# Patient Record
Sex: FEMALE | Race: OTHER | HISPANIC OR LATINO | URBAN - METROPOLITAN AREA
[De-identification: names, ages, dates, MRNs, and addresses within clinical notes are randomized per-mention and may not be internally consistent; named-entity substitution may affect disease eponyms.]

---

## 2017-06-12 ENCOUNTER — OUTPATIENT (OUTPATIENT)
Dept: OUTPATIENT SERVICES | Facility: HOSPITAL | Age: 52
LOS: 1 days | Discharge: HOME | End: 2017-06-12

## 2017-06-12 DIAGNOSIS — F19.20 OTHER PSYCHOACTIVE SUBSTANCE DEPENDENCE, UNCOMPLICATED: ICD-10-CM

## 2017-06-12 DIAGNOSIS — F33.2 MAJOR DEPRESSIVE DISORDER, RECURRENT SEVERE WITHOUT PSYCHOTIC FEATURES: ICD-10-CM

## 2017-06-28 DIAGNOSIS — F11.20 OPIOID DEPENDENCE, UNCOMPLICATED: ICD-10-CM

## 2017-07-12 ENCOUNTER — EMERGENCY (EMERGENCY)
Facility: HOSPITAL | Age: 52
LOS: 0 days | Discharge: HOME | End: 2017-07-12

## 2017-07-12 DIAGNOSIS — F32.9 MAJOR DEPRESSIVE DISORDER, SINGLE EPISODE, UNSPECIFIED: ICD-10-CM

## 2017-07-12 DIAGNOSIS — S83.91XA SPRAIN OF UNSPECIFIED SITE OF RIGHT KNEE, INITIAL ENCOUNTER: ICD-10-CM
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
## 2017-07-12 DIAGNOSIS — Z79.899 OTHER LONG TERM (CURRENT) DRUG THERAPY: ICD-10-CM

## 2017-07-12 DIAGNOSIS — S89.91XA UNSPECIFIED INJURY OF RIGHT LOWER LEG, INITIAL ENCOUNTER: ICD-10-CM

## 2017-07-12 DIAGNOSIS — Z87.891 PERSONAL HISTORY OF NICOTINE DEPENDENCE: ICD-10-CM

## 2017-07-12 DIAGNOSIS — I10 ESSENTIAL (PRIMARY) HYPERTENSION: ICD-10-CM

## 2017-07-12 DIAGNOSIS — F19.20 OTHER PSYCHOACTIVE SUBSTANCE DEPENDENCE, UNCOMPLICATED: ICD-10-CM

## 2017-07-12 DIAGNOSIS — F33.2 MAJOR DEPRESSIVE DISORDER, RECURRENT SEVERE WITHOUT PSYCHOTIC FEATURES: ICD-10-CM

## 2017-11-06 ENCOUNTER — EMERGENCY (EMERGENCY)
Facility: HOSPITAL | Age: 52
LOS: 0 days | Discharge: HOME | End: 2017-11-06

## 2017-11-06 DIAGNOSIS — Z79.899 OTHER LONG TERM (CURRENT) DRUG THERAPY: ICD-10-CM

## 2017-11-06 DIAGNOSIS — I10 ESSENTIAL (PRIMARY) HYPERTENSION: ICD-10-CM

## 2017-11-06 DIAGNOSIS — F19.90 OTHER PSYCHOACTIVE SUBSTANCE USE, UNSPECIFIED, UNCOMPLICATED: ICD-10-CM

## 2017-11-06 DIAGNOSIS — F19.20 OTHER PSYCHOACTIVE SUBSTANCE DEPENDENCE, UNCOMPLICATED: ICD-10-CM

## 2017-11-06 DIAGNOSIS — F33.2 MAJOR DEPRESSIVE DISORDER, RECURRENT SEVERE WITHOUT PSYCHOTIC FEATURES: ICD-10-CM

## 2017-11-06 DIAGNOSIS — F41.9 ANXIETY DISORDER, UNSPECIFIED: ICD-10-CM

## 2017-11-06 DIAGNOSIS — F41.0 PANIC DISORDER [EPISODIC PAROXYSMAL ANXIETY]: ICD-10-CM

## 2017-11-25 ENCOUNTER — INPATIENT (INPATIENT)
Facility: HOSPITAL | Age: 52
LOS: 10 days | Discharge: HOME | End: 2017-12-06
Attending: PSYCHIATRY & NEUROLOGY

## 2017-11-25 DIAGNOSIS — F19.20 OTHER PSYCHOACTIVE SUBSTANCE DEPENDENCE, UNCOMPLICATED: ICD-10-CM

## 2017-11-25 DIAGNOSIS — F33.2 MAJOR DEPRESSIVE DISORDER, RECURRENT SEVERE WITHOUT PSYCHOTIC FEATURES: ICD-10-CM

## 2017-12-11 DIAGNOSIS — Z59.0 HOMELESSNESS: ICD-10-CM

## 2017-12-11 DIAGNOSIS — R45.851 SUICIDAL IDEATIONS: ICD-10-CM

## 2017-12-11 DIAGNOSIS — F33.1 MAJOR DEPRESSIVE DISORDER, RECURRENT, MODERATE: ICD-10-CM

## 2017-12-11 DIAGNOSIS — F11.20 OPIOID DEPENDENCE, UNCOMPLICATED: ICD-10-CM

## 2017-12-11 DIAGNOSIS — I10 ESSENTIAL (PRIMARY) HYPERTENSION: ICD-10-CM

## 2017-12-11 DIAGNOSIS — F41.9 ANXIETY DISORDER, UNSPECIFIED: ICD-10-CM

## 2017-12-11 DIAGNOSIS — F14.10 COCAINE ABUSE, UNCOMPLICATED: ICD-10-CM

## 2017-12-11 SDOH — ECONOMIC STABILITY - HOUSING INSECURITY: HOMELESSNESS: Z59.0

## 2018-01-24 ENCOUNTER — EMERGENCY (EMERGENCY)
Facility: HOSPITAL | Age: 53
LOS: 0 days | Discharge: HOME | End: 2018-01-24

## 2018-01-24 DIAGNOSIS — F33.2 MAJOR DEPRESSIVE DISORDER, RECURRENT SEVERE WITHOUT PSYCHOTIC FEATURES: ICD-10-CM

## 2018-01-24 DIAGNOSIS — I10 ESSENTIAL (PRIMARY) HYPERTENSION: ICD-10-CM

## 2018-01-24 DIAGNOSIS — F19.20 OTHER PSYCHOACTIVE SUBSTANCE DEPENDENCE, UNCOMPLICATED: ICD-10-CM

## 2018-01-24 DIAGNOSIS — Z76.0 ENCOUNTER FOR ISSUE OF REPEAT PRESCRIPTION: ICD-10-CM

## 2018-01-24 DIAGNOSIS — Z79.891 LONG TERM (CURRENT) USE OF OPIATE ANALGESIC: ICD-10-CM

## 2018-01-24 DIAGNOSIS — Z79.899 OTHER LONG TERM (CURRENT) DRUG THERAPY: ICD-10-CM

## 2018-02-01 PROBLEM — Z00.00 ENCOUNTER FOR PREVENTIVE HEALTH EXAMINATION: Status: ACTIVE | Noted: 2018-02-01

## 2018-03-05 ENCOUNTER — APPOINTMENT (OUTPATIENT)
Dept: INTERNAL MEDICINE | Facility: CLINIC | Age: 53
End: 2018-03-05

## 2018-04-04 ENCOUNTER — OUTPATIENT (OUTPATIENT)
Dept: OUTPATIENT SERVICES | Facility: HOSPITAL | Age: 53
LOS: 1 days | Discharge: HOME | End: 2018-04-04

## 2018-04-05 DIAGNOSIS — F11.20 OPIOID DEPENDENCE, UNCOMPLICATED: ICD-10-CM

## 2018-07-23 ENCOUNTER — OUTPATIENT (OUTPATIENT)
Dept: OUTPATIENT SERVICES | Facility: HOSPITAL | Age: 53
LOS: 1 days | Discharge: HOME | End: 2018-07-23

## 2018-07-23 DIAGNOSIS — F11.20 OPIOID DEPENDENCE, UNCOMPLICATED: ICD-10-CM

## 2018-08-20 ENCOUNTER — OUTPATIENT (OUTPATIENT)
Dept: OUTPATIENT SERVICES | Facility: HOSPITAL | Age: 53
LOS: 1 days | Discharge: HOME | End: 2018-08-20

## 2018-08-21 DIAGNOSIS — F11.20 OPIOID DEPENDENCE, UNCOMPLICATED: ICD-10-CM

## 2018-08-29 ENCOUNTER — OUTPATIENT (OUTPATIENT)
Dept: OUTPATIENT SERVICES | Facility: HOSPITAL | Age: 53
LOS: 1 days | Discharge: HOME | End: 2018-08-29

## 2018-08-29 DIAGNOSIS — F11.20 OPIOID DEPENDENCE, UNCOMPLICATED: ICD-10-CM

## 2018-09-26 ENCOUNTER — OUTPATIENT (OUTPATIENT)
Dept: OUTPATIENT SERVICES | Facility: HOSPITAL | Age: 53
LOS: 1 days | Discharge: HOME | End: 2018-09-26

## 2018-09-26 DIAGNOSIS — F11.20 OPIOID DEPENDENCE, UNCOMPLICATED: ICD-10-CM

## 2018-10-19 ENCOUNTER — EMERGENCY (EMERGENCY)
Facility: HOSPITAL | Age: 53
LOS: 0 days | Discharge: HOME | End: 2018-10-20
Attending: EMERGENCY MEDICINE

## 2018-10-19 VITALS
RESPIRATION RATE: 18 BRPM | HEART RATE: 94 BPM | DIASTOLIC BLOOD PRESSURE: 90 MMHG | WEIGHT: 190.04 LBS | OXYGEN SATURATION: 96 % | TEMPERATURE: 98 F | SYSTOLIC BLOOD PRESSURE: 184 MMHG

## 2018-10-19 DIAGNOSIS — R07.9 CHEST PAIN, UNSPECIFIED: ICD-10-CM

## 2018-10-19 DIAGNOSIS — Z90.49 ACQUIRED ABSENCE OF OTHER SPECIFIED PARTS OF DIGESTIVE TRACT: Chronic | ICD-10-CM

## 2018-10-19 DIAGNOSIS — Z98.891 HISTORY OF UTERINE SCAR FROM PREVIOUS SURGERY: Chronic | ICD-10-CM

## 2018-10-19 DIAGNOSIS — I10 ESSENTIAL (PRIMARY) HYPERTENSION: ICD-10-CM

## 2018-10-19 DIAGNOSIS — F17.210 NICOTINE DEPENDENCE, CIGARETTES, UNCOMPLICATED: ICD-10-CM

## 2018-10-19 LAB
ALBUMIN SERPL ELPH-MCNC: 4.4 G/DL — SIGNIFICANT CHANGE UP (ref 3.5–5.2)
ALP SERPL-CCNC: 87 U/L — SIGNIFICANT CHANGE UP (ref 30–115)
ALT FLD-CCNC: 338 U/L — HIGH (ref 0–41)
ANION GAP SERPL CALC-SCNC: 15 MMOL/L — HIGH (ref 7–14)
APTT BLD: 37.3 SEC — SIGNIFICANT CHANGE UP (ref 27–39.2)
AST SERPL-CCNC: 229 U/L — HIGH (ref 0–41)
BASE EXCESS BLDV CALC-SCNC: 6.2 MMOL/L — HIGH (ref -2–2)
BILIRUB SERPL-MCNC: 0.3 MG/DL — SIGNIFICANT CHANGE UP (ref 0.2–1.2)
BUN SERPL-MCNC: 17 MG/DL — SIGNIFICANT CHANGE UP (ref 10–20)
CA-I SERPL-SCNC: 1.22 MMOL/L — SIGNIFICANT CHANGE UP (ref 1.12–1.3)
CALCIUM SERPL-MCNC: 9.4 MG/DL — SIGNIFICANT CHANGE UP (ref 8.5–10.1)
CHLORIDE SERPL-SCNC: 97 MMOL/L — LOW (ref 98–110)
CO2 SERPL-SCNC: 25 MMOL/L — SIGNIFICANT CHANGE UP (ref 17–32)
CREAT SERPL-MCNC: 0.6 MG/DL — LOW (ref 0.7–1.5)
GAS PNL BLDV: 144 MMOL/L — SIGNIFICANT CHANGE UP (ref 136–145)
GAS PNL BLDV: SIGNIFICANT CHANGE UP
GAS PNL BLDV: SIGNIFICANT CHANGE UP
GLUCOSE SERPL-MCNC: 109 MG/DL — HIGH (ref 70–99)
HCO3 BLDV-SCNC: 34 MMOL/L — HIGH (ref 22–29)
HCT VFR BLD CALC: 45 % — SIGNIFICANT CHANGE UP (ref 37–47)
HCT VFR BLDA CALC: 48.7 % — HIGH (ref 34–44)
HGB BLD CALC-MCNC: 15.9 G/DL — SIGNIFICANT CHANGE UP (ref 14–18)
HGB BLD-MCNC: 15 G/DL — SIGNIFICANT CHANGE UP (ref 12–16)
INR BLD: 1.25 RATIO — SIGNIFICANT CHANGE UP (ref 0.65–1.3)
LACTATE BLDV-MCNC: 0.7 MMOL/L — SIGNIFICANT CHANGE UP (ref 0.5–1.6)
LIDOCAIN IGE QN: 38 U/L — SIGNIFICANT CHANGE UP (ref 7–60)
MAGNESIUM SERPL-MCNC: 1.8 MG/DL — SIGNIFICANT CHANGE UP (ref 1.8–2.4)
MCHC RBC-ENTMCNC: 30.5 PG — SIGNIFICANT CHANGE UP (ref 27–31)
MCHC RBC-ENTMCNC: 33.3 G/DL — SIGNIFICANT CHANGE UP (ref 32–37)
MCV RBC AUTO: 91.6 FL — SIGNIFICANT CHANGE UP (ref 81–99)
NRBC # BLD: 0 /100 WBCS — SIGNIFICANT CHANGE UP (ref 0–0)
NT-PROBNP SERPL-SCNC: 73 PG/ML — SIGNIFICANT CHANGE UP (ref 0–300)
PCO2 BLDV: 63 MMHG — HIGH (ref 41–51)
PH BLDV: 7.34 — SIGNIFICANT CHANGE UP (ref 7.26–7.43)
PLATELET # BLD AUTO: 135 K/UL — SIGNIFICANT CHANGE UP (ref 130–400)
PO2 BLDV: 26 MMHG — SIGNIFICANT CHANGE UP (ref 20–40)
POTASSIUM BLDV-SCNC: 4 MMOL/L — SIGNIFICANT CHANGE UP (ref 3.3–5.6)
POTASSIUM SERPL-MCNC: 7 MMOL/L — CRITICAL HIGH (ref 3.5–5)
POTASSIUM SERPL-SCNC: 7 MMOL/L — CRITICAL HIGH (ref 3.5–5)
PROT SERPL-MCNC: 9.4 G/DL — HIGH (ref 6–8)
PROTHROM AB SERPL-ACNC: 14.4 SEC — HIGH (ref 9.95–12.87)
RBC # BLD: 4.91 M/UL — SIGNIFICANT CHANGE UP (ref 4.2–5.4)
RBC # FLD: 14.3 % — SIGNIFICANT CHANGE UP (ref 11.5–14.5)
SAO2 % BLDV: 35 % — SIGNIFICANT CHANGE UP
SODIUM SERPL-SCNC: 137 MMOL/L — SIGNIFICANT CHANGE UP (ref 135–146)
TROPONIN T SERPL-MCNC: <0.01 NG/ML — SIGNIFICANT CHANGE UP
TROPONIN T SERPL-MCNC: <0.01 NG/ML — SIGNIFICANT CHANGE UP
WBC # BLD: 7.46 K/UL — SIGNIFICANT CHANGE UP (ref 4.8–10.8)
WBC # FLD AUTO: 7.46 K/UL — SIGNIFICANT CHANGE UP (ref 4.8–10.8)

## 2018-10-19 RX ORDER — METOCLOPRAMIDE HCL 10 MG
10 TABLET ORAL ONCE
Qty: 0 | Refills: 0 | Status: COMPLETED | OUTPATIENT
Start: 2018-10-19 | End: 2018-10-19

## 2018-10-19 RX ORDER — ACETAMINOPHEN 500 MG
975 TABLET ORAL ONCE
Qty: 0 | Refills: 0 | Status: COMPLETED | OUTPATIENT
Start: 2018-10-19 | End: 2018-10-19

## 2018-10-19 RX ORDER — HYDROCHLOROTHIAZIDE 25 MG
12.5 TABLET ORAL ONCE
Qty: 0 | Refills: 0 | Status: COMPLETED | OUTPATIENT
Start: 2018-10-19 | End: 2018-10-19

## 2018-10-19 RX ORDER — AMLODIPINE BESYLATE 2.5 MG/1
2.5 TABLET ORAL ONCE
Qty: 0 | Refills: 0 | Status: COMPLETED | OUTPATIENT
Start: 2018-10-19 | End: 2018-10-19

## 2018-10-19 RX ORDER — METOPROLOL TARTRATE 50 MG
100 TABLET ORAL ONCE
Qty: 0 | Refills: 0 | Status: COMPLETED | OUTPATIENT
Start: 2018-10-19 | End: 2018-10-19

## 2018-10-19 RX ADMIN — AMLODIPINE BESYLATE 2.5 MILLIGRAM(S): 2.5 TABLET ORAL at 17:22

## 2018-10-19 RX ADMIN — Medication 10 MILLIGRAM(S): at 17:17

## 2018-10-19 RX ADMIN — Medication 100 MILLIGRAM(S): at 17:22

## 2018-10-19 RX ADMIN — Medication 12.5 MILLIGRAM(S): at 17:22

## 2018-10-19 NOTE — ED CDU PROVIDER INITIAL DAY NOTE - NS ED ROS FT
Constitutional: No fever, chills.  Eyes: No visual changes.  ENT: No hearing changes.  Neck: No neck pain or stiffness.  Cardiovascular: see hpi  Pulmonary: No SOB, cough. No hemoptysis.  Abdominal:  No nausea, vomiting, diarrhea.  : No dysuria, frequency.  Neuro: No headache, syncope, dizziness.  MS: No back pain. No calf pain/swelling.  Psych: No suicidal ideations.

## 2018-10-19 NOTE — ED PROVIDER NOTE - PROGRESS NOTE DETAILS
pt feels improved with tylenol and reglan.  no longer has headache.  no CP at this time Discussed results with cecy Ramirez with observation Discussed case with ABUNDIO Mtz,  aware of observation

## 2018-10-19 NOTE — ED ADULT NURSE NOTE - NSIMPLEMENTINTERV_GEN_ALL_ED
Implemented All Universal Safety Interventions:  [place] to call system. Call bell, personal items and telephone within reach. Instruct patient to call for assistance. Room bathroom lighting operational. Non-slip footwear when patient is off stretcher. Physically safe environment: no spills, clutter or unnecessary equipment. Stretcher in lowest position, wheels locked, appropriate side rails in place.

## 2018-10-19 NOTE — ED PROVIDER NOTE - ATTENDING CONTRIBUTION TO CARE
53 y F PMH depression, anxiety, HTN on 3 medications, noncompliant due to running out and DrSachin is on vacation unable to give refills x 1.5 wks, pw headache x 1.5 weeks. Today occurred again, gradual onset, no vision changes, numbness, tingling, weakness. Chest pressure and diaphoresis occurring with walking. NO radiation of pain elsewhere. Patient has never had any cardiac workup in the past. Was seen at [facility] yesterday with negative CT head, blood tests showing some changes to the ?liver tests, but she is unsure.  Exam: NAD, NCAT, HEENT: mmm, EOMI, PERRLA, Neck: supple, nontender, nl ROM, Heart: RRR, no murmur, Lungs: BCTA, no signs of increased WOB, Abd: NTND, no guarding or rebound, no hernia palpated, no CVAT. MSK: chest, back, and ext nontender, nl rom, no deformity. Neuro: A&Ox3, CN II-XII intact, normal strength 5/5 all 4 ext, nl sensation throughout, normal speech and coordination.  A/P: Eval ACS, HTN emergency, intracranial bleeding, or other complications of elevated BP. most likely tension headache and medication noncompliance as cause for elevated BP, however chest pressure and diaphoresis is concerning.

## 2018-10-19 NOTE — ED CDU PROVIDER INITIAL DAY NOTE - ATTENDING CONTRIBUTION TO CARE
Pt presents with chest pain that woke her up yesterday. Pain radiated to the left chest. Hx of tob and HTN. Pain lasted for 3 hours. On exam S1S2 rrr, lungs clear, abdomen is soft non tenderness, ext mild edema bilaterally. EKG non ischemic. Chest x-ray normal. Hx of elevated lfts. Pt is scheduled for a CCTA. IV 18 HR is 90 Will give lopressor.

## 2018-10-19 NOTE — ED CDU PROVIDER INITIAL DAY NOTE - OBJECTIVE STATEMENT
53 yold female to Ed Pmhx Bipolar d/o, depression, anxiety, Htn present to Ed with multiple complaints; pt c/o headache, photophobia and elevated bp; pt has not taken bp meds in a few weeks; pt also c/o cp described as tightness; seen at [facility] overnight; pt denies stress test;

## 2018-10-19 NOTE — ED CDU PROVIDER INITIAL DAY NOTE - PHYSICAL EXAMINATION
Constitutional: Well developed, well nourished. NAD  Head: Normocephalic, atraumatic.  Eyes: PERRL, EOMI.  ENT: No nasal discharge. Mucous membranes dry.  Neck: Supple. Painless ROM.  Cardiovascular:  Regular rate and rhythm.   Pulmonary:  Lungs clear to auscultation bilaterally.   Abdominal: Soft. Nondistended. No rebound, guarding, rigidity.  Extremities. Pelvis stable. No lower extremity edema, symmetric calves.  Skin: No rashes, cyanosis.  Neuro: AAOx3. No focal neurological deficits.  Psych: Normal mood. Normal affect.

## 2018-10-19 NOTE — ED PROVIDER NOTE - PHYSICAL EXAMINATION
CONST: Well appearing in NAD  EYES: PERRL, EOMI, Sclera and conjunctiva clear.  ENT: No nasal discharge. TM's clear B/L without drainage. Oropharynx normal appearing, no erythema or exudates. Uvula midline.  NECK: Non-tender, no meningeal signs, supple  CARD: Normal S1 S2; Normal rate and rhythm  RESP: Equal BS B/L, No wheezes, rhonchi or rales. No distress  GI: Soft, non-tender, non-distended.  MS: Normal ROM in all extremities. no edema of lower extremities, no calf pain, No midline spinal tenderness. pedal pulses 2+ bilaterally  SKIN: Warm, dry, no acute rashes. Good turgor  NEURO: A&Ox3, CN II-XII intact, no focal deficits, no facial asymmetry, no pronator drift, normal finger to nose, no nystagmus, gross sensation intact, UE/LE strength 5/5, stable gait

## 2018-10-19 NOTE — ED ADULT NURSE NOTE - OBJECTIVE STATEMENT
pt presents with midsternal chest pressure with hypertension and headache that started 1 week ago and worsened today.

## 2018-10-19 NOTE — ED CDU PROVIDER INITIAL DAY NOTE - PROGRESS NOTE DETAILS
received signout from Isaías Triplett - pt sent to Obs for cardiac eval; head ct negative; 1st ce/ekg wnl; will continue to follow;

## 2018-10-19 NOTE — ED PROVIDER NOTE - OBJECTIVE STATEMENT
53 y.o female with hx of anxiety, dipolar, depression HTN presents to the ED for evaluation of chest pain and HA x 1 day.  Pt states that she woke up this morning and developed gradual onset of headache with associated photophobia.  Headache was not maximal in onset.  HA persisted throughout day which is similar to headaches shes has had in the past.  Around 1000 hrs developed chest tightness which prompted her to go to PMD.  Had BP in 200s systolic and was told to come to the ED.  Pt relates she has HTN and is supposed to be taking HCTZ, amlodipine, metoprolol but has not taken them for "some time" secondary to insurance issues. States that she went to [facility] with similar complaints yesterday, had negative workup, was observed overnight and discharged. Adds no other complaints at this time and denies head injury, nausea, vomiting, dyspnea, edema of lower extremities, calf pain.  No fhx of cardiac dz.  had negative stress test 2 years ago.

## 2018-10-19 NOTE — ED PROVIDER NOTE - MEDICAL DECISION MAKING DETAILS
Patient to be placed into observation status. There is a lack of diagnostic certainty, where a more precise diagnosis could decide inpatient admission or discharge to home, and/or need for therapeutic intensity, where extensive therapy has a reasonable possibility of abating the patient's presenting condition, and thereby prevents inpatient admission.

## 2018-10-19 NOTE — ED PROVIDER NOTE - NS ED ROS FT
Constitutional: See HPI.  Eyes: No visual changes, eye pain or discharge. No Photophobia  ENMT: No hearing changes, pain, discharge or infections. No neck pain or stiffness. No limited ROM  Cardiac:  + chest pain. No SOB or edema.   Respiratory: No cough or respiratory distress. No hemoptysis. No history of asthma or RAD.  GI: No nausea, vomiting, diarrhea or abdominal pain.  : No dysuria, frequency or burning. No Discharge  MS: No myalgia, muscle weakness, joint pain or back pain.  Neuro: + headache. No  weakness. No LOC.  Skin: No skin rash.  Except as documented in the HPI, all other systems are negative.

## 2018-10-20 RX ORDER — GABAPENTIN 400 MG/1
100 CAPSULE ORAL ONCE
Qty: 0 | Refills: 0 | Status: COMPLETED | OUTPATIENT
Start: 2018-10-20 | End: 2018-10-20

## 2018-10-20 RX ORDER — CITALOPRAM 10 MG/1
10 TABLET, FILM COATED ORAL DAILY
Qty: 0 | Refills: 0 | Status: DISCONTINUED | OUTPATIENT
Start: 2018-10-20 | End: 2018-10-20

## 2018-10-20 RX ORDER — ALPRAZOLAM 0.25 MG
0.5 TABLET ORAL ONCE
Qty: 0 | Refills: 0 | Status: DISCONTINUED | OUTPATIENT
Start: 2018-10-20 | End: 2018-10-20

## 2018-10-20 RX ORDER — AMLODIPINE BESYLATE 2.5 MG/1
5 TABLET ORAL ONCE
Qty: 0 | Refills: 0 | Status: COMPLETED | OUTPATIENT
Start: 2018-10-20 | End: 2018-10-20

## 2018-10-20 RX ORDER — METOPROLOL TARTRATE 50 MG
150 TABLET ORAL ONCE
Qty: 0 | Refills: 0 | Status: COMPLETED | OUTPATIENT
Start: 2018-10-20 | End: 2018-10-20

## 2018-10-20 RX ORDER — METOPROLOL TARTRATE 50 MG
50 TABLET ORAL ONCE
Qty: 0 | Refills: 0 | Status: COMPLETED | OUTPATIENT
Start: 2018-10-20 | End: 2018-10-20

## 2018-10-20 RX ORDER — PANTOPRAZOLE SODIUM 20 MG/1
40 TABLET, DELAYED RELEASE ORAL
Qty: 0 | Refills: 0 | Status: DISCONTINUED | OUTPATIENT
Start: 2018-10-20 | End: 2018-10-20

## 2018-10-20 RX ORDER — ADENOSINE 3 MG/ML
60 INJECTION INTRAVENOUS ONCE
Qty: 0 | Refills: 0 | Status: DISCONTINUED | OUTPATIENT
Start: 2018-10-20 | End: 2018-10-20

## 2018-10-20 RX ORDER — HYDROCHLOROTHIAZIDE 25 MG
12.5 TABLET ORAL ONCE
Qty: 0 | Refills: 0 | Status: COMPLETED | OUTPATIENT
Start: 2018-10-20 | End: 2018-10-20

## 2018-10-20 RX ADMIN — PANTOPRAZOLE SODIUM 40 MILLIGRAM(S): 20 TABLET, DELAYED RELEASE ORAL at 14:50

## 2018-10-20 RX ADMIN — Medication 12.5 MILLIGRAM(S): at 14:50

## 2018-10-20 RX ADMIN — Medication 150 MILLIGRAM(S): at 08:23

## 2018-10-20 RX ADMIN — CITALOPRAM 10 MILLIGRAM(S): 10 TABLET, FILM COATED ORAL at 14:50

## 2018-10-20 RX ADMIN — Medication 50 MILLIGRAM(S): at 11:44

## 2018-10-20 RX ADMIN — Medication 50 MILLIGRAM(S): at 09:39

## 2018-10-20 RX ADMIN — Medication 0.5 MILLIGRAM(S): at 12:23

## 2018-10-20 RX ADMIN — GABAPENTIN 100 MILLIGRAM(S): 400 CAPSULE ORAL at 14:51

## 2018-10-20 RX ADMIN — AMLODIPINE BESYLATE 5 MILLIGRAM(S): 2.5 TABLET ORAL at 14:50

## 2018-10-20 NOTE — ED CDU PROVIDER SUBSEQUENT DAY NOTE - PROGRESS NOTE DETAILS
Patient comfortable, awaiting CCTA Patients HR still above 70 after Lopressor. CCTA cancel and nuclear stress test ordered Ultrasound guided IV placed for Nuclear medicine. Nuclear medicine(alyssa) advised that patient is ready for stress testing. Pt ate lunch and Stress test advised she will not be able to do stress testing today. Will do in the am. Pt advised to stay NPO. received sign out from Isaías Reyes/Dr. Dacosta; pt with ce x 2 / ekg x 2 unchanged; Nuclear stress test to be in am; pt resting in obs without complaints; will have stress test this am; npo now; patient signed out from lian aguilar, no complaint sleeping

## 2018-10-21 VITALS
HEART RATE: 77 BPM | TEMPERATURE: 98 F | SYSTOLIC BLOOD PRESSURE: 155 MMHG | RESPIRATION RATE: 18 BRPM | DIASTOLIC BLOOD PRESSURE: 86 MMHG | OXYGEN SATURATION: 98 %

## 2018-10-21 RX ORDER — AMLODIPINE BESYLATE 2.5 MG/1
5 TABLET ORAL ONCE
Qty: 0 | Refills: 0 | Status: COMPLETED | OUTPATIENT
Start: 2018-10-21 | End: 2018-10-21

## 2018-10-21 RX ORDER — CITALOPRAM 10 MG/1
10 TABLET, FILM COATED ORAL DAILY
Qty: 0 | Refills: 0 | Status: DISCONTINUED | OUTPATIENT
Start: 2018-10-21 | End: 2018-10-20

## 2018-10-21 RX ORDER — METOPROLOL TARTRATE 50 MG
100 TABLET ORAL ONCE
Qty: 0 | Refills: 0 | Status: COMPLETED | OUTPATIENT
Start: 2018-10-21 | End: 2018-10-21

## 2018-10-21 RX ORDER — HYDROCHLOROTHIAZIDE 25 MG
12.5 TABLET ORAL ONCE
Qty: 0 | Refills: 0 | Status: COMPLETED | OUTPATIENT
Start: 2018-10-21 | End: 2018-10-21

## 2018-10-21 RX ORDER — GABAPENTIN 400 MG/1
100 CAPSULE ORAL ONCE
Qty: 0 | Refills: 0 | Status: COMPLETED | OUTPATIENT
Start: 2018-10-21 | End: 2018-10-21

## 2018-10-21 RX ADMIN — PANTOPRAZOLE SODIUM 40 MILLIGRAM(S): 20 TABLET, DELAYED RELEASE ORAL at 11:03

## 2018-10-21 RX ADMIN — GABAPENTIN 100 MILLIGRAM(S): 400 CAPSULE ORAL at 11:03

## 2018-10-21 RX ADMIN — Medication 100 MILLIGRAM(S): at 11:03

## 2018-10-21 RX ADMIN — CITALOPRAM 10 MILLIGRAM(S): 10 TABLET, FILM COATED ORAL at 11:03

## 2018-10-21 RX ADMIN — Medication 12.5 MILLIGRAM(S): at 11:03

## 2018-10-21 RX ADMIN — AMLODIPINE BESYLATE 5 MILLIGRAM(S): 2.5 TABLET ORAL at 11:03

## 2018-10-21 NOTE — ED CDU PROVIDER SUBSEQUENT DAY NOTE - MEDICAL DECISION MAKING DETAILS
chest pain>>CE neg>>Stress test. Pt is npo
Unable to Do CCTA due to elevated heart rate. Stress test ordered.

## 2018-10-21 NOTE — ED CDU PROVIDER DISPOSITION NOTE - CLINICAL COURSE
Pt placed into observation for chest pain. While in observation pt was on continuous cardiac monitoring. Serial cardiac markers neg. Stress test neg. All reports given to pt. Hx of hepatitis. Will refer to GI and cardiology. All reports given to pt.

## 2018-10-21 NOTE — ED ADULT NURSE REASSESSMENT NOTE - NS ED NURSE REASSESS COMMENT FT1
Patient admitted to ED3 from main ER. Patient A&Ox4, no respiratory distress, on cardiac monitor for dx CP, no GI distress, voids without difficulty. No skin breakdown. Will continue to monitor for comfort and safety.
Pt assessed A.O times 4 Vs stable on cardiac monitor  denies no chest pain no SOB no N/V no doziness ambulate steady ,pt is seen evaluate by ED attending  with order for CTA Metoprolol 50 mg po order is given ,NPO at this time on going nursing observation .
Pt assessed. AAO x 4. Pt resting comfortably in stretcher. No distress noted at this time. Vitals stable. Comfort measures in place. Safety measures maintained. Pt on continuous cardiac and pulse ox monitoring.
Pt reassessed A.O times 4 Vs stable denies no chest pain ,no SOB ,no N.V ambulate steady dinner tray provide did eat 100% ,pt all medication is given as per order ED attending aware NAD noted on going nursing  observation .
Patient A&Ox3  Patient remains on cardiac monitor and in stable condition and nad. Will continue to monitor.
patient A&Ox3  Patient sleeping in bed with no difficulty breathing noted.  Patient remains on cardiac monitor. Patient in stable condition and nad.  Will continue to monitor.
patient VS taken and reported high BP to provider.
patient sleeping in bed with no difficulty breathing noted. Patient continues to be monitored on cardiac monitor.  Patient in stable condition and nad.  Will continue to monitor.

## 2018-10-21 NOTE — ED CDU PROVIDER SUBSEQUENT DAY NOTE - ATTENDING CONTRIBUTION TO CARE
Pt is in observation for chest pain. She will be completing her stress test this am. Pt has no complaints.
Pt is in observation for chest pain.

## 2018-10-31 ENCOUNTER — OUTPATIENT (OUTPATIENT)
Dept: OUTPATIENT SERVICES | Facility: HOSPITAL | Age: 53
LOS: 1 days | Discharge: HOME | End: 2018-10-31

## 2018-10-31 DIAGNOSIS — Z98.891 HISTORY OF UTERINE SCAR FROM PREVIOUS SURGERY: Chronic | ICD-10-CM

## 2018-10-31 DIAGNOSIS — Z90.49 ACQUIRED ABSENCE OF OTHER SPECIFIED PARTS OF DIGESTIVE TRACT: Chronic | ICD-10-CM

## 2018-11-01 DIAGNOSIS — F11.20 OPIOID DEPENDENCE, UNCOMPLICATED: ICD-10-CM

## 2018-11-01 PROBLEM — I10 ESSENTIAL (PRIMARY) HYPERTENSION: Chronic | Status: ACTIVE | Noted: 2018-10-19

## 2018-11-01 PROBLEM — F32.9 MAJOR DEPRESSIVE DISORDER, SINGLE EPISODE, UNSPECIFIED: Chronic | Status: ACTIVE | Noted: 2018-10-19

## 2018-11-01 PROBLEM — F41.0 PANIC DISORDER [EPISODIC PAROXYSMAL ANXIETY]: Chronic | Status: ACTIVE | Noted: 2018-10-19

## 2018-11-01 PROBLEM — F41.9 ANXIETY DISORDER, UNSPECIFIED: Chronic | Status: ACTIVE | Noted: 2018-10-19

## 2018-12-07 ENCOUNTER — INPATIENT (INPATIENT)
Facility: HOSPITAL | Age: 53
LOS: 4 days | Discharge: HOME | End: 2018-12-12
Attending: PSYCHIATRY & NEUROLOGY | Admitting: PSYCHIATRY & NEUROLOGY

## 2018-12-07 VITALS
OXYGEN SATURATION: 95 % | TEMPERATURE: 98 F | HEART RATE: 86 BPM | SYSTOLIC BLOOD PRESSURE: 168 MMHG | DIASTOLIC BLOOD PRESSURE: 77 MMHG | RESPIRATION RATE: 18 BRPM

## 2018-12-07 DIAGNOSIS — F33.9 MAJOR DEPRESSIVE DISORDER, RECURRENT, UNSPECIFIED: ICD-10-CM

## 2018-12-07 DIAGNOSIS — R69 ILLNESS, UNSPECIFIED: ICD-10-CM

## 2018-12-07 DIAGNOSIS — Z98.891 HISTORY OF UTERINE SCAR FROM PREVIOUS SURGERY: Chronic | ICD-10-CM

## 2018-12-07 DIAGNOSIS — Z90.49 ACQUIRED ABSENCE OF OTHER SPECIFIED PARTS OF DIGESTIVE TRACT: Chronic | ICD-10-CM

## 2018-12-07 LAB
ALBUMIN SERPL ELPH-MCNC: 4.1 G/DL — SIGNIFICANT CHANGE UP (ref 3.5–5.2)
ALP SERPL-CCNC: 109 U/L — SIGNIFICANT CHANGE UP (ref 30–115)
ALT FLD-CCNC: 257 U/L — HIGH (ref 0–41)
ANION GAP SERPL CALC-SCNC: 12 MMOL/L — SIGNIFICANT CHANGE UP (ref 7–14)
APPEARANCE UR: CLEAR — SIGNIFICANT CHANGE UP
AST SERPL-CCNC: 168 U/L — HIGH (ref 0–41)
BASOPHILS # BLD AUTO: 0.08 K/UL — SIGNIFICANT CHANGE UP (ref 0–0.2)
BASOPHILS NFR BLD AUTO: 0.9 % — SIGNIFICANT CHANGE UP (ref 0–1)
BILIRUB DIRECT SERPL-MCNC: <0.2 MG/DL — SIGNIFICANT CHANGE UP (ref 0–0.2)
BILIRUB INDIRECT FLD-MCNC: >0.2 MG/DL — SIGNIFICANT CHANGE UP (ref 0.2–1.2)
BILIRUB SERPL-MCNC: 0.4 MG/DL — SIGNIFICANT CHANGE UP (ref 0.2–1.2)
BILIRUB UR-MCNC: NEGATIVE — SIGNIFICANT CHANGE UP
BUN SERPL-MCNC: 17 MG/DL — SIGNIFICANT CHANGE UP (ref 10–20)
CALCIUM SERPL-MCNC: 9.2 MG/DL — SIGNIFICANT CHANGE UP (ref 8.5–10.1)
CHLORIDE SERPL-SCNC: 95 MMOL/L — LOW (ref 98–110)
CO2 SERPL-SCNC: 29 MMOL/L — SIGNIFICANT CHANGE UP (ref 17–32)
COLOR SPEC: YELLOW — SIGNIFICANT CHANGE UP
CREAT SERPL-MCNC: 0.5 MG/DL — LOW (ref 0.7–1.5)
DIFF PNL FLD: NEGATIVE — SIGNIFICANT CHANGE UP
EOSINOPHIL # BLD AUTO: 0.13 K/UL — SIGNIFICANT CHANGE UP (ref 0–0.7)
EOSINOPHIL NFR BLD AUTO: 1.4 % — SIGNIFICANT CHANGE UP (ref 0–8)
EPI CELLS # UR: ABNORMAL /HPF
GLUCOSE SERPL-MCNC: 126 MG/DL — HIGH (ref 70–99)
GLUCOSE UR QL: NEGATIVE MG/DL — SIGNIFICANT CHANGE UP
HCG SERPL QL: NEGATIVE — SIGNIFICANT CHANGE UP
HCT VFR BLD CALC: 39.9 % — SIGNIFICANT CHANGE UP (ref 37–47)
HGB BLD-MCNC: 13.8 G/DL — SIGNIFICANT CHANGE UP (ref 12–16)
IMM GRANULOCYTES NFR BLD AUTO: 0.2 % — SIGNIFICANT CHANGE UP (ref 0.1–0.3)
KETONES UR-MCNC: NEGATIVE — SIGNIFICANT CHANGE UP
LEUKOCYTE ESTERASE UR-ACNC: ABNORMAL
LYMPHOCYTES # BLD AUTO: 5.6 K/UL — HIGH (ref 1.2–3.4)
LYMPHOCYTES # BLD AUTO: 60.5 % — HIGH (ref 20.5–51.1)
MCHC RBC-ENTMCNC: 30.9 PG — SIGNIFICANT CHANGE UP (ref 27–31)
MCHC RBC-ENTMCNC: 34.6 G/DL — SIGNIFICANT CHANGE UP (ref 32–37)
MCV RBC AUTO: 89.5 FL — SIGNIFICANT CHANGE UP (ref 81–99)
MONOCYTES # BLD AUTO: 0.73 K/UL — HIGH (ref 0.1–0.6)
MONOCYTES NFR BLD AUTO: 7.9 % — SIGNIFICANT CHANGE UP (ref 1.7–9.3)
NEUTROPHILS # BLD AUTO: 2.69 K/UL — SIGNIFICANT CHANGE UP (ref 1.4–6.5)
NEUTROPHILS NFR BLD AUTO: 29.1 % — LOW (ref 42.2–75.2)
NITRITE UR-MCNC: NEGATIVE — SIGNIFICANT CHANGE UP
NRBC # BLD: 0 /100 WBCS — SIGNIFICANT CHANGE UP (ref 0–0)
PH UR: 6 — SIGNIFICANT CHANGE UP (ref 5–8)
PLATELET # BLD AUTO: 129 K/UL — LOW (ref 130–400)
POTASSIUM SERPL-MCNC: 5.2 MMOL/L — HIGH (ref 3.5–5)
POTASSIUM SERPL-SCNC: 5.2 MMOL/L — HIGH (ref 3.5–5)
PROT SERPL-MCNC: 9.1 G/DL — HIGH (ref 6–8)
PROT UR-MCNC: ABNORMAL MG/DL
RBC # BLD: 4.46 M/UL — SIGNIFICANT CHANGE UP (ref 4.2–5.4)
RBC # FLD: 13.6 % — SIGNIFICANT CHANGE UP (ref 11.5–14.5)
SODIUM SERPL-SCNC: 136 MMOL/L — SIGNIFICANT CHANGE UP (ref 135–146)
SP GR SPEC: 1.02 — SIGNIFICANT CHANGE UP (ref 1.01–1.03)
UROBILINOGEN FLD QL: 1 MG/DL (ref 0.2–0.2)
WBC # BLD: 9.25 K/UL — SIGNIFICANT CHANGE UP (ref 4.8–10.8)
WBC # FLD AUTO: 9.25 K/UL — SIGNIFICANT CHANGE UP (ref 4.8–10.8)
WBC UR QL: ABNORMAL /HPF

## 2018-12-07 RX ORDER — PANTOPRAZOLE SODIUM 20 MG/1
40 TABLET, DELAYED RELEASE ORAL
Qty: 0 | Refills: 0 | Status: DISCONTINUED | OUTPATIENT
Start: 2018-12-07 | End: 2018-12-12

## 2018-12-07 RX ORDER — ESCITALOPRAM OXALATE 10 MG/1
10 TABLET, FILM COATED ORAL DAILY
Qty: 0 | Refills: 0 | Status: DISCONTINUED | OUTPATIENT
Start: 2018-12-07 | End: 2018-12-12

## 2018-12-07 RX ORDER — HYDROCHLOROTHIAZIDE 25 MG
12.5 TABLET ORAL
Qty: 0 | Refills: 0 | Status: DISCONTINUED | OUTPATIENT
Start: 2018-12-07 | End: 2018-12-10

## 2018-12-07 RX ORDER — QUETIAPINE FUMARATE 200 MG/1
100 TABLET, FILM COATED ORAL AT BEDTIME
Qty: 0 | Refills: 0 | Status: DISCONTINUED | OUTPATIENT
Start: 2018-12-07 | End: 2018-12-10

## 2018-12-07 RX ORDER — CEFUROXIME AXETIL 250 MG
500 TABLET ORAL ONCE
Qty: 0 | Refills: 0 | Status: COMPLETED | OUTPATIENT
Start: 2018-12-07 | End: 2018-12-07

## 2018-12-07 RX ORDER — AMLODIPINE BESYLATE 2.5 MG/1
10 TABLET ORAL DAILY
Qty: 0 | Refills: 0 | Status: DISCONTINUED | OUTPATIENT
Start: 2018-12-07 | End: 2018-12-10

## 2018-12-07 RX ORDER — METOPROLOL TARTRATE 50 MG
100 TABLET ORAL DAILY
Qty: 0 | Refills: 0 | Status: DISCONTINUED | OUTPATIENT
Start: 2018-12-07 | End: 2018-12-10

## 2018-12-07 RX ORDER — GABAPENTIN 400 MG/1
100 CAPSULE ORAL THREE TIMES A DAY
Qty: 0 | Refills: 0 | Status: DISCONTINUED | OUTPATIENT
Start: 2018-12-07 | End: 2018-12-12

## 2018-12-07 RX ADMIN — Medication 500 MILLIGRAM(S): at 23:20

## 2018-12-07 RX ADMIN — AMLODIPINE BESYLATE 10 MILLIGRAM(S): 2.5 TABLET ORAL at 22:30

## 2018-12-07 RX ADMIN — GABAPENTIN 100 MILLIGRAM(S): 400 CAPSULE ORAL at 23:20

## 2018-12-07 RX ADMIN — ESCITALOPRAM OXALATE 10 MILLIGRAM(S): 10 TABLET, FILM COATED ORAL at 22:30

## 2018-12-07 NOTE — ED PROVIDER NOTE - OBJECTIVE STATEMENT
patient is c/o feeling depressed, is suicidal, denies any plans. Denies any OD on medications, denies any suicidal attempts, denies any trauma, denies any other symptoms.

## 2018-12-07 NOTE — ED PROVIDER NOTE - MEDICAL DECISION MAKING DETAILS
patient remained stable in ED, labs noted, seen by psychiatrist, as recommended, is admitted to psychiatry for further care.

## 2018-12-07 NOTE — ED ADULT NURSE NOTE - OBJECTIVE STATEMENT
patient c/o of depression, has suicdial thoughts, patient states she feels this way during holiday seasons. patient denies a plan to hurt herself. denies hurting others.

## 2018-12-07 NOTE — ED ADULT NURSE NOTE - NSIMPLEMENTINTERV_GEN_ALL_ED
Implemented All Universal Safety Interventions:  [place] to call system. Call bell, personal items and telephone within reach. Instruct patient to call for assistance. Room bathroom lighting operational. Non-slip footwear when patient is off stretcher. Physically safe environment: no spills, clutter or unnecessary equipment. Stretcher in lowest position, wheels locked, appropriate side rails in place.

## 2018-12-07 NOTE — ED BEHAVIORAL HEALTH ASSESSMENT NOTE - RISK ASSESSMENT
Pt has increased risk for self-harm and impaired functioning, needs stablization and medication titration.
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
## 2018-12-07 NOTE — ED BEHAVIORAL HEALTH ASSESSMENT NOTE - PRIMARY DX
Major depressive disorder, recurrent episode with melancholic features Deferred condition on axis II
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
## 2018-12-07 NOTE — ED BEHAVIORAL HEALTH ASSESSMENT NOTE - SUMMARY
52 yo  single female living on social security. She has significant psychiatric hx of past trauma, substance abuse/abstinence over 18 years, depression with SAs and IPPs, who complains of worsening depression and active suicidal ideation and intent.

## 2018-12-07 NOTE — ED BEHAVIORAL HEALTH ASSESSMENT NOTE - SUICIDE RISK FACTORS
Hopelessness/Agitation/severe anxiety/History of abuse/trauma/Anhedonia/Unable to engage in safety planning/Global insomnia/Access to means (pills, firearms, etc.)/Mood episode/Chronic pain or acute medical issue

## 2018-12-07 NOTE — ED ADULT TRIAGE NOTE - CHIEF COMPLAINT QUOTE
suicidal thoughts, alert and in no distress. suicidal thoughts, alert and in no distress. 1:1 sit initiated in triage.

## 2018-12-08 DIAGNOSIS — I10 ESSENTIAL (PRIMARY) HYPERTENSION: ICD-10-CM

## 2018-12-08 LAB
CULTURE RESULTS: SIGNIFICANT CHANGE UP
SPECIMEN SOURCE: SIGNIFICANT CHANGE UP

## 2018-12-08 RX ORDER — NICOTINE POLACRILEX 2 MG
4 GUM BUCCAL
Qty: 0 | Refills: 0 | Status: DISCONTINUED | OUTPATIENT
Start: 2018-12-08 | End: 2018-12-08

## 2018-12-08 RX ORDER — BUPRENORPHINE AND NALOXONE 2; .5 MG/1; MG/1
2 TABLET SUBLINGUAL DAILY
Qty: 0 | Refills: 0 | Status: DISCONTINUED | OUTPATIENT
Start: 2018-12-08 | End: 2018-12-09

## 2018-12-08 RX ORDER — NICOTINE POLACRILEX 2 MG
2 GUM BUCCAL
Qty: 0 | Refills: 0 | Status: DISCONTINUED | OUTPATIENT
Start: 2018-12-08 | End: 2018-12-12

## 2018-12-08 RX ADMIN — GABAPENTIN 100 MILLIGRAM(S): 400 CAPSULE ORAL at 13:26

## 2018-12-08 RX ADMIN — ESCITALOPRAM OXALATE 10 MILLIGRAM(S): 10 TABLET, FILM COATED ORAL at 11:24

## 2018-12-08 RX ADMIN — Medication 12.5 MILLIGRAM(S): at 08:04

## 2018-12-08 RX ADMIN — PANTOPRAZOLE SODIUM 40 MILLIGRAM(S): 20 TABLET, DELAYED RELEASE ORAL at 08:03

## 2018-12-08 RX ADMIN — Medication 12.5 MILLIGRAM(S): at 17:19

## 2018-12-08 RX ADMIN — GABAPENTIN 100 MILLIGRAM(S): 400 CAPSULE ORAL at 20:43

## 2018-12-08 RX ADMIN — BUPRENORPHINE AND NALOXONE 2 TABLET(S): 2; .5 TABLET SUBLINGUAL at 17:44

## 2018-12-08 RX ADMIN — Medication 100 MILLIGRAM(S): at 08:04

## 2018-12-08 RX ADMIN — AMLODIPINE BESYLATE 10 MILLIGRAM(S): 2.5 TABLET ORAL at 08:04

## 2018-12-08 RX ADMIN — GABAPENTIN 100 MILLIGRAM(S): 400 CAPSULE ORAL at 08:03

## 2018-12-08 RX ADMIN — QUETIAPINE FUMARATE 100 MILLIGRAM(S): 200 TABLET, FILM COATED ORAL at 20:43

## 2018-12-08 NOTE — CONSULT NOTE ADULT - SUBJECTIVE AND OBJECTIVE BOX
ANA FERRELL  53y  Female      Patient is a 53y old  Female who presents with a chief complaint of Pt presented to the ED for an increase in depression x 4 weeks and suicidal ideations to harm self by over dose (08 Dec 2018 00:43)    HPI:    INTERVAL HPI/OVERNIGHT EVENTS:  HEALTH ISSUES - PROBLEM Dx:  Deferred condition on axis II  Major depressive disorder, recurrent episode with melancholic features  stabel in bed  feels like has a cold      PAST MEDICAL & SURGICAL HISTORY:  Hypertension  Panic attacks  Depression  Anxiety  H/O:   History of cholecystectomy    FAMILY HISTORY:  No pertinent family history in first degree relatives    amLODIPine   Tablet 10 milliGRAM(s) Oral daily  escitalopram 10 milliGRAM(s) Oral daily  gabapentin 100 milliGRAM(s) Oral three times a day  hydrochlorothiazide 12.5 milliGRAM(s) Oral two times a day  metoprolol succinate  milliGRAM(s) Oral daily  nicotine  Polacrilex Gum 2 milliGRAM(s) Oral every 3 hours PRN  pantoprazole    Tablet 40 milliGRAM(s) Oral before breakfast  QUEtiapine 100 milliGRAM(s) Oral at bedtime      REVIEW OF SYSTEMS:  CONSTITUTIONAL: No fever, weight loss, or fatigue  EYES: No eye pain, visual disturbances, or discharge  ENMT:  No difficulty hearing, tinnitus, vertigo; No sinus or throat pain  NECK: No pain or stiffness  BREASTS: No pain, masses, or nipple discharge  RESPIRATORY: No cough, wheezing, chills or hemoptysis; No shortness of breath  CARDIOVASCULAR: No chest pain, palpitations, dizziness, or leg swelling  GASTROINTESTINAL: No abdominal or epigastric pain. No nausea, vomiting, or hematemesis; No diarrhea or constipation. No melena or hematochezia.  GENITOURINARY: No dysuria, frequency, hematuria, or incontinence  NEUROLOGICAL: No headaches, memory loss, loss of strength, numbness, or tremors  SKIN: No itching, burning, rashes, or lesions   LYMPH NODES: No enlarged glands  ENDOCRINE: No heat or cold intolerance; No hair loss  MUSCULOSKELETAL: No joint pain or swelling; No muscle, back, or extremity pain  PSYCHIATRIC: as per hpi and previous psych history  HEME/LYMPH: No easy bruising, or bleeding gums  ALLERY AND IMMUNOLOGIC: No hives or eczema    T(C): 36.2 (18 @ 00:40), Max: 36.4 (18 @ 18:16)  HR: 60 (18 @ 00:40) (60 - 86)  BP: 148/77 (18 @ 00:40) (148/77 - 168/77)  RR: 20 (18 @ 00:40) (18 - 20)  SpO2: 95% (18 @ 18:16) (95% - 95%)  Wt(kg): --Vital Signs Last 24 Hrs  T(C): 36.2 (08 Dec 2018 00:40), Max: 36.4 (07 Dec 2018 18:16)  T(F): 97.1 (08 Dec 2018 00:40), Max: 97.6 (07 Dec 2018 18:16)  HR: 60 (08 Dec 2018 00:40) (60 - 86)  BP: 148/77 (08 Dec 2018 00:40) (148/77 - 168/77)  BP(mean): --  RR: 20 (08 Dec 2018 00:40) (18 - 20)  SpO2: 95% (07 Dec 2018 18:16) (95% - 95%)    PHYSICAL EXAM:  GENERAL: NAD,well-developed  HEAD:  Atraumatic, Normocephalic  EYES: EOMI, PERRLA, conjunctiva and sclera clear  ENMT: No tonsillar erythema, exudates, or enlargement; Moist mucous membranes, Good dentition, No lesions  NECK: Supple, No JVD, Normal thyroid  NERVOUS SYSTEM:  Alert & Oriented X3, Good concentration; Motor Strength 5/5 B/L upper and lower extremities; DTRs 2+ intact and symmetric  CHEST/LUNG: Clear bs bilaterally; No rales, rhonchi, wheezing  HEART: Regular rate and rhythm; No murmurs, rubs, or gallops  ABDOMEN: Soft, Nontender, Nondistended; Bowel sounds present  EXTREMITIES:  2+ Peripheral Pulses, No clubbing, cyanosis, or edema  LYMPH: No lymphadenopathy noted  SKIN: No rashes or lesions  Neuro: alert  no focal deficits    Consultant(s) Notes Reviewed:  [x ] YES  [ ] NO  Care Discussed with Consultants/Other Providers [ x] YES  [ ] NO    LABS:                        13.8   9.25  )-----------( 129      ( 07 Dec 2018 22:00 )             39.9         136  |  95<L>  |  17  ----------------------------<  126<H>  5.2<H>   |  29  |  0.5<L>    Ca    9.2      07 Dec 2018 20:30    TPro  9.1<H>  /  Alb  4.1  /  TBili  0.4  /  DBili  <0.2  /  AST  168<H>  /  ALT  257<H>  /  AlkPhos  109  12-07      Urinalysis Basic - ( 07 Dec 2018 20:30 )    Color: Yellow / Appearance: Clear / S.020 / pH: x  Gluc: x / Ketone: Negative  / Bili: Negative / Urobili: 1.0 mg/dL   Blood: x / Protein: Trace mg/dL / Nitrite: Negative   Leuk Esterase: Moderate / RBC: x / WBC 6-10 /HPF   Sq Epi: x / Non Sq Epi: Occasional /HPF / Bacteria: x      CAPILLARY BLOOD GLUCOSE            Urinalysis Basic - ( 07 Dec 2018 20:30 )    Color: Yellow / Appearance: Clear / S.020 / pH: x  Gluc: x / Ketone: Negative  / Bili: Negative / Urobili: 1.0 mg/dL   Blood: x / Protein: Trace mg/dL / Nitrite: Negative   Leuk Esterase: Moderate / RBC: x / WBC 6-10 /HPF   Sq Epi: x / Non Sq Epi: Occasional /HPF / Bacteria: x        RADIOLOGY & ADDITIONAL TESTS:    Imaging Personally Reviewed:  [ ] YES  [ ] NO

## 2018-12-08 NOTE — PATIENT PROFILE BEHAVIORAL HEALTH - REASON FOR ADMISSION
Pt presented to the ED for an increase in depression x 4 weeks and suicidal ideations to harm self by over dose

## 2018-12-08 NOTE — CONSULT NOTE ADULT - ASSESSMENT
medically stable with no acute issues   see hpi   pa note reveiwd   will monitor if worsenign cold sxs

## 2018-12-09 LAB — TSH SERPL-MCNC: 2.01 UIU/ML — SIGNIFICANT CHANGE UP (ref 0.27–4.2)

## 2018-12-09 RX ORDER — BUPRENORPHINE AND NALOXONE 2; .5 MG/1; MG/1
1 TABLET SUBLINGUAL EVERY 24 HOURS
Qty: 0 | Refills: 0 | Status: DISCONTINUED | OUTPATIENT
Start: 2018-12-10 | End: 2018-12-12

## 2018-12-09 RX ORDER — BUPRENORPHINE AND NALOXONE 2; .5 MG/1; MG/1
1 TABLET SUBLINGUAL EVERY 24 HOURS
Qty: 0 | Refills: 0 | Status: DISCONTINUED | OUTPATIENT
Start: 2018-12-09 | End: 2018-12-12

## 2018-12-09 RX ORDER — BUPRENORPHINE AND NALOXONE 2; .5 MG/1; MG/1
1 TABLET SUBLINGUAL ONCE
Qty: 0 | Refills: 0 | Status: DISCONTINUED | OUTPATIENT
Start: 2018-12-09 | End: 2018-12-09

## 2018-12-09 RX ADMIN — GABAPENTIN 100 MILLIGRAM(S): 400 CAPSULE ORAL at 20:24

## 2018-12-09 RX ADMIN — Medication 12.5 MILLIGRAM(S): at 17:36

## 2018-12-09 RX ADMIN — PANTOPRAZOLE SODIUM 40 MILLIGRAM(S): 20 TABLET, DELAYED RELEASE ORAL at 10:12

## 2018-12-09 RX ADMIN — GABAPENTIN 100 MILLIGRAM(S): 400 CAPSULE ORAL at 13:00

## 2018-12-09 RX ADMIN — GABAPENTIN 100 MILLIGRAM(S): 400 CAPSULE ORAL at 09:08

## 2018-12-09 RX ADMIN — QUETIAPINE FUMARATE 100 MILLIGRAM(S): 200 TABLET, FILM COATED ORAL at 20:24

## 2018-12-09 RX ADMIN — Medication 100 MILLIGRAM(S): at 09:07

## 2018-12-09 RX ADMIN — ESCITALOPRAM OXALATE 10 MILLIGRAM(S): 10 TABLET, FILM COATED ORAL at 12:19

## 2018-12-09 RX ADMIN — BUPRENORPHINE AND NALOXONE 1 TABLET(S): 2; .5 TABLET SUBLINGUAL at 10:13

## 2018-12-09 RX ADMIN — BUPRENORPHINE AND NALOXONE 1 TABLET(S): 2; .5 TABLET SUBLINGUAL at 10:53

## 2018-12-09 RX ADMIN — BUPRENORPHINE AND NALOXONE 2 TABLET(S): 2; .5 TABLET SUBLINGUAL at 17:35

## 2018-12-09 RX ADMIN — BUPRENORPHINE AND NALOXONE 1 TABLET(S): 2; .5 TABLET SUBLINGUAL at 20:25

## 2018-12-09 RX ADMIN — Medication 12.5 MILLIGRAM(S): at 09:04

## 2018-12-09 RX ADMIN — AMLODIPINE BESYLATE 10 MILLIGRAM(S): 2.5 TABLET ORAL at 09:05

## 2018-12-09 NOTE — BEHAVIORAL HEALTH ASSESSMENT NOTE - SUMMARY
3 yr old female  with h/o depression and poly substance abuse  presented to ED with worsening of depression , contemplating with suicidal ideations and  going through multiple psychosocial stresses.   during evaluation , pt is feeling  relatively better , but still depress. denies suicidal ideations intent or plan .   No overt psychosis or kedar. slept last well and complaint with medications. denies any withdrawals symptoms

## 2018-12-09 NOTE — BEHAVIORAL HEALTH ASSESSMENT NOTE - NSBHCHARTREVIEWVS_PSY_A_CORE FT
Vital Signs Last 24 Hrs  T(C): 36.4 (09 Dec 2018 19:26), Max: 36.6 (09 Dec 2018 05:57)  T(F): 97.6 (09 Dec 2018 19:26), Max: 97.9 (09 Dec 2018 05:57)  HR: 68 (09 Dec 2018 19:26) (67 - 69)  BP: 128/79 (09 Dec 2018 19:26) (109/57 - 128/79)  BP(mean): --  RR: 18 (09 Dec 2018 19:26) (16 - 18)  SpO2: --

## 2018-12-09 NOTE — BEHAVIORAL HEALTH ASSESSMENT NOTE - HPI (INCLUDE ILLNESS QUALITY, SEVERITY, DURATION, TIMING, CONTEXT, MODIFYING FACTORS, ASSOCIATED SIGNS AND SYMPTOMS)
53 yr old female  with h/o depression and poly substance abuse  presented to ED with worsening of depression , contemplating with suicidal ideations and  going through multiple psychosocial stresses.   during evaluation , pt is feeling  relatively better , but still depress. denies suicidal ideations intent or plan .   No overt psychosis or kedar. slept last well and complaint with medications. denies any withdrawals symptoms

## 2018-12-10 RX ORDER — METOPROLOL TARTRATE 50 MG
100 TABLET ORAL
Qty: 0 | Refills: 0 | Status: DISCONTINUED | OUTPATIENT
Start: 2018-12-10 | End: 2018-12-12

## 2018-12-10 RX ORDER — AMLODIPINE BESYLATE 2.5 MG/1
5 TABLET ORAL ONCE
Qty: 0 | Refills: 0 | Status: DISCONTINUED | OUTPATIENT
Start: 2018-12-11 | End: 2018-12-12

## 2018-12-10 RX ORDER — HYDROCHLOROTHIAZIDE 25 MG
12.5 TABLET ORAL DAILY
Qty: 0 | Refills: 0 | Status: DISCONTINUED | OUTPATIENT
Start: 2018-12-11 | End: 2018-12-12

## 2018-12-10 RX ORDER — METOPROLOL TARTRATE 50 MG
100 TABLET ORAL
Qty: 0 | Refills: 0 | Status: DISCONTINUED | OUTPATIENT
Start: 2018-12-10 | End: 2018-12-10

## 2018-12-10 RX ADMIN — Medication 100 MILLIGRAM(S): at 20:15

## 2018-12-10 RX ADMIN — GABAPENTIN 100 MILLIGRAM(S): 400 CAPSULE ORAL at 06:36

## 2018-12-10 RX ADMIN — GABAPENTIN 100 MILLIGRAM(S): 400 CAPSULE ORAL at 21:38

## 2018-12-10 RX ADMIN — PANTOPRAZOLE SODIUM 40 MILLIGRAM(S): 20 TABLET, DELAYED RELEASE ORAL at 06:36

## 2018-12-10 RX ADMIN — GABAPENTIN 100 MILLIGRAM(S): 400 CAPSULE ORAL at 16:54

## 2018-12-10 RX ADMIN — BUPRENORPHINE AND NALOXONE 1 TABLET(S): 2; .5 TABLET SUBLINGUAL at 20:14

## 2018-12-10 RX ADMIN — BUPRENORPHINE AND NALOXONE 1 TABLET(S): 2; .5 TABLET SUBLINGUAL at 08:41

## 2018-12-10 RX ADMIN — BUPRENORPHINE AND NALOXONE 1 TABLET(S): 2; .5 TABLET SUBLINGUAL at 20:31

## 2018-12-10 RX ADMIN — Medication 12.5 MILLIGRAM(S): at 06:36

## 2018-12-10 RX ADMIN — ESCITALOPRAM OXALATE 10 MILLIGRAM(S): 10 TABLET, FILM COATED ORAL at 11:08

## 2018-12-10 RX ADMIN — AMLODIPINE BESYLATE 10 MILLIGRAM(S): 2.5 TABLET ORAL at 06:36
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
## 2018-12-10 NOTE — PROGRESS NOTE ADULT - SUBJECTIVE AND OBJECTIVE BOX
pt stable alert in NAD  no new complaints    SUICIDAL THOUGHTS; UTI  ^PSYCH EVAL  No pertinent family history in first degree relatives  Handoff  MEWS Score  Hypertension  Panic attacks  Depression  Anxiety  Suicidal thoughts  Essential hypertension  Deferred condition on axis II  Major depressive disorder, recurrent episode with melancholic features  H/O:   History of cholecystectomy  PSYCH EVAL  49  UTI (urinary tract infection)    HEALTH ISSUES - PROBLEM Dx:  Essential hypertension: Essential hypertension  Deferred condition on axis II  Major depressive disorder, recurrent episode with melancholic features        PAST MEDICAL & SURGICAL HISTORY:  Hypertension  Panic attacks  Depression  Anxiety  H/O:   History of cholecystectomy    Benadryl (Unknown)      FAMILY HISTORY:  No pertinent family history in first degree relatives      amLODIPine   Tablet 10 milliGRAM(s) Oral daily  buprenorphine 8 mG/naloxone 2 mG SL  Tablet 1 Tablet(s) SubLingual every 24 hours  buprenorphine 8 mG/naloxone 2 mG SL  Tablet 1 Tablet(s) SubLingual every 24 hours  escitalopram 10 milliGRAM(s) Oral daily  gabapentin 100 milliGRAM(s) Oral three times a day  hydrochlorothiazide 12.5 milliGRAM(s) Oral two times a day  metoprolol succinate  milliGRAM(s) Oral daily  nicotine  Polacrilex Gum 2 milliGRAM(s) Oral every 3 hours PRN  pantoprazole    Tablet 40 milliGRAM(s) Oral before breakfast  QUEtiapine 100 milliGRAM(s) Oral at bedtime      T(C): 36.6 (12-10-18 @ 06:47), Max: 36.6 (18 @ 09:49)  HR: 55 (12-10-18 @ 06:47) (55 - 69)  BP: 109/59 (12-10-18 @ 06:47) (109/59 - 128/79)  RR: 18 (12-10-18 @ 06:47) (16 - 18)  SpO2: --    PE;  general:  nop acute changes in nad    Lungs:    Heart:    EXT:    Neuro:  alert no defificts      13.8  39.9  9.25  --  --  --  --  --  --  --  17  0.5  5.2  126                      CAPILLARY BLOOD GLUCOSE

## 2018-12-10 NOTE — PROGRESS NOTE BEHAVIORAL HEALTH - NSBHADDHXPSYCHFT_PSY_A_CORE
As per I stop-  10/31/2018	10/31/2018	buprenorphine-naloxone 8-2 mg sl tablet	56	28	Kush Rowe MD  09/26/2018	09/27/2018	buprenorphine-naloxone 8-2 mg sl tablet	56	28	Kush Rowe MD  08/29/2018	08/29/2018	buprenorphine-naloxone 8-2 mg sl tablet	56	28	Stephany Armstrong A  08/20/2018	08/20/2018	buprenorphine-naloxone 8-2 mg sl tablet	16	8	Stephany Armstrong A  07/23/2018	07/23/2018	buprenorphine-naloxone 8-2 mg sl tablet	56	28	Stephany Armstrong A  06/20/2018	06/20/2018	buprenorphine-naloxone 8-2 mg sl tablet	56	28	Kush Rowe MD  12/13/2017	12/13/2017	buprenorphine-naloxone 8-2 mg sl tablet	56	28	Kush Rowe MD

## 2018-12-10 NOTE — PROGRESS NOTE BEHAVIORAL HEALTH - SUMMARY
52 yo female, privately domiciled, employed, living with family, hx of multiple IPP admissions, 1 prior SA in remote past, disconnected from OP treatment, reports compliant with meds, hx of opiate use d/o on Suboxone, presenting to IPP with MDD. In ED, pt presented with suicidal ideation and depressed mood and hopelessness. Today, pt presents with anxiety about psychosocial stressors, without suicidal ideation, intent, or plan, with future orientation and hopefulness, requesting engagement in PHP. Home meds confirmed with collateral. Pt requires continued IPP admission for med management and safety.    # bipolar d/o vs MDD  - c/w Lexapro 10mg PO  -c/w Gabapentin 100mg PO q8h  - Seroquel d/c'ed    #opiate use d/o  - c/w Suboxone 1 tab q12h  - I stop checked  - pt has OP apt this Wed with Dr. Cisse    #chronic med issues  - home meds continued 52 yo female, privately domiciled, employed, living with family, hx of multiple IPP admissions, 1 prior SA in remote past, disconnected from OP treatment, reports compliant with meds, hx of opiate use d/o on Suboxone, presenting to IPP with MDD. In ED, pt presented with suicidal ideation and depressed mood and hopelessness. Today, pt presents with anxiety about psychosocial stressors, without suicidal ideation, intent, or plan, with future orientation and hopefulness, requesting engagement in PHP. Home meds confirmed with collateral. Pt requires continued IPP admission for med management and safety.    # MDD  - c/w Lexapro 10mg PO  -c/w Gabapentin 100mg PO q8h  - Seroquel d/c'ed    #opiate use d/o  - c/w Suboxone 1 tab q12h  - I stop checked  - pt has OP apt this Wed with Dr. Cisse    #chronic med issues  - home meds continued

## 2018-12-10 NOTE — PROGRESS NOTE BEHAVIORAL HEALTH - NSBHFUPSTRENGTHS_PSY_A_CORE
Attempting to realize his/her potential/Steady employment/Has access to housing/residential stability/Awareness of substance use issues
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
## 2018-12-11 RX ORDER — CITALOPRAM 10 MG/1
1 TABLET, FILM COATED ORAL
Qty: 0 | Refills: 0 | COMMUNITY

## 2018-12-11 RX ORDER — ESCITALOPRAM OXALATE 10 MG/1
1 TABLET, FILM COATED ORAL
Qty: 0 | Refills: 0 | COMMUNITY
Start: 2018-12-11

## 2018-12-11 RX ORDER — GABAPENTIN 400 MG/1
1 CAPSULE ORAL
Qty: 0 | Refills: 0 | DISCHARGE
Start: 2018-12-11

## 2018-12-11 RX ORDER — IBUPROFEN 200 MG
400 TABLET ORAL EVERY 8 HOURS
Qty: 0 | Refills: 0 | Status: DISCONTINUED | OUTPATIENT
Start: 2018-12-11 | End: 2018-12-12

## 2018-12-11 RX ORDER — QUETIAPINE FUMARATE 200 MG/1
0 TABLET, FILM COATED ORAL
Qty: 0 | Refills: 0 | COMMUNITY

## 2018-12-11 RX ORDER — GABAPENTIN 400 MG/1
0 CAPSULE ORAL
Qty: 0 | Refills: 0 | COMMUNITY

## 2018-12-11 RX ORDER — ESCITALOPRAM OXALATE 10 MG/1
1 TABLET, FILM COATED ORAL
Qty: 14 | Refills: 0
Start: 2018-12-11 | End: 2018-12-24

## 2018-12-11 RX ORDER — BUPRENORPHINE AND NALOXONE 2; .5 MG/1; MG/1
0 TABLET SUBLINGUAL
Qty: 0 | Refills: 0 | DISCHARGE
Start: 2018-12-11

## 2018-12-11 RX ADMIN — Medication 400 MILLIGRAM(S): at 14:30

## 2018-12-11 RX ADMIN — Medication 100 MILLIGRAM(S): at 20:23

## 2018-12-11 RX ADMIN — GABAPENTIN 100 MILLIGRAM(S): 400 CAPSULE ORAL at 06:05

## 2018-12-11 RX ADMIN — BUPRENORPHINE AND NALOXONE 1 TABLET(S): 2; .5 TABLET SUBLINGUAL at 08:24

## 2018-12-11 RX ADMIN — Medication 100 MILLIGRAM(S): at 08:23

## 2018-12-11 RX ADMIN — BUPRENORPHINE AND NALOXONE 1 TABLET(S): 2; .5 TABLET SUBLINGUAL at 20:23

## 2018-12-11 RX ADMIN — Medication 12.5 MILLIGRAM(S): at 08:23

## 2018-12-11 RX ADMIN — Medication 400 MILLIGRAM(S): at 13:48

## 2018-12-11 RX ADMIN — ESCITALOPRAM OXALATE 10 MILLIGRAM(S): 10 TABLET, FILM COATED ORAL at 13:46

## 2018-12-11 RX ADMIN — GABAPENTIN 100 MILLIGRAM(S): 400 CAPSULE ORAL at 13:45

## 2018-12-11 RX ADMIN — PANTOPRAZOLE SODIUM 40 MILLIGRAM(S): 20 TABLET, DELAYED RELEASE ORAL at 06:06

## 2018-12-11 RX ADMIN — BUPRENORPHINE AND NALOXONE 1 TABLET(S): 2; .5 TABLET SUBLINGUAL at 20:54

## 2018-12-11 RX ADMIN — GABAPENTIN 100 MILLIGRAM(S): 400 CAPSULE ORAL at 21:38

## 2018-12-11 RX ADMIN — BUPRENORPHINE AND NALOXONE 1 TABLET(S): 2; .5 TABLET SUBLINGUAL at 09:28

## 2018-12-11 NOTE — DISCHARGE NOTE BEHAVIORAL HEALTH - NSBHDCSUICFCTRMIT_PSY_A_CORE
f/u at [facility], pt reports she is going to address issue of children living at her house, stable on suboxone tx, currently euthymic, insight into illness and triggers

## 2018-12-11 NOTE — DISCHARGE NOTE BEHAVIORAL HEALTH - MEDICATION SUMMARY - MEDICATIONS TO TAKE
I will START or STAY ON the medications listed below when I get home from the hospital:    buprenorphine-naloxone 8 mg-2 mg sublingual tablet  -- 1 tab under tongue 2 times a day  -- Indication: For opiate use d/o    gabapentin 100 mg oral capsule  -- 1 cap(s) by mouth 3 times a day  -- Indication: For Major depressive disorder, recurrent episode with melancholic features    escitalopram 10 mg oral tablet  -- 1 tab(s) by mouth once a day  -- Indication: For Major depressive disorder, recurrent episode with melancholic features    Metoprolol Tartrate 100 mg oral tablet  -- 1 tab(s) by mouth 2 times a day  -- Indication: For htn    amLODIPine 5 mg oral tablet  -- 1 tab(s) by mouth once a day  -- Indication: For htn    hydroCHLOROthiazide 12.5 mg oral capsule  -- 1 cap(s) by mouth once a day  -- Indication: For htn    omeprazole 20 mg oral delayed release capsule  -- 1 cap(s) by mouth once a day  -- Indication: For gerd I will START or STAY ON the medications listed below when I get home from the hospital:    buprenorphine-naloxone 8 mg-2 mg sublingual tablet  -- 1 tab under tongue 2 times a day  -- Indication: For opiate use d/o    gabapentin 100 mg oral capsule  -- 1 cap(s) by mouth 3 times a day  -- Indication: For Major depressive disorder, recurrent episode with melancholic features    escitalopram 10 mg oral tablet  -- 1 tab(s) by mouth once a day x 14 days MDD:for depression  -- Check with your doctor before becoming pregnant.  It is very important that you take or use this exactly as directed.  Do not skip doses or discontinue unless directed by your doctor.  May cause drowsiness.  Alcohol may intensify this effect.  Use care when operating dangerous machinery.  Obtain medical advice before taking any non-prescription drugs as some may affect the action of this medication.  This drug may impair the ability to drive or operate machinery.  Use care until you become familiar with its effects.    -- Indication: For Major depressive disorder, recurrent episode with melancholic features    Metoprolol Tartrate 100 mg oral tablet  -- 1 tab(s) by mouth 2 times a day  -- Indication: For htn    amLODIPine 5 mg oral tablet  -- 1 tab(s) by mouth once a day  -- Indication: For htn    hydroCHLOROthiazide 12.5 mg oral capsule  -- 1 cap(s) by mouth once a day  -- Indication: For htn    omeprazole 20 mg oral delayed release capsule  -- 1 cap(s) by mouth once a day  -- Indication: For gerd

## 2018-12-11 NOTE — DISCHARGE NOTE BEHAVIORAL HEALTH - NSBHDCRESPONSEFT_PSY_A_CORE
Pt has made significant progress over the course of hospitalization. With continuous psychotherapy from the treatment team and the medications, patient reports feeling better, sleeping and eating well. Thought process and insight improved. Pt was calm and cooperative and was in good behavioral control. Patient denied any suicidal or homicidal ideations. Patient denied any auditory or visual hallucinations. Pt became more visible on the unit, was attending and participating in groups. Pt was evaluated by treatment team, pt is stable for discharge and patient shows no imminent danger to self, others or property at this time.

## 2018-12-11 NOTE — DISCHARGE NOTE BEHAVIORAL HEALTH - NSBHDCALCOHOLREFERFT_PSY_A_CORE
Dr. Cisse is the patient's doctor, who monitors Suboxone administration. Patient has follow up appointment today, Wednesday December 12.

## 2018-12-11 NOTE — PROGRESS NOTE BEHAVIORAL HEALTH - SUMMARY
52 yo female, privately domiciled, employed, living with family, hx of multiple IPP admissions, 1 prior SA in remote past, disconnected from OP treatment, reports compliant with meds, hx of opiate use d/o on Suboxone, presenting to IPP with MDD. In ED, pt presented with suicidal ideation and depressed mood and hopelessness. Today, pt presents with euthymic mood, without suicidal ideation, intent, or plan, with future orientation and hopefulness. D/c planning for tomorrow. Pt requires continued IPP admission for safe d/c planning.    # MDD  - c/w Lexapro 10mg PO  -c/w Gabapentin 100mg PO q8h  - Seroquel d/c'ed    #opiate use d/o  - c/w Suboxone 1 tab q12h  - I stop checked  - pt has OP apt this Wed with Dr. Cisse

## 2018-12-11 NOTE — PROGRESS NOTE BEHAVIORAL HEALTH - NSBHCHARTREVIEWVS_PSY_A_CORE FT
Vital Signs Last 24 Hrs  T(C): 36.1 (11 Dec 2018 10:49), Max: 36.3 (10 Dec 2018 11:38)  T(F): 97 (11 Dec 2018 10:49), Max: 97.3 (10 Dec 2018 11:38)  HR: 76 (11 Dec 2018 10:49) (58 - 76)  BP: 140/87 (11 Dec 2018 10:49) (105/52 - 140/87)  BP(mean): --  RR: 20 (11 Dec 2018 06:09) (17 - 20)  SpO2: --
ICU Vital Signs Last 24 Hrs  T(C): 36.3 (10 Dec 2018 11:38), Max: 36.6 (10 Dec 2018 06:47)  T(F): 97.3 (10 Dec 2018 11:38), Max: 97.8 (10 Dec 2018 06:47)  HR: 74 (10 Dec 2018 11:38) (55 - 74)  BP: 114/60 (10 Dec 2018 11:38) (109/59 - 128/79)  BP(mean): --  ABP: --  ABP(mean): --  RR: 17 (10 Dec 2018 11:38) (17 - 18)  SpO2: --

## 2018-12-11 NOTE — PROGRESS NOTE BEHAVIORAL HEALTH - PRIMARY DX
Major depressive disorder, recurrent episode with melancholic features
Major depressive disorder, recurrent episode with melancholic features

## 2018-12-11 NOTE — DISCHARGE NOTE BEHAVIORAL HEALTH - PROVIDER TOKENS
FREE:[LAST:[[facility]],PHONE:[(   )    -],FAX:[(   )    -],ADDRESS:[[facility] [facility]]]

## 2018-12-11 NOTE — DISCHARGE NOTE BEHAVIORAL HEALTH - MEDICATION SUMMARY - MEDICATIONS TO STOP TAKING
I will STOP taking the medications listed below when I get home from the hospital:  None I will STOP taking the medications listed below when I get home from the hospital:    citalopram 10 mg oral tablet  -- 1 tab(s) by mouth once a day

## 2018-12-11 NOTE — DISCHARGE NOTE BEHAVIORAL HEALTH - NSBHDCMEDSFT_PSY_A_CORE
Patient was restarted on her outpatient medication which include Suboxone 8mg qd, Lexapro 10 mg qd, Gabapentin 100 mg TID. Pt was also initially started on Seroquel 100 mg qhs for insomnia however reported that it was making her feel tired in the morning. Seroquel was discontinued and pt reported feeling better in the morning. Patient was compliant with the medications and denied any side-effects of the medications.

## 2018-12-11 NOTE — PROGRESS NOTE BEHAVIORAL HEALTH - NSBHFUPINTERVALHXFT_PSY_A_CORE
No acute events over night. Pt reports she feels better, reporting eating and sleeping well. She reports euthymic mood, denies suicidal/ homicidal ideation, intent, or plan. Pt says this hospitalization has given her the clarity and strength to put her and younger son's needs before her older son's. She reports she plans to f/u at [address], and has 6-7 pills of her medication left.

## 2018-12-11 NOTE — CHART NOTE - NSCHARTNOTEFT_GEN_A_CORE
contacted [facility] ([phone]) to inquire about follow up care for patient as part of discharge plan.      Patient's clinical documentation has been faxed ([phone]) and reviewed by staff, who will be taking her case. Patient's outpatient appointment is scheduled for Tuesday December 18 at 1:45 pm at [facility].     confirmed that patient will be discharged tomorrow, Wednesday 12 December, to attend appointment with Dr. Cisse for suboxone treatment.

## 2018-12-11 NOTE — DISCHARGE NOTE BEHAVIORAL HEALTH - NSBHDCSWCOMMENTSFT_PSY_A_CORE
Discharge summary faxed to [facility] Evaluation and Referral Department at [phone] on December 12, 2018

## 2018-12-11 NOTE — DISCHARGE NOTE BEHAVIORAL HEALTH - MEDICATION SUMMARY - MEDICATIONS TO CHANGE
I will SWITCH the dose or number of times a day I take the medications listed below when I get home from the hospital:    gabapentin 100 mg oral capsule  -- BID    QUEtiapine 100 mg oral tablet  -- daily

## 2018-12-11 NOTE — PROGRESS NOTE BEHAVIORAL HEALTH - CASE SUMMARY
Pt was seen and discussed with the resident. Chart reviewed. Agree with assessment and plan above. On evaluation, pt reports that she is doing well. States that she is ready to go home tomorrow, and has an appointment with her Suboxone doctor tomorrow. Pt has been compliant with medication, denied side effects. Is future-oriented, states that she knows she has to be strong for her youngest son. States that she is ready to put her foot down with her oldest son and knows she needs to put her own needs above his. Denied suicidal/homicidal ideation, intent or plan. Continue with medications as above.
Pt was seen and discussed with the resident. Chart reviewed. Agree with assessment and plan above. On evaluation, pt reports feeling better. Denied any further suicidal/homicidal thoughts. Continue with medications as above.

## 2018-12-11 NOTE — PROGRESS NOTE BEHAVIORAL HEALTH - RISK ASSESSMENT
pt's psychosocial stressors and hx of suicidality are risk factors that can be mitigated by lack of current suicidality, willingness to come to ED, help seeking behaviour, supportive network, med compliance, and future orientation.
pt's psychosocial stressors and hx of suicidality are risk factors that can be mitigated by lack of current suicidality, willingness to come to ED, help seeking behaviour, supportive network, med compliance, and future orientation.
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
## 2018-12-12 VITALS
SYSTOLIC BLOOD PRESSURE: 118 MMHG | DIASTOLIC BLOOD PRESSURE: 61 MMHG | TEMPERATURE: 98 F | HEART RATE: 68 BPM | RESPIRATION RATE: 18 BRPM

## 2018-12-12 RX ADMIN — BUPRENORPHINE AND NALOXONE 1 TABLET(S): 2; .5 TABLET SUBLINGUAL at 08:34

## 2018-12-12 RX ADMIN — Medication 12.5 MILLIGRAM(S): at 08:34

## 2018-12-12 RX ADMIN — Medication 100 MILLIGRAM(S): at 08:34

## 2018-12-12 RX ADMIN — PANTOPRAZOLE SODIUM 40 MILLIGRAM(S): 20 TABLET, DELAYED RELEASE ORAL at 06:08

## 2018-12-12 RX ADMIN — GABAPENTIN 100 MILLIGRAM(S): 400 CAPSULE ORAL at 06:08

## 2018-12-12 RX ADMIN — Medication 400 MILLIGRAM(S): at 05:53

## 2018-12-12 NOTE — CHART NOTE - NSCHARTNOTEFT_GEN_A_CORE
Social Work Discharge Note:    Patient is cleared for discharge today by Attending.  Patient is alert and oriented x3.  Patient presents with cooperative behavior, reports feeling better, has clearer thought process and good insight/judgment, and is future-oriented.  Patient denies any SI, HI, or AV hallucinations, and reports no acute symptoms.  Patient has been compliant with treatment interventions and psychiatric medication, and will continue to follow up with outpatient behavioral health services.      Patient was provided with psychoeducation about diagnosis, current medications, course of treatment, and follow up appointments.  Clinical team discussed risks, benefits, alternatives discussed, all questions and concerns addressed and answered.  Clinical team reviewed crisis intervention, and patient verbalized understanding.  Patient will be returning to her residence in [place], where she lives with her 18 year old son, her adult son, his wife, and two grandchildren.     Patient is aware of discharge plan with continued treatment at [facility], with intake appointment scheduled for Tuesday December 18 at 1:45 pm, and agreeable to same.  She is also to attend her appointment with Dr. Cisse today at 10:00 am.     Patient is aware and agreeable to discharge today.

## 2018-12-14 DIAGNOSIS — F33.9 MAJOR DEPRESSIVE DISORDER, RECURRENT, UNSPECIFIED: ICD-10-CM

## 2018-12-14 DIAGNOSIS — R45.851 SUICIDAL IDEATIONS: ICD-10-CM

## 2018-12-14 DIAGNOSIS — Z90.49 ACQUIRED ABSENCE OF OTHER SPECIFIED PARTS OF DIGESTIVE TRACT: ICD-10-CM

## 2018-12-14 DIAGNOSIS — I10 ESSENTIAL (PRIMARY) HYPERTENSION: ICD-10-CM

## 2018-12-14 DIAGNOSIS — N39.0 URINARY TRACT INFECTION, SITE NOT SPECIFIED: ICD-10-CM

## 2018-12-14 DIAGNOSIS — F11.99 OPIOID USE, UNSPECIFIED WITH UNSPECIFIED OPIOID-INDUCED DISORDER: ICD-10-CM

## 2018-12-14 DIAGNOSIS — Z91.5 PERSONAL HISTORY OF SELF-HARM: ICD-10-CM

## 2018-12-14 DIAGNOSIS — F17.210 NICOTINE DEPENDENCE, CIGARETTES, UNCOMPLICATED: ICD-10-CM

## 2019-01-02 ENCOUNTER — OUTPATIENT (OUTPATIENT)
Dept: OUTPATIENT SERVICES | Facility: HOSPITAL | Age: 54
LOS: 1 days | Discharge: HOME | End: 2019-01-02

## 2019-01-02 DIAGNOSIS — Z98.891 HISTORY OF UTERINE SCAR FROM PREVIOUS SURGERY: Chronic | ICD-10-CM

## 2019-01-02 DIAGNOSIS — M06.9 RHEUMATOID ARTHRITIS, UNSPECIFIED: ICD-10-CM

## 2019-01-02 DIAGNOSIS — Z90.49 ACQUIRED ABSENCE OF OTHER SPECIFIED PARTS OF DIGESTIVE TRACT: Chronic | ICD-10-CM

## 2019-01-09 ENCOUNTER — OUTPATIENT (OUTPATIENT)
Dept: OUTPATIENT SERVICES | Facility: HOSPITAL | Age: 54
LOS: 1 days | Discharge: HOME | End: 2019-01-09

## 2019-01-09 DIAGNOSIS — Z98.891 HISTORY OF UTERINE SCAR FROM PREVIOUS SURGERY: Chronic | ICD-10-CM

## 2019-01-09 DIAGNOSIS — F11.20 OPIOID DEPENDENCE, UNCOMPLICATED: ICD-10-CM

## 2019-01-09 DIAGNOSIS — Z90.49 ACQUIRED ABSENCE OF OTHER SPECIFIED PARTS OF DIGESTIVE TRACT: Chronic | ICD-10-CM

## 2019-02-01 ENCOUNTER — OUTPATIENT (OUTPATIENT)
Dept: OUTPATIENT SERVICES | Facility: HOSPITAL | Age: 54
LOS: 1 days | End: 2019-02-01
Payer: MEDICAID

## 2019-02-01 DIAGNOSIS — Z90.49 ACQUIRED ABSENCE OF OTHER SPECIFIED PARTS OF DIGESTIVE TRACT: Chronic | ICD-10-CM

## 2019-02-01 DIAGNOSIS — Z98.891 HISTORY OF UTERINE SCAR FROM PREVIOUS SURGERY: Chronic | ICD-10-CM

## 2019-02-01 PROCEDURE — G9001: CPT

## 2019-02-20 ENCOUNTER — OUTPATIENT (OUTPATIENT)
Dept: OUTPATIENT SERVICES | Facility: HOSPITAL | Age: 54
LOS: 1 days | Discharge: HOME | End: 2019-02-20

## 2019-02-20 DIAGNOSIS — Z98.891 HISTORY OF UTERINE SCAR FROM PREVIOUS SURGERY: Chronic | ICD-10-CM

## 2019-02-20 DIAGNOSIS — Z90.49 ACQUIRED ABSENCE OF OTHER SPECIFIED PARTS OF DIGESTIVE TRACT: Chronic | ICD-10-CM

## 2019-02-20 DIAGNOSIS — F11.20 OPIOID DEPENDENCE, UNCOMPLICATED: ICD-10-CM

## 2019-02-26 DIAGNOSIS — Z71.89 OTHER SPECIFIED COUNSELING: ICD-10-CM

## 2019-03-27 ENCOUNTER — OUTPATIENT (OUTPATIENT)
Dept: OUTPATIENT SERVICES | Facility: HOSPITAL | Age: 54
LOS: 1 days | Discharge: HOME | End: 2019-03-27

## 2019-03-27 DIAGNOSIS — Z98.891 HISTORY OF UTERINE SCAR FROM PREVIOUS SURGERY: Chronic | ICD-10-CM

## 2019-03-27 DIAGNOSIS — F11.20 OPIOID DEPENDENCE, UNCOMPLICATED: ICD-10-CM

## 2019-03-27 DIAGNOSIS — Z90.49 ACQUIRED ABSENCE OF OTHER SPECIFIED PARTS OF DIGESTIVE TRACT: Chronic | ICD-10-CM

## 2019-05-01 ENCOUNTER — OUTPATIENT (OUTPATIENT)
Dept: OUTPATIENT SERVICES | Facility: HOSPITAL | Age: 54
LOS: 1 days | Discharge: HOME | End: 2019-05-01

## 2019-05-01 DIAGNOSIS — Z90.49 ACQUIRED ABSENCE OF OTHER SPECIFIED PARTS OF DIGESTIVE TRACT: Chronic | ICD-10-CM

## 2019-05-01 DIAGNOSIS — Z98.891 HISTORY OF UTERINE SCAR FROM PREVIOUS SURGERY: Chronic | ICD-10-CM

## 2019-05-02 DIAGNOSIS — F11.20 OPIOID DEPENDENCE, UNCOMPLICATED: ICD-10-CM

## 2019-06-05 ENCOUNTER — OUTPATIENT (OUTPATIENT)
Dept: OUTPATIENT SERVICES | Facility: HOSPITAL | Age: 54
LOS: 1 days | Discharge: HOME | End: 2019-06-05

## 2019-06-05 DIAGNOSIS — Z98.891 HISTORY OF UTERINE SCAR FROM PREVIOUS SURGERY: Chronic | ICD-10-CM

## 2019-06-05 DIAGNOSIS — F11.20 OPIOID DEPENDENCE, UNCOMPLICATED: ICD-10-CM

## 2019-06-05 DIAGNOSIS — Z90.49 ACQUIRED ABSENCE OF OTHER SPECIFIED PARTS OF DIGESTIVE TRACT: Chronic | ICD-10-CM

## 2019-07-25 ENCOUNTER — OUTPATIENT (OUTPATIENT)
Dept: OUTPATIENT SERVICES | Facility: HOSPITAL | Age: 54
LOS: 1 days | Discharge: HOME | End: 2019-07-25

## 2019-07-25 DIAGNOSIS — Z90.49 ACQUIRED ABSENCE OF OTHER SPECIFIED PARTS OF DIGESTIVE TRACT: Chronic | ICD-10-CM

## 2019-07-25 DIAGNOSIS — Z98.891 HISTORY OF UTERINE SCAR FROM PREVIOUS SURGERY: Chronic | ICD-10-CM

## 2019-08-07 ENCOUNTER — OUTPATIENT (OUTPATIENT)
Dept: OUTPATIENT SERVICES | Facility: HOSPITAL | Age: 54
LOS: 1 days | Discharge: HOME | End: 2019-08-07

## 2019-08-07 DIAGNOSIS — Z98.891 HISTORY OF UTERINE SCAR FROM PREVIOUS SURGERY: Chronic | ICD-10-CM

## 2019-08-07 DIAGNOSIS — Z90.49 ACQUIRED ABSENCE OF OTHER SPECIFIED PARTS OF DIGESTIVE TRACT: Chronic | ICD-10-CM

## 2019-08-08 DIAGNOSIS — F11.20 OPIOID DEPENDENCE, UNCOMPLICATED: ICD-10-CM

## 2019-09-04 ENCOUNTER — OUTPATIENT (OUTPATIENT)
Dept: OUTPATIENT SERVICES | Facility: HOSPITAL | Age: 54
LOS: 1 days | Discharge: HOME | End: 2019-09-04

## 2019-09-04 DIAGNOSIS — Z98.891 HISTORY OF UTERINE SCAR FROM PREVIOUS SURGERY: Chronic | ICD-10-CM

## 2019-09-04 DIAGNOSIS — Z90.49 ACQUIRED ABSENCE OF OTHER SPECIFIED PARTS OF DIGESTIVE TRACT: Chronic | ICD-10-CM

## 2019-09-05 DIAGNOSIS — F11.20 OPIOID DEPENDENCE, UNCOMPLICATED: ICD-10-CM

## 2019-10-09 ENCOUNTER — OUTPATIENT (OUTPATIENT)
Dept: OUTPATIENT SERVICES | Facility: HOSPITAL | Age: 54
LOS: 1 days | Discharge: HOME | End: 2019-10-09

## 2019-10-09 DIAGNOSIS — F11.20 OPIOID DEPENDENCE, UNCOMPLICATED: ICD-10-CM

## 2019-10-09 DIAGNOSIS — Z98.891 HISTORY OF UTERINE SCAR FROM PREVIOUS SURGERY: Chronic | ICD-10-CM

## 2019-10-09 DIAGNOSIS — Z90.49 ACQUIRED ABSENCE OF OTHER SPECIFIED PARTS OF DIGESTIVE TRACT: Chronic | ICD-10-CM

## 2019-11-06 ENCOUNTER — OUTPATIENT (OUTPATIENT)
Dept: OUTPATIENT SERVICES | Facility: HOSPITAL | Age: 54
LOS: 1 days | Discharge: HOME | End: 2019-11-06

## 2019-11-06 DIAGNOSIS — Z90.49 ACQUIRED ABSENCE OF OTHER SPECIFIED PARTS OF DIGESTIVE TRACT: Chronic | ICD-10-CM

## 2019-11-06 DIAGNOSIS — Z98.891 HISTORY OF UTERINE SCAR FROM PREVIOUS SURGERY: Chronic | ICD-10-CM

## 2019-11-07 DIAGNOSIS — F11.20 OPIOID DEPENDENCE, UNCOMPLICATED: ICD-10-CM

## 2019-12-05 ENCOUNTER — OUTPATIENT (OUTPATIENT)
Dept: OUTPATIENT SERVICES | Facility: HOSPITAL | Age: 54
LOS: 1 days | Discharge: HOME | End: 2019-12-05

## 2019-12-05 DIAGNOSIS — Z98.891 HISTORY OF UTERINE SCAR FROM PREVIOUS SURGERY: Chronic | ICD-10-CM

## 2019-12-05 DIAGNOSIS — Z90.49 ACQUIRED ABSENCE OF OTHER SPECIFIED PARTS OF DIGESTIVE TRACT: Chronic | ICD-10-CM

## 2019-12-06 DIAGNOSIS — F11.20 OPIOID DEPENDENCE, UNCOMPLICATED: ICD-10-CM

## 2019-12-18 ENCOUNTER — OUTPATIENT (OUTPATIENT)
Dept: OUTPATIENT SERVICES | Facility: HOSPITAL | Age: 54
LOS: 1 days | Discharge: HOME | End: 2019-12-18

## 2019-12-18 DIAGNOSIS — Z98.891 HISTORY OF UTERINE SCAR FROM PREVIOUS SURGERY: Chronic | ICD-10-CM

## 2019-12-18 DIAGNOSIS — Z90.49 ACQUIRED ABSENCE OF OTHER SPECIFIED PARTS OF DIGESTIVE TRACT: Chronic | ICD-10-CM

## 2019-12-19 DIAGNOSIS — F11.20 OPIOID DEPENDENCE, UNCOMPLICATED: ICD-10-CM

## 2020-08-22 ENCOUNTER — INPATIENT (INPATIENT)
Facility: HOSPITAL | Age: 55
LOS: 4 days | Discharge: HOME | End: 2020-08-27
Attending: INTERNAL MEDICINE | Admitting: INTERNAL MEDICINE
Payer: MEDICAID

## 2020-08-22 VITALS
SYSTOLIC BLOOD PRESSURE: 134 MMHG | OXYGEN SATURATION: 98 % | DIASTOLIC BLOOD PRESSURE: 82 MMHG | TEMPERATURE: 99 F | HEART RATE: 83 BPM | RESPIRATION RATE: 18 BRPM

## 2020-08-22 DIAGNOSIS — Z98.891 HISTORY OF UTERINE SCAR FROM PREVIOUS SURGERY: Chronic | ICD-10-CM

## 2020-08-22 DIAGNOSIS — Z88.8 ALLERGY STATUS TO OTHER DRUGS, MEDICAMENTS AND BIOLOGICAL SUBSTANCES: ICD-10-CM

## 2020-08-22 DIAGNOSIS — Z90.49 ACQUIRED ABSENCE OF OTHER SPECIFIED PARTS OF DIGESTIVE TRACT: Chronic | ICD-10-CM

## 2020-08-22 LAB
ALBUMIN SERPL ELPH-MCNC: 3.5 G/DL — SIGNIFICANT CHANGE UP (ref 3.5–5.2)
ALBUMIN SERPL ELPH-MCNC: 3.9 G/DL — SIGNIFICANT CHANGE UP (ref 3.5–5.2)
ALP SERPL-CCNC: 70 U/L — SIGNIFICANT CHANGE UP (ref 30–115)
ALP SERPL-CCNC: 71 U/L — SIGNIFICANT CHANGE UP (ref 30–115)
ALT FLD-CCNC: 81 U/L — HIGH (ref 0–41)
ALT FLD-CCNC: 88 U/L — HIGH (ref 0–41)
ANION GAP SERPL CALC-SCNC: 11 MMOL/L — SIGNIFICANT CHANGE UP (ref 7–14)
ANION GAP SERPL CALC-SCNC: 9 MMOL/L — SIGNIFICANT CHANGE UP (ref 7–14)
APPEARANCE UR: ABNORMAL
AST SERPL-CCNC: 102 U/L — HIGH (ref 0–41)
AST SERPL-CCNC: 88 U/L — HIGH (ref 0–41)
BACTERIA # UR AUTO: ABNORMAL
BASOPHILS # BLD AUTO: 0.04 K/UL — SIGNIFICANT CHANGE UP (ref 0–0.2)
BASOPHILS # BLD AUTO: 0.04 K/UL — SIGNIFICANT CHANGE UP (ref 0–0.2)
BASOPHILS NFR BLD AUTO: 0.6 % — SIGNIFICANT CHANGE UP (ref 0–1)
BASOPHILS NFR BLD AUTO: 0.6 % — SIGNIFICANT CHANGE UP (ref 0–1)
BILIRUB DIRECT SERPL-MCNC: <0.2 MG/DL — SIGNIFICANT CHANGE UP (ref 0–0.2)
BILIRUB INDIRECT FLD-MCNC: >0.3 MG/DL — SIGNIFICANT CHANGE UP (ref 0.2–1.2)
BILIRUB SERPL-MCNC: 0.5 MG/DL — SIGNIFICANT CHANGE UP (ref 0.2–1.2)
BILIRUB SERPL-MCNC: 0.5 MG/DL — SIGNIFICANT CHANGE UP (ref 0.2–1.2)
BILIRUB UR-MCNC: NEGATIVE — SIGNIFICANT CHANGE UP
BUN SERPL-MCNC: 13 MG/DL — SIGNIFICANT CHANGE UP (ref 10–20)
BUN SERPL-MCNC: 13 MG/DL — SIGNIFICANT CHANGE UP (ref 10–20)
CALCIUM SERPL-MCNC: 8.9 MG/DL — SIGNIFICANT CHANGE UP (ref 8.5–10.1)
CALCIUM SERPL-MCNC: 9.1 MG/DL — SIGNIFICANT CHANGE UP (ref 8.5–10.1)
CHLORIDE SERPL-SCNC: 100 MMOL/L — SIGNIFICANT CHANGE UP (ref 98–110)
CHLORIDE SERPL-SCNC: 99 MMOL/L — SIGNIFICANT CHANGE UP (ref 98–110)
CO2 SERPL-SCNC: 28 MMOL/L — SIGNIFICANT CHANGE UP (ref 17–32)
CO2 SERPL-SCNC: 28 MMOL/L — SIGNIFICANT CHANGE UP (ref 17–32)
COLOR SPEC: YELLOW — SIGNIFICANT CHANGE UP
CREAT SERPL-MCNC: 0.8 MG/DL — SIGNIFICANT CHANGE UP (ref 0.7–1.5)
CREAT SERPL-MCNC: 0.8 MG/DL — SIGNIFICANT CHANGE UP (ref 0.7–1.5)
DIFF PNL FLD: NEGATIVE — SIGNIFICANT CHANGE UP
EOSINOPHIL # BLD AUTO: 0.08 K/UL — SIGNIFICANT CHANGE UP (ref 0–0.7)
EOSINOPHIL # BLD AUTO: 0.1 K/UL — SIGNIFICANT CHANGE UP (ref 0–0.7)
EOSINOPHIL NFR BLD AUTO: 1.2 % — SIGNIFICANT CHANGE UP (ref 0–8)
EOSINOPHIL NFR BLD AUTO: 1.4 % — SIGNIFICANT CHANGE UP (ref 0–8)
EPI CELLS # UR: 1 /HPF — SIGNIFICANT CHANGE UP (ref 0–5)
GLUCOSE SERPL-MCNC: 110 MG/DL — HIGH (ref 70–99)
GLUCOSE SERPL-MCNC: 117 MG/DL — HIGH (ref 70–99)
GLUCOSE UR QL: NEGATIVE — SIGNIFICANT CHANGE UP
HCG SERPL QL: NEGATIVE — SIGNIFICANT CHANGE UP
HCT VFR BLD CALC: 37.1 % — SIGNIFICANT CHANGE UP (ref 37–47)
HCT VFR BLD CALC: 38.5 % — SIGNIFICANT CHANGE UP (ref 37–47)
HGB BLD-MCNC: 12.1 G/DL — SIGNIFICANT CHANGE UP (ref 12–16)
HGB BLD-MCNC: 12.8 G/DL — SIGNIFICANT CHANGE UP (ref 12–16)
HYALINE CASTS # UR AUTO: 5 /LPF — SIGNIFICANT CHANGE UP (ref 0–7)
IMM GRANULOCYTES NFR BLD AUTO: 0.1 % — SIGNIFICANT CHANGE UP (ref 0.1–0.3)
IMM GRANULOCYTES NFR BLD AUTO: 0.2 % — SIGNIFICANT CHANGE UP (ref 0.1–0.3)
KETONES UR-MCNC: NEGATIVE — SIGNIFICANT CHANGE UP
LACTATE SERPL-SCNC: 1.5 MMOL/L — SIGNIFICANT CHANGE UP (ref 0.7–2)
LEUKOCYTE ESTERASE UR-ACNC: ABNORMAL
LIDOCAIN IGE QN: 56 U/L — SIGNIFICANT CHANGE UP (ref 7–60)
LIDOCAIN IGE QN: 61 U/L — HIGH (ref 7–60)
LYMPHOCYTES # BLD AUTO: 4.03 K/UL — HIGH (ref 1.2–3.4)
LYMPHOCYTES # BLD AUTO: 4.32 K/UL — HIGH (ref 1.2–3.4)
LYMPHOCYTES # BLD AUTO: 59.9 % — HIGH (ref 20.5–51.1)
LYMPHOCYTES # BLD AUTO: 62.1 % — HIGH (ref 20.5–51.1)
MAGNESIUM SERPL-MCNC: 2 MG/DL — SIGNIFICANT CHANGE UP (ref 1.8–2.4)
MCHC RBC-ENTMCNC: 31.3 PG — HIGH (ref 27–31)
MCHC RBC-ENTMCNC: 31.4 PG — HIGH (ref 27–31)
MCHC RBC-ENTMCNC: 32.6 G/DL — SIGNIFICANT CHANGE UP (ref 32–37)
MCHC RBC-ENTMCNC: 33.2 G/DL — SIGNIFICANT CHANGE UP (ref 32–37)
MCV RBC AUTO: 94.1 FL — SIGNIFICANT CHANGE UP (ref 81–99)
MCV RBC AUTO: 96.4 FL — SIGNIFICANT CHANGE UP (ref 81–99)
MONOCYTES # BLD AUTO: 0.42 K/UL — SIGNIFICANT CHANGE UP (ref 0.1–0.6)
MONOCYTES # BLD AUTO: 0.43 K/UL — SIGNIFICANT CHANGE UP (ref 0.1–0.6)
MONOCYTES NFR BLD AUTO: 6 % — SIGNIFICANT CHANGE UP (ref 1.7–9.3)
MONOCYTES NFR BLD AUTO: 6.5 % — SIGNIFICANT CHANGE UP (ref 1.7–9.3)
NEUTROPHILS # BLD AUTO: 1.91 K/UL — SIGNIFICANT CHANGE UP (ref 1.4–6.5)
NEUTROPHILS # BLD AUTO: 2.31 K/UL — SIGNIFICANT CHANGE UP (ref 1.4–6.5)
NEUTROPHILS NFR BLD AUTO: 29.4 % — LOW (ref 42.2–75.2)
NEUTROPHILS NFR BLD AUTO: 32 % — LOW (ref 42.2–75.2)
NITRITE UR-MCNC: NEGATIVE — SIGNIFICANT CHANGE UP
NRBC # BLD: 0 /100 WBCS — SIGNIFICANT CHANGE UP (ref 0–0)
NRBC # BLD: 0 /100 WBCS — SIGNIFICANT CHANGE UP (ref 0–0)
PH UR: 7 — SIGNIFICANT CHANGE UP (ref 5–8)
PLATELET # BLD AUTO: 87 K/UL — LOW (ref 130–400)
PLATELET # BLD AUTO: 94 K/UL — LOW (ref 130–400)
POTASSIUM SERPL-MCNC: 3.9 MMOL/L — SIGNIFICANT CHANGE UP (ref 3.5–5)
POTASSIUM SERPL-MCNC: 5.2 MMOL/L — HIGH (ref 3.5–5)
POTASSIUM SERPL-SCNC: 3.9 MMOL/L — SIGNIFICANT CHANGE UP (ref 3.5–5)
POTASSIUM SERPL-SCNC: 5.2 MMOL/L — HIGH (ref 3.5–5)
PROT SERPL-MCNC: 8.2 G/DL — HIGH (ref 6–8)
PROT SERPL-MCNC: 9 G/DL — HIGH (ref 6–8)
PROT UR-MCNC: ABNORMAL
RBC # BLD: 3.85 M/UL — LOW (ref 4.2–5.4)
RBC # BLD: 4.09 M/UL — LOW (ref 4.2–5.4)
RBC # FLD: 14.2 % — SIGNIFICANT CHANGE UP (ref 11.5–14.5)
RBC # FLD: 14.3 % — SIGNIFICANT CHANGE UP (ref 11.5–14.5)
RBC CASTS # UR COMP ASSIST: 3 /HPF — SIGNIFICANT CHANGE UP (ref 0–4)
SODIUM SERPL-SCNC: 137 MMOL/L — SIGNIFICANT CHANGE UP (ref 135–146)
SODIUM SERPL-SCNC: 138 MMOL/L — SIGNIFICANT CHANGE UP (ref 135–146)
SP GR SPEC: 1.02 — SIGNIFICANT CHANGE UP (ref 1.01–1.02)
TROPONIN T SERPL-MCNC: <0.01 NG/ML — SIGNIFICANT CHANGE UP
UROBILINOGEN FLD QL: ABNORMAL
WBC # BLD: 6.49 K/UL — SIGNIFICANT CHANGE UP (ref 4.8–10.8)
WBC # BLD: 7.21 K/UL — SIGNIFICANT CHANGE UP (ref 4.8–10.8)
WBC # FLD AUTO: 6.49 K/UL — SIGNIFICANT CHANGE UP (ref 4.8–10.8)
WBC # FLD AUTO: 7.21 K/UL — SIGNIFICANT CHANGE UP (ref 4.8–10.8)
WBC UR QL: 175 /HPF — HIGH (ref 0–5)

## 2020-08-22 PROCEDURE — 93010 ELECTROCARDIOGRAM REPORT: CPT

## 2020-08-22 PROCEDURE — 74177 CT ABD & PELVIS W/CONTRAST: CPT | Mod: 26

## 2020-08-22 PROCEDURE — 70450 CT HEAD/BRAIN W/O DYE: CPT | Mod: 26

## 2020-08-22 PROCEDURE — 71045 X-RAY EXAM CHEST 1 VIEW: CPT | Mod: 26

## 2020-08-22 PROCEDURE — 99285 EMERGENCY DEPT VISIT HI MDM: CPT

## 2020-08-22 PROCEDURE — 76830 TRANSVAGINAL US NON-OB: CPT | Mod: 26

## 2020-08-22 RX ORDER — ACETAMINOPHEN 500 MG
650 TABLET ORAL ONCE
Refills: 0 | Status: COMPLETED | OUTPATIENT
Start: 2020-08-22 | End: 2020-08-22

## 2020-08-22 RX ORDER — CEFTRIAXONE 500 MG/1
1000 INJECTION, POWDER, FOR SOLUTION INTRAMUSCULAR; INTRAVENOUS ONCE
Refills: 0 | Status: COMPLETED | OUTPATIENT
Start: 2020-08-22 | End: 2020-08-22

## 2020-08-22 RX ORDER — SODIUM CHLORIDE 9 MG/ML
1000 INJECTION INTRAMUSCULAR; INTRAVENOUS; SUBCUTANEOUS ONCE
Refills: 0 | Status: COMPLETED | OUTPATIENT
Start: 2020-08-22 | End: 2020-08-22

## 2020-08-22 RX ADMIN — SODIUM CHLORIDE 1000 MILLILITER(S): 9 INJECTION INTRAMUSCULAR; INTRAVENOUS; SUBCUTANEOUS at 21:04

## 2020-08-22 RX ADMIN — Medication 650 MILLIGRAM(S): at 21:04

## 2020-08-22 RX ADMIN — CEFTRIAXONE 100 MILLIGRAM(S): 500 INJECTION, POWDER, FOR SOLUTION INTRAMUSCULAR; INTRAVENOUS at 22:05

## 2020-08-22 NOTE — ED PROVIDER NOTE - OBJECTIVE STATEMENT
The patient is a 55 year old female with a history of HTN, depression, anxiety, fibroids, on suboxone, presenting for abdominal pain. Pt states she has had lower abdominal pain x 1 month; R>L, cramping, intermittent, worsening associated with pink ~1tsp foul smelling vaginal discharge daily x 1 month and foul smelling urine. Pt states yesterday she passed out at work and had a fever of 102F and one episode of emesis. States she is not sexually active.

## 2020-08-22 NOTE — ED PROVIDER NOTE - CLINICAL SUMMARY MEDICAL DECISION MAKING FREE TEXT BOX
Patient had an episode of syncope while in radiology, patient was reevaluated and repeat vitals were WNL. Patient remained hemodynamically stable, results of the labs, imaging findings reviewed and discussed with patient, discussed with admitting physician and MAR, patient is admitted to Medicine for further evaluation and care.

## 2020-08-22 NOTE — ED PROVIDER NOTE - ATTENDING CONTRIBUTION TO CARE
Patient presents to ED for evaluation of syncope, fever, lower abdominal pain with vaginal bleeding, denies trauma.   Vitals noted  lungs: CTA, no crackles  abd: +BS, mild generalized tenderness, ND, soft  CNS: awake, alert, o x 3, no focal neurologic deficits  A/P: Syncope  abdominal pain  fever  labs, imaging, EKG  reevaluation

## 2020-08-22 NOTE — ED PROVIDER NOTE - CARE PLAN
Principal Discharge DX:	UTI (urinary tract infection) Principal Discharge DX:	Syncope  Secondary Diagnosis:	UTI (urinary tract infection)  Secondary Diagnosis:	Abdominal pain  Secondary Diagnosis:	Liver cirrhosis

## 2020-08-22 NOTE — ED ADULT NURSE NOTE - OBJECTIVE STATEMENT
pt. came to ED c'o pelvic pain with vaginal pain x 1 month. pt. alert and oriented times four. VSS. pt. afebrile. labs drawn and sent along with urine sample. pt. went to US had syncopal episode. EKG performed evaluated by MD. odonnell. . no acute distress noted. pt. placed on continuos cardiac and pulse ox monitoring. will monitor.

## 2020-08-22 NOTE — ED PROVIDER NOTE - PROGRESS NOTE DETAILS
TA: I was called by US tech who said patient appeared like she was going to pass out; went to US and examined patient; patient was speaking full sentences/responsive; strong pulse; stated she felt lightheaded;  PT has UTI; starting ceftriaxone.

## 2020-08-22 NOTE — ED PROVIDER NOTE - PHYSICAL EXAMINATION
CONSTITUTIONAL: in no acute distress. laying in stretcher speaking full sentences.   SKIN: warm, dry  HEAD: Normocephalic; atraumatic.  EYES: PERRL, EOMI, normal sclera and conjunctiva   ENT: No nasal discharge; airway clear.  NECK: Supple; non tender.  CARD: S1, S2 normal; no murmurs, gallops, or rubs. Regular rate and rhythm.   RESP: No wheezes, rales or rhonchi.  ABD: soft +RLQ ttp +LLQ ttp   EXT: Normal ROM.  No clubbing, cyanosis or edema.   LYMPH: No acute cervical adenopathy.  NEURO: Alert, oriented, grossly unremarkable  PSYCH: Cooperative, appropriate.

## 2020-08-22 NOTE — ED PROVIDER NOTE - NS ED ROS FT
Eyes:  No visual changes, eye pain or discharge.  ENMT:  No hearing changes, pain, discharge or infections. No neck pain or stiffness.  Cardiac:  No chest pain, SOB or edema. No chest pain with exertion.  Respiratory:  No cough or respiratory distress. No hemoptysis.   GI:  +vomiting, abdominal pain.  :  No dysuria, frequency or burning. +foul smelling vaginal d/c.   MS:  No myalgia, muscle weakness, joint pain.   Neuro:  No headache or weakness. + LOC.   Skin:  No skin rash.   Endocrine: No history of thyroid disease or diabetes.

## 2020-08-23 LAB
ALBUMIN SERPL ELPH-MCNC: 3.4 G/DL — LOW (ref 3.5–5.2)
ALP SERPL-CCNC: 71 U/L — SIGNIFICANT CHANGE UP (ref 30–115)
ALT FLD-CCNC: 75 U/L — HIGH (ref 0–41)
ANION GAP SERPL CALC-SCNC: 10 MMOL/L — SIGNIFICANT CHANGE UP (ref 7–14)
APTT BLD: 37.9 SEC — SIGNIFICANT CHANGE UP (ref 27–39.2)
AST SERPL-CCNC: 77 U/L — HIGH (ref 0–41)
BASOPHILS # BLD AUTO: 0.03 K/UL — SIGNIFICANT CHANGE UP (ref 0–0.2)
BASOPHILS NFR BLD AUTO: 0.5 % — SIGNIFICANT CHANGE UP (ref 0–1)
BILIRUB DIRECT SERPL-MCNC: <0.2 MG/DL — SIGNIFICANT CHANGE UP (ref 0–0.2)
BILIRUB INDIRECT FLD-MCNC: >0.2 MG/DL — SIGNIFICANT CHANGE UP (ref 0.2–1.2)
BILIRUB SERPL-MCNC: 0.4 MG/DL — SIGNIFICANT CHANGE UP (ref 0.2–1.2)
BLD GP AB SCN SERPL QL: SIGNIFICANT CHANGE UP
BUN SERPL-MCNC: 12 MG/DL — SIGNIFICANT CHANGE UP (ref 10–20)
CALCIUM SERPL-MCNC: 8.6 MG/DL — SIGNIFICANT CHANGE UP (ref 8.5–10.1)
CHLORIDE SERPL-SCNC: 102 MMOL/L — SIGNIFICANT CHANGE UP (ref 98–110)
CO2 SERPL-SCNC: 29 MMOL/L — SIGNIFICANT CHANGE UP (ref 17–32)
CREAT SERPL-MCNC: 0.6 MG/DL — LOW (ref 0.7–1.5)
EOSINOPHIL # BLD AUTO: 0.09 K/UL — SIGNIFICANT CHANGE UP (ref 0–0.7)
EOSINOPHIL NFR BLD AUTO: 1.5 % — SIGNIFICANT CHANGE UP (ref 0–8)
GLUCOSE SERPL-MCNC: 104 MG/DL — HIGH (ref 70–99)
HCT VFR BLD CALC: 36.6 % — LOW (ref 37–47)
HGB BLD-MCNC: 11.9 G/DL — LOW (ref 12–16)
IMM GRANULOCYTES NFR BLD AUTO: 0.2 % — SIGNIFICANT CHANGE UP (ref 0.1–0.3)
INR BLD: 1.37 RATIO — HIGH (ref 0.65–1.3)
IRON SATN MFR SERPL: 28 % — SIGNIFICANT CHANGE UP (ref 15–50)
IRON SATN MFR SERPL: 81 UG/DL — SIGNIFICANT CHANGE UP (ref 35–150)
LACTATE SERPL-SCNC: 1.8 MMOL/L — SIGNIFICANT CHANGE UP (ref 0.7–2)
LYMPHOCYTES # BLD AUTO: 3.83 K/UL — HIGH (ref 1.2–3.4)
LYMPHOCYTES # BLD AUTO: 64 % — HIGH (ref 20.5–51.1)
MCHC RBC-ENTMCNC: 31.3 PG — HIGH (ref 27–31)
MCHC RBC-ENTMCNC: 32.5 G/DL — SIGNIFICANT CHANGE UP (ref 32–37)
MCV RBC AUTO: 96.3 FL — SIGNIFICANT CHANGE UP (ref 81–99)
MONOCYTES # BLD AUTO: 0.44 K/UL — SIGNIFICANT CHANGE UP (ref 0.1–0.6)
MONOCYTES NFR BLD AUTO: 7.4 % — SIGNIFICANT CHANGE UP (ref 1.7–9.3)
NEUTROPHILS # BLD AUTO: 1.58 K/UL — SIGNIFICANT CHANGE UP (ref 1.4–6.5)
NEUTROPHILS NFR BLD AUTO: 26.4 % — LOW (ref 42.2–75.2)
NRBC # BLD: 0 /100 WBCS — SIGNIFICANT CHANGE UP (ref 0–0)
PLATELET # BLD AUTO: 74 K/UL — LOW (ref 130–400)
POTASSIUM SERPL-MCNC: 4.1 MMOL/L — SIGNIFICANT CHANGE UP (ref 3.5–5)
POTASSIUM SERPL-SCNC: 4.1 MMOL/L — SIGNIFICANT CHANGE UP (ref 3.5–5)
PROT SERPL-MCNC: 7.8 G/DL — SIGNIFICANT CHANGE UP (ref 6–8)
PROTHROM AB SERPL-ACNC: 15.8 SEC — HIGH (ref 9.95–12.87)
RBC # BLD: 3.8 M/UL — LOW (ref 4.2–5.4)
RBC # FLD: 14.5 % — SIGNIFICANT CHANGE UP (ref 11.5–14.5)
SARS-COV-2 RNA SPEC QL NAA+PROBE: SIGNIFICANT CHANGE UP
SODIUM SERPL-SCNC: 141 MMOL/L — SIGNIFICANT CHANGE UP (ref 135–146)
TIBC SERPL-MCNC: 286 UG/DL — SIGNIFICANT CHANGE UP (ref 220–430)
TROPONIN T SERPL-MCNC: <0.01 NG/ML — SIGNIFICANT CHANGE UP
UIBC SERPL-MCNC: 205 UG/DL — SIGNIFICANT CHANGE UP (ref 110–370)
WBC # BLD: 5.98 K/UL — SIGNIFICANT CHANGE UP (ref 4.8–10.8)
WBC # FLD AUTO: 5.98 K/UL — SIGNIFICANT CHANGE UP (ref 4.8–10.8)

## 2020-08-23 PROCEDURE — 76705 ECHO EXAM OF ABDOMEN: CPT | Mod: 26

## 2020-08-23 PROCEDURE — 99223 1ST HOSP IP/OBS HIGH 75: CPT

## 2020-08-23 RX ORDER — LACTULOSE 10 G/15ML
30 SOLUTION ORAL EVERY 8 HOURS
Refills: 0 | Status: DISCONTINUED | OUTPATIENT
Start: 2020-08-23 | End: 2020-08-27

## 2020-08-23 RX ORDER — CEFTRIAXONE 500 MG/1
1000 INJECTION, POWDER, FOR SOLUTION INTRAMUSCULAR; INTRAVENOUS EVERY 24 HOURS
Refills: 0 | Status: DISCONTINUED | OUTPATIENT
Start: 2020-08-23 | End: 2020-08-27

## 2020-08-23 RX ORDER — METOPROLOL TARTRATE 50 MG
100 TABLET ORAL
Refills: 0 | Status: DISCONTINUED | OUTPATIENT
Start: 2020-08-23 | End: 2020-08-27

## 2020-08-23 RX ORDER — HYDROCHLOROTHIAZIDE 25 MG
12.5 TABLET ORAL DAILY
Refills: 0 | Status: DISCONTINUED | OUTPATIENT
Start: 2020-08-23 | End: 2020-08-27

## 2020-08-23 RX ORDER — QUETIAPINE FUMARATE 200 MG/1
100 TABLET, FILM COATED ORAL
Refills: 0 | Status: DISCONTINUED | OUTPATIENT
Start: 2020-08-23 | End: 2020-08-27

## 2020-08-23 RX ORDER — SENNA PLUS 8.6 MG/1
2 TABLET ORAL AT BEDTIME
Refills: 0 | Status: DISCONTINUED | OUTPATIENT
Start: 2020-08-23 | End: 2020-08-27

## 2020-08-23 RX ORDER — LOSARTAN POTASSIUM 100 MG/1
100 TABLET, FILM COATED ORAL DAILY
Refills: 0 | Status: DISCONTINUED | OUTPATIENT
Start: 2020-08-23 | End: 2020-08-27

## 2020-08-23 RX ORDER — POLYETHYLENE GLYCOL 3350 17 G/17G
17 POWDER, FOR SOLUTION ORAL DAILY
Refills: 0 | Status: DISCONTINUED | OUTPATIENT
Start: 2020-08-23 | End: 2020-08-24

## 2020-08-23 RX ORDER — ENOXAPARIN SODIUM 100 MG/ML
40 INJECTION SUBCUTANEOUS AT BEDTIME
Refills: 0 | Status: DISCONTINUED | OUTPATIENT
Start: 2020-08-23 | End: 2020-08-27

## 2020-08-23 RX ORDER — ESCITALOPRAM OXALATE 10 MG/1
10 TABLET, FILM COATED ORAL DAILY
Refills: 0 | Status: DISCONTINUED | OUTPATIENT
Start: 2020-08-23 | End: 2020-08-27

## 2020-08-23 RX ORDER — PANTOPRAZOLE SODIUM 20 MG/1
40 TABLET, DELAYED RELEASE ORAL
Refills: 0 | Status: DISCONTINUED | OUTPATIENT
Start: 2020-08-23 | End: 2020-08-27

## 2020-08-23 RX ORDER — AMLODIPINE BESYLATE 2.5 MG/1
1 TABLET ORAL
Qty: 0 | Refills: 0 | DISCHARGE

## 2020-08-23 RX ORDER — GABAPENTIN 400 MG/1
100 CAPSULE ORAL THREE TIMES A DAY
Refills: 0 | Status: DISCONTINUED | OUTPATIENT
Start: 2020-08-23 | End: 2020-08-27

## 2020-08-23 RX ORDER — BUPRENORPHINE AND NALOXONE 2; .5 MG/1; MG/1
1 TABLET SUBLINGUAL DAILY
Refills: 0 | Status: DISCONTINUED | OUTPATIENT
Start: 2020-08-23 | End: 2020-08-27

## 2020-08-23 RX ORDER — METRONIDAZOLE 500 MG
500 TABLET ORAL EVERY 12 HOURS
Refills: 0 | Status: DISCONTINUED | OUTPATIENT
Start: 2020-08-23 | End: 2020-08-25

## 2020-08-23 RX ADMIN — Medication 100 MILLIGRAM(S): at 19:04

## 2020-08-23 RX ADMIN — BUPRENORPHINE AND NALOXONE 1 TABLET(S): 2; .5 TABLET SUBLINGUAL at 12:19

## 2020-08-23 RX ADMIN — LACTULOSE 30 GRAM(S): 10 SOLUTION ORAL at 16:49

## 2020-08-23 RX ADMIN — ESCITALOPRAM OXALATE 10 MILLIGRAM(S): 10 TABLET, FILM COATED ORAL at 12:19

## 2020-08-23 RX ADMIN — Medication 12.5 MILLIGRAM(S): at 05:42

## 2020-08-23 RX ADMIN — ENOXAPARIN SODIUM 40 MILLIGRAM(S): 100 INJECTION SUBCUTANEOUS at 22:17

## 2020-08-23 RX ADMIN — LACTULOSE 30 GRAM(S): 10 SOLUTION ORAL at 05:42

## 2020-08-23 RX ADMIN — QUETIAPINE FUMARATE 100 MILLIGRAM(S): 200 TABLET, FILM COATED ORAL at 05:42

## 2020-08-23 RX ADMIN — Medication 100 MILLIGRAM(S): at 05:42

## 2020-08-23 RX ADMIN — POLYETHYLENE GLYCOL 3350 17 GRAM(S): 17 POWDER, FOR SOLUTION ORAL at 12:20

## 2020-08-23 RX ADMIN — GABAPENTIN 100 MILLIGRAM(S): 400 CAPSULE ORAL at 05:42

## 2020-08-23 RX ADMIN — LOSARTAN POTASSIUM 100 MILLIGRAM(S): 100 TABLET, FILM COATED ORAL at 05:42

## 2020-08-23 RX ADMIN — Medication 100 MILLIGRAM(S): at 19:00

## 2020-08-23 RX ADMIN — QUETIAPINE FUMARATE 100 MILLIGRAM(S): 200 TABLET, FILM COATED ORAL at 19:40

## 2020-08-23 RX ADMIN — Medication 100 MILLIGRAM(S): at 07:02

## 2020-08-23 RX ADMIN — GABAPENTIN 100 MILLIGRAM(S): 400 CAPSULE ORAL at 16:49

## 2020-08-23 RX ADMIN — GABAPENTIN 100 MILLIGRAM(S): 400 CAPSULE ORAL at 22:06

## 2020-08-23 NOTE — H&P ADULT - NSHPPHYSICALEXAM_GEN_ALL_CORE
CONSTITUTIONAL: No acute distress, well-developed, well-groomed, AAOx3  HEAD: Atraumatic, normocephalic  EYES: EOM intact, PERRLA, conjunctiva and sclera clear  ENT: Supple, no masses, no thyromegaly, no bruits, no JVD; moist mucous membranes  PULMONARY: Clear to auscultation bilaterally; no wheezes, rales, or rhonchi  CARDIOVASCULAR: Regular rate and rhythm; no murmurs, rubs, or gallops  GASTROINTESTINAL: Soft, non-tender, distended; lower abd tenderness R>L, bowel sounds present  MUSCULOSKELETAL: 2+ peripheral pulses; no clubbing, no cyanosis, no edema  NEUROLOGY: non-focal  SKIN: No rashes or lesions; warm and dry CONSTITUTIONAL: No acute distress, well-developed, well-groomed, AAOx3  HEAD: Atraumatic, normocephalic  EYES: EOM intact, PERRLA, conjunctiva and sclera clear  ENT: Supple, no masses, no thyromegaly, no bruits, no JVD; moist mucous membranes  PULMONARY: Clear to auscultation bilaterally; no wheezes, rales, or rhonchi  CARDIOVASCULAR: Regular rate and rhythm; no murmurs, rubs, or gallops  GASTROINTESTINAL: Soft, distended; lower abd tenderness R>L, bowel sounds present  MUSCULOSKELETAL: 2+ peripheral pulses; no clubbing, no cyanosis, no edema  NEUROLOGY: non-focal  SKIN: No rashes or lesions; warm and dry

## 2020-08-23 NOTE — CHART NOTE - NSCHARTNOTEFT_GEN_A_CORE
pt seen and examined at bedside. continues to endorse lower abdominal pain and generalized fatigue. She states her vaginal discharge was pink before but now she has noted some bleeding. currently hemodynamically stable    #abdominal pain likely 2/2 large uterine fibroid vs. constipation   -GYN to see pt today   -cont present pain mgmt  -cont bowel regimen     #syncope orthostatic vs. cardiogenic   -f/u ECHO  -cont IVF hydration     #suspected UTI/? trichomonas infection  -cont ceftriaxone and flagyl     #liver cirrhosis with portal HTN  -GI workup sent  -f/u final gi recs     Progress Note Handoff  Pending:  gyn, gi, echo  Patient/Family discussion: POC discussed with pt no questions at this time  Disposition: home

## 2020-08-23 NOTE — H&P ADULT - HISTORY OF PRESENT ILLNESS
53 yr F with HTN, anxiety and uterine fibroids, Hx of drugs abuse, depression and frequents [facility] was bought in for evaluation of syncope and abd pain.    pt reports having lower abd pain since 3 months, that has been worsening for the past 3 days, associated with pinky and foul smelling vaginal discharge,  pt had fever yesterday 102F, and had episode of passing out today at work while she was speaking to her coworker, she felt on the ground and had LOC for 1-2 min,   pt also reported weight gain of 30 Ibs since few months and increase abd distension, reports having Hx of chronic constipation, 1 BM per week.    she denies headache, chest pain, SOB, diarrhea, urinary frequency or dysuria, melena, bloody emesis, Hx of c-scope, recent travel or sick contact.  pt is not sexually active.   In ED; VS wnl, CTH negative, labs showed elevated AST/ALT 80s, UA +ve for bebo esterase and bacteruria, CT constipation, liver cirrhosis with portal HTN and US showed uterine fibroid Pt is poor historian,  53 yr F with HTN, anxiety and uterine fibroids, Hx of drugs abuse, previous Hep C ? depression and frequents [facility] was bought in for evaluation of syncope and abd pain.    pt reports having lower abd pain since 3 months, that has been worsening for the past 3 days, associated with pinky and foul smelling vaginal discharge,  pt had fever yesterday 102F, and had episode of passing out today at work while she was speaking to her coworker, she felt on the ground and had LOC for 1-2 min,   pt also reported weight gain of 30 Ibs since few months and increase abd distension, reports having Hx of chronic constipation, 1 BM per week.    she denies headache, chest pain, SOB, diarrhea, urinary frequency or dysuria, melena, bloody emesis, Hx of c-scope, recent travel or sick contact.  pt is not sexually active.   In ED; VS wnl, CTH negative, labs showed elevated AST/ALT 80s, UA +ve for bebo esterase and bacteruria, CT constipation, liver cirrhosis with portal HTN and US showed uterine fibroid Pt is poor historian,  53 yr F with HTN, anxiety and uterine fibroids, Hx of drugs abuse, previous Hep C ? depression and frequents [facility] was bought in for evaluation of syncope and abd pain.    pt reports having lower abd pain since 3 months, that has been worsening for the past 3 days, associated with pinky and foul smelling vaginal discharge,  pt had fever yesterday 102F, and had episode of passing out today at work while she was speaking to her coworker, she felt on the ground and had LOC for 1-2 min,   pt also reported weight gain of 30 Ibs since few months and increase abd distension, reports having Hx of chronic constipation, 1 BM per week.    she denies headache, chest pain, SOB, diarrhea, urinary frequency or dysuria, melena, bloody emesis, Hx of c-scope, recent travel or sick contact.  pt is not sexually active   In ED; VS wnl, CTH negative, labs showed elevated AST/ALT 80s, UA +ve for bebo esterase and bacteruria, CT constipation, liver cirrhosis with portal HTN and US showed uterine fibroid

## 2020-08-23 NOTE — CONSULT NOTE ADULT - SUBJECTIVE AND OBJECTIVE BOX
Gastroenterology Consultation:    Patient is a 55y old  Female who presents with a chief complaint of syncope (23 Aug 2020 01:50)      Admitted on: 20  HPI:  Pt is poor historian,  53 yr F with HTN, anxiety and uterine fibroids, Hx of drugs abuse, previous Hep C ? depression and frequents [facility] was bought in for evaluation of syncope and abd pain.    pt reports having lower abd pain since 3 months, that has been worsening for the past 3 days, associated with pinky and foul smelling vaginal discharge,  pt had fever yesterday 102F, and had episode of passing out today at work while she was speaking to her coworker, she felt on the ground and had LOC for 1-2 min,   pt also reported weight gain of 30 Ibs since few months and increase abd distension, reports having Hx of chronic constipation, 1 BM per week.    she denies headache, chest pain, SOB, diarrhea, urinary frequency or dysuria, melena, bloody emesis, Hx of c-scope, recent travel or sick contact.  pt is not sexually active   In ED; VS wnl, CTH negative, labs showed elevated AST/ALT 80s, UA +ve for bebo esterase and bacteruria, CT constipation, liver cirrhosis with portal HTN and US showed uterine fibroid (23 Aug 2020 01:50)        Prior EGD:  Prior Colonoscopy:      PAST MEDICAL & SURGICAL HISTORY:  Hypertension  Panic attacks  Depression  Anxiety  H/O:   History of cholecystectomy      FAMILY HISTORY:  No pertinent family history in first degree relatives      Social History:  Tobacco: negative   Alcohol: negative   Drugs: negative     Home Medications:  buprenorphine-naloxone 8 mg-2 mg sublingual tablet: 1 tab sublingual 2 times a day (11 Dec 2018 15:40)  gabapentin 100 mg oral capsule: 1 cap(s) orally 3 times a day (11 Dec 2018 15:40)  hydroCHLOROthiazide 12.5 mg oral capsule: 1 cap(s) orally once a day (19 Oct 2018 16:48)  losartan 100 mg oral tablet: 1 tab(s) orally once a day (23 Aug 2020 02:05)  Metoprolol Tartrate 100 mg oral tablet: 1 tab(s) orally 2 times a day (19 Oct 2018 16:48)  omeprazole 20 mg oral delayed release capsule: 1 cap(s) orally once a day (19 Oct 2018 16:48)  SEROquel 100 mg oral tablet: 1 tab(s) orally 2 times a day (23 Aug 2020 02:07)  simvastatin 40 mg oral tablet: 1 tab(s) orally once a day (at bedtime) (23 Aug 2020 02:05)    MEDICATIONS  (STANDING):  buprenorphine 8 mG/naloxone 2 mG SL  Tablet 1 Tablet(s) SubLingual daily  cefTRIAXone   IVPB 1000 milliGRAM(s) IV Intermittent every 24 hours  enoxaparin Injectable 40 milliGRAM(s) SubCutaneous at bedtime  escitalopram 10 milliGRAM(s) Oral daily  gabapentin 100 milliGRAM(s) Oral three times a day  hydrochlorothiazide 12.5 milliGRAM(s) Oral daily  lactulose Syrup 30 Gram(s) Oral every 8 hours  losartan 100 milliGRAM(s) Oral daily  metoprolol tartrate 100 milliGRAM(s) Oral two times a day  metroNIDAZOLE  IVPB 500 milliGRAM(s) IV Intermittent every 12 hours  pantoprazole    Tablet 40 milliGRAM(s) Oral before breakfast  polyethylene glycol 3350 17 Gram(s) Oral daily  QUEtiapine Oral Tab/Cap - Peds 100 milliGRAM(s) Oral two times a day  senna 2 Tablet(s) Oral at bedtime    MEDICATIONS  (PRN):      Allergies  Benadryl (Unknown)      Review of Systems:   Constitutional:  No Fever, No Chills  ENT/Mouth:  No Hearing Changes,  No Difficulty Swallowing  Eyes:  No Eye Pain, No Vision Changes  Cardiovascular:  No Chest Pain, No Palpitations  Respiratory:  No Cough, No Dyspnea  Gastrointestinal:  As described in HPI  Musculoskeletal:  No Joint Swelling, No Back Pain  Skin:  No Skin Lesions, No Jaundice  Neuro:  No Syncope, No Dizziness  Heme/Lymph:  No Bruising, No Bleeding.          Physical Examination:  T(C): 36 (20 @ 00:44), Max: 37.2 (20 @ 19:19)  HR: 68 (20 @ 05:40) (68 - 83)  BP: 142/71 (20 @ 05:40) (116/60 - 152/72)  RR: 18 (20 @ 00:44) (18 - 20)  SpO2: 99% (20 @ 00:44) (98% - 99%)        Constitutional: No acute distress.  Eyes:. Conjunctivae are clear, Sclera is non-icteric.  Ears Nose and Throat: The external ears are normal appearing,  Oral mucosa is pink and moist.  Respiratory:  No signs of respiratory distress. Lung sounds are clear bilaterally.  Cardiovascular:  S1 S2, Regular rate and rhythm.  GI: Abdomen is soft, symmetric, and non-tender without distention.  Bowel sounds are present and normoactive in all four quadrants. No masses, hepatomegaly, or splenomegaly are noted.   Neuro: No Tremor, No involuntary movements  Skin: No rashes, No Jaundice.          Data: (reviewed by attending)                        12.1   6.49  )-----------( 87       ( 22 Aug 2020 21:35 )             37.1     Hgb Trend:  12.1  20 @ 21:35  12.8  20 @ 20:11          138  |  99  |  13  ----------------------------<  110<H>  3.9   |  28  |  0.8    Ca    8.9      22 Aug 2020 21:35  Mg     2.0         TPro  8.2<H>  /  Alb  3.5  /  TBili  0.5  /  DBili  x   /  AST  88<H>  /  ALT  81<H>  /  AlkPhos  71      Liver panel trend:  TBili 0.5   /   AST 88   /   ALT 81   /   AlkP 71   /   Tptn 8.2   /   Alb 3.5    /   DBili --        TBili 0.5   /      /   ALT 88   /   AlkP 70   /   Tptn 9.0   /   Alb 3.9    /   DBili <0.2                    Radiology:(reviewed by attending)  CT Abdomen and Pelvis w/ IV Cont:   EXAM:  CT ABDOMEN AND PELVIS IC            PROCEDURE DATE:  2020            INTERPRETATION:  CLINICAL HISTORY: Abdominal pain.    TECHNIQUE: Contiguous axial CT images were obtained from the lower chest to the pubic symphysis following administration of Optiray intravenous contrast. Oral contrast was not administered. Reformatted images in the coronal and sagittal planes were acquired.    COMPARISON: CT abdomen and pelvis dated 2010.      FINDINGS:    LOWER CHEST: Bilateral dependentatelectasis    HEPATOBILIARY: Nodular liver contour. Cholecystectomy.    SPLEEN: Splenomegaly, new since     PANCREAS: Unremarkable.    ADRENAL GLANDS: Unremarkable.    KIDNEYS: No hydronephrosis bilaterally. Symmetric nephrograms. Punctate nonobstructing calculus in the left upper renal pole. Bilateral small cysts and too small to further characterize hypodensities.    ABDOMINOPELVIC NODES: Prominent portacaval lymph nodes are noted.    PELVIC ORGANS: Unremarkable.    PERITONEUM/MESENTERY/BOWEL: Large colonic stool burden fecalization of contents within the distal small bowel. Normal appendix. No pneumoperitoneum or abdominopelvic free fluid.    BONES/SOFT TISSUES: No acute osseous abnormality. Degenerative changes of the lumbar spine.    VASCULAR: No evidence of portal vein thrombosis. Splenic vein is suboptimally opacified. Normal caliber aorta.      IMPRESSION:      Large colonic stool burden and fecalization of the distal small bowel which may reflect a clinical diagnosis of constipation or functional bowel disease.    Cirrhotic changes with evidence of portal hypertension. No evidence of portal vein thrombosis.    Nonspecific enlarged portacaval nodes are likely reactive.            MELO CAMPOS M.D., RESIDENT RADIOLOGIST  This document has been electronically signed.  MAYO PAULINO M.D., ATTENDING RADIOLOGIST  This document has been electronically signed. Aug 23 2020 12:07AM             (20 @ 23:20) Gastroenterology Consultation:    Patient is a 55y old  Female who presents with a chief complaint of syncope (23 Aug 2020 01:50)      Admitted on: 20  HPI:  Pt is poor historian,  53 yr F with HTN, anxiety and uterine fibroids, Hx of drugs abuse, previous Hep C ?  ( was told that she has hepatitis C by her PMD , but did not receive treatment , depression and frequents [facility] was bought in for evaluation of syncope and abd pain.    pt reports having lower abd pain since 3 months, that has been worsening for the past 3 days, associated with pinky and foul smelling vaginal discharge,  pt had fever yesterday 102F, and had episode of passing out today at work while she was speaking to her coworker, she felt on the ground and had LOC for 1-2 min,   pt also reported weight gain of 30 Ibs since few months and increase abd distension, reports having Hx of chronic constipation, 1 BM per week.    she denies headache, chest pain, SOB, diarrhea, urinary frequency or dysuria, melena, bloody emesis, hematochezia ,  recent travel or sick contact.  pt is not sexually active  In ED; VS wnl, CTH negative, labs showed elevated AST/ALT 80s, UA +ve for bebo esterase and bacteruria, CT constipation, liver cirrhosis with portal HTN and US showed uterine fibroid (23 Aug 2020 01:50)        Prior EGD: denies   Prior Colonoscopy: 2 years ago , not in records , does not know the result       PAST MEDICAL & SURGICAL HISTORY:  Hypertension  Panic attacks  Depression  Anxiety  H/O:   History of cholecystectomy      FAMILY HISTORY:  No pertinent family history in first degree relatives      Social History:  Tobacco: negative   Alcohol: negative   Drugs: negative     Home Medications:  buprenorphine-naloxone 8 mg-2 mg sublingual tablet: 1 tab sublingual 2 times a day (11 Dec 2018 15:40)  gabapentin 100 mg oral capsule: 1 cap(s) orally 3 times a day (11 Dec 2018 15:40)  hydroCHLOROthiazide 12.5 mg oral capsule: 1 cap(s) orally once a day (19 Oct 2018 16:48)  losartan 100 mg oral tablet: 1 tab(s) orally once a day (23 Aug 2020 02:05)  Metoprolol Tartrate 100 mg oral tablet: 1 tab(s) orally 2 times a day (19 Oct 2018 16:48)  omeprazole 20 mg oral delayed release capsule: 1 cap(s) orally once a day (19 Oct 2018 16:48)  SEROquel 100 mg oral tablet: 1 tab(s) orally 2 times a day (23 Aug 2020 02:07)  simvastatin 40 mg oral tablet: 1 tab(s) orally once a day (at bedtime) (23 Aug 2020 02:05)    MEDICATIONS  (STANDING):  buprenorphine 8 mG/naloxone 2 mG SL  Tablet 1 Tablet(s) SubLingual daily  cefTRIAXone   IVPB 1000 milliGRAM(s) IV Intermittent every 24 hours  enoxaparin Injectable 40 milliGRAM(s) SubCutaneous at bedtime  escitalopram 10 milliGRAM(s) Oral daily  gabapentin 100 milliGRAM(s) Oral three times a day  hydrochlorothiazide 12.5 milliGRAM(s) Oral daily  lactulose Syrup 30 Gram(s) Oral every 8 hours  losartan 100 milliGRAM(s) Oral daily  metoprolol tartrate 100 milliGRAM(s) Oral two times a day  metroNIDAZOLE  IVPB 500 milliGRAM(s) IV Intermittent every 12 hours  pantoprazole    Tablet 40 milliGRAM(s) Oral before breakfast  polyethylene glycol 3350 17 Gram(s) Oral daily  QUEtiapine Oral Tab/Cap - Peds 100 milliGRAM(s) Oral two times a day  senna 2 Tablet(s) Oral at bedtime    MEDICATIONS  (PRN):      Allergies  Benadryl (Unknown)      Review of Systems:   Constitutional:  No Fever now , No Chills  ENT/Mouth:  No Hearing Changes,  No Difficulty Swallowing  Eyes:  No Eye Pain, No Vision Changes  Cardiovascular:  No Chest Pain, No Palpitations  Respiratory:  No Cough, No Dyspnea  Gastrointestinal:  As described in HPI  Musculoskeletal:  No Joint Swelling, No Back Pain  Skin:  No Skin Lesions, No Jaundice  Neuro:  No Syncope, No Dizziness  Heme/Lymph:  No Bruising, No Bleeding.          Physical Examination:  T(C): 36 (20 @ 00:44), Max: 37.2 (20 @ 19:19)  HR: 68 (20 @ 05:40) (68 - 83)  BP: 142/71 (20 @ 05:40) (116/60 - 152/72)  RR: 18 (20 @ 00:44) (18 - 20)  SpO2: 99% (20 @ 00:44) (98% - 99%)        Constitutional: No acute distress.  Eyes:. Conjunctivae are clear, Sclera is non-icteric.  Ears Nose and Throat: The external ears are normal appearing,  Oral mucosa is pink and moist.  Respiratory:  No signs of respiratory distress. Lung sounds are clear bilaterally.  Cardiovascular:  S1 S2, Regular rate and rhythm.  GI: Abdomen is soft, symmetric, and non-tender without distention.  Bowel sounds are present and normoactive in all four quadrants. No masses, hepatomegaly, or splenomegaly are noted.   Neuro: No Tremor, No involuntary movements  Skin: No rashes, No Jaundice.          Data: (reviewed by attending)                        12.1   6.49  )-----------( 87       ( 22 Aug 2020 21:35 )             37.1     Hgb Trend:  12.1  20 @ 21:35  12.8  20 @ 20:11          138  |  99  |  13  ----------------------------<  110<H>  3.9   |  28  |  0.8    Ca    8.9      22 Aug 2020 21:35  Mg     2.0         TPro  8.2<H>  /  Alb  3.5  /  TBili  0.5  /  DBili  x   /  AST  88<H>  /  ALT  81<H>  /  AlkPhos  71      Liver panel trend:  TBili 0.5   /   AST 88   /   ALT 81   /   AlkP 71   /   Tptn 8.2   /   Alb 3.5    /   DBili --        TBili 0.5   /      /   ALT 88   /   AlkP 70   /   Tptn 9.0   /   Alb 3.9    /   DBili <0.2                    Radiology:(reviewed by attending)  CT Abdomen and Pelvis w/ IV Cont:   EXAM:  CT ABDOMEN AND PELVIS IC            PROCEDURE DATE:  2020            INTERPRETATION:  CLINICAL HISTORY: Abdominal pain.    TECHNIQUE: Contiguous axial CT images were obtained from the lower chest to the pubic symphysis following administration of Optiray intravenous contrast. Oral contrast was not administered. Reformatted images in the coronal and sagittal planes were acquired.    COMPARISON: CT abdomen and pelvis dated 2010.      FINDINGS:    LOWER CHEST: Bilateral dependentatelectasis    HEPATOBILIARY: Nodular liver contour. Cholecystectomy.    SPLEEN: Splenomegaly, new since     PANCREAS: Unremarkable.    ADRENAL GLANDS: Unremarkable.    KIDNEYS: No hydronephrosis bilaterally. Symmetric nephrograms. Punctate non obstructing calculus in the left upper renal pole. Bilateral small cysts and too small to further characterize hypodensities.    ABDOMINOPELVIC NODES: Prominent portacaval lymph nodes are noted.    PELVIC ORGANS: Unremarkable.    PERITONEUM/MESENTERY/BOWEL: Large colonic stool burden fecalization of contents within the distal small bowel. Normal appendix. No pneumoperitoneum or abdominopelvic free fluid.    BONES/SOFT TISSUES: No acute osseous abnormality. Degenerative changes of the lumbar spine.    VASCULAR: No evidence of portal vein thrombosis. Splenic vein is suboptimally opacified. Normal caliber aorta.      IMPRESSION:      Large colonic stool burden and fecalization of the distal small bowel which may reflect a clinical diagnosis of constipation or functional bowel disease.    Cirrhotic changes with evidence of portal hypertension. No evidence of portal vein thrombosis.    Nonspecific enlarged portacaval nodes are likely reactive.            MELO CAMPOS M.D., RESIDENT RADIOLOGIST  This document has been electronically signed.  MAYO PAULINO M.D., ATTENDING RADIOLOGIST  This document has been electronically signed. Aug 23 2020 12:07AM             (20 @ 23:20)

## 2020-08-23 NOTE — H&P ADULT - ASSESSMENT
53 yr F with HTN, anxiety and uterine fibroids, Hx of drugs abuse, depression and frequents [facility] admissions was bought in for evaluation of syncope and abd pain.    #abd pain for 3 months with vaginal foul smelling and weight gain:  - Dx severe chronic constipation, fibroid mass, ovarian mass (but no evidence in CT, send markers)  - start Miralax, lactulose and senna  - although no evidence of sepsis, +ve UA and fever at home, start Rocephin  - start fluconazole for vaginal discharge   - check TSH, f/u Ucx , Chlamydia and gonorrhea     #Syncope: CTH negative, normal EKG, could be from UTI or severe pain   - check orthostatic, TSh and troponin     #liver cirrhosis with portal HTN on CT due to previous Hep C ?? VS MAI  - no biochemical evidence of cirrhosis except thrombocytopenia   - check COAG  - transaminitis 102/88: noted from previous labs numbers now better  - needs evaluation of chronic liver dz, Hx of drug abuse, from [facility]: Hep C qualitatives RNA +ve, will send PCR  - Hold Lipitor     #HTN:  - losartan, HTZC and metoprolol     #depression abd anxiety  - Seroquel and escitalopram       #Hx of drug abuse  - Suboxone  - send Utox     #Diet: full diet  #DVT pro: Lovenox  #GI pro: PPI  #Dispo: from home 53 yr F with HTN, anxiety and uterine fibroids, Hx of drugs abuse, depression and frequents [facility] admissions was bought in for evaluation of syncope and abd pain.    #abd pain for 3 months with vaginal foul smelling and weight gain:  - Dx severe chronic constipation, fibroid mass, ovarian mass (but no evidence in CT, send markers)  - start Miralax, lactulose and senna  - although no evidence of sepsis, +ve UA and fever at home, start Rocephin  - start miconazole for suspected vaginitis,  - check TSH, f/u Ucx , Chlamydia and gonorrhea     #Syncope: CTH negative, normal EKG, could be from UTI or severe pain   - check orthostatic, TSh and troponin     #liver cirrhosis with portal HTN on CT due to previous Hep C ?? VS MAI  - no biochemical evidence of cirrhosis except thrombocytopenia   - check COAG  - transaminitis 102/88: noted from previous labs numbers now better  - needs evaluation of chronic liver dz, Hx of drug abuse, from [facility]: Hep C qualitatives RNA +ve, will send PCR  - Hold Lipitor     #HTN:  - losartan, HTZC and metoprolol     #depression abd anxiety  - Seroquel and escitalopram       #Hx of drug abuse  - Suboxone  - send Utox     #Diet: full diet  #DVT pro: Lovenox  #GI pro: PPI  #Dispo: from home

## 2020-08-23 NOTE — H&P ADULT - NSHPLABSRESULTS_GEN_ALL_CORE
< from: CT Abdomen and Pelvis w/ IV Cont (08.22.20 @ 23:20) >    IMPRESSION:      Large colonic stool burden and fecalization of the distal small bowel which may reflect a clinical diagnosis of constipation or functional bowel disease.    Cirrhotic changes with evidence of portal hypertension. No evidence of portal vein thrombosis.    Nonspecific enlarged portacaval nodes are likely reactive.    < end of copied text >    < from: US Transvaginal (08.22.20 @ 22:08) >    IMPRESSION:    Broad-based 5.1 x 4.3 x 4.2 cm subserosal fibroid in the posterior uterine corpus. The uterus is otherwise unremarkable.    Nonvisualization of the ovaries.    < end of copied text >        LABS:                        12.1   6.49  )-----------( 87       ( 22 Aug 2020 21:35 )             37.1     22 Aug 2020 21:35    138    |  99     |  13     ----------------------------<  110    3.9     |  28     |  0.8      Ca    8.9        22 Aug 2020 21:35  Mg     2.0       22 Aug 2020 21:35    TPro  8.2    /  Alb  3.5    /  TBili  0.5    /  DBili  x      /  AST  88     /  ALT  81     /  AlkPhos  71     22 Aug 2020 21:35

## 2020-08-23 NOTE — CONSULT NOTE ADULT - ASSESSMENT
53 yr F with HTN, anxiety and uterine fibroids, Hx of drugs abuse, previous Hep C ? depression and frequents [facility] admissions was bought in for evaluation of syncope and abd pain. GI consulted for initial workup showing liver cirrhosis with portal hypertension , and for constipation.      #Liver cirrhosis pf unknown etiology  : no signs of decompensated liver disease / transaminitis of hepatocellular pattern   r/o MAI versus others   No encephalopathy    No ascites of CT abdomen and pelvis     REC:   CLD work up :   check INR   Hepatitis panel , HBV PCR , HCV PCR , BRIAN , ASMA , AMA , ANTI LKM , Ferritin , iron saturation , ceruloplasmin , IGG levels , alpha one anti trypsin   US abdomen   check for ascites , if positive , then paracentesis for fluid albumin level  , cytology , cell count and differential , total protein level  duplex hepatic veins   patient will require EGD for variceal screening as outpatient   US abdomen and alpha fetoprotein q 6 months for HCC screening     #constipation :   no obstruction on CT abdomen and pelvis   REC:   tap water enema QID   Miralax BID  senna 2 tabs daily     #weight loss:   EGD/ colon as outpatient       incomplete note 53 yr F with HTN, anxiety and uterine fibroids, Hx of drugs abuse, previous Hep C ? depression and frequents [facility] admissions was bought in for evaluation of syncope and abd pain. GI consulted for initial workup showing liver cirrhosis with portal hypertension , and for constipation.      #Liver cirrhosis pf unknown etiology  : no signs of decompensated liver disease / transaminitis of hepatocellular pattern   r/o MAI versus others   No encephalopathy    No previous history of GI bleed   No ascites of CT abdomen and pelvis     REC:   check INR   CLD work up :   Hepatitis panel , HBV PCR , HCV PCR , BRIAN , ASMA , AMA , ANTI LKM , Ferritin , iron saturation , ceruloplasmin , IGG levels , alpha one anti trypsin   US abdomen   check for ascites , if positive , then paracentesis for fluid albumin level  , cytology , cell count and differential , total protein level  duplex hepatic veins   patient will require EGD for variceal screening as outpatient   US abdomen and alpha fetoprotein q 6 months for HCC screening     #constipation :   no obstruction on CT abdomen and pelvis   REC:   tap water enema QID   Miralax BID  senna 2 tabs daily     #weight loss:   EGD/ colon as outpatient 53 yr F with HTN, anxiety and uterine fibroids, Hx of drugs abuse, previous Hep C ? depression and frequents [facility] admissions was bought in for evaluation of syncope and abd pain. GI consulted for initial workup showing liver cirrhosis with portal hypertension , and for constipation.      #Liver cirrhosis pf unknown etiology  : no signs of decompensated liver disease / transaminitis of hepatocellular pattern   r/o MAI versus others   No encephalopathy    No previous history of GI bleed   No ascites of CT abdomen and pelvis     REC:   check INR   CLD work up :   Hepatitis panel , HBV PCR , HCV PCR , BRIAN , ASMA , AMA , ANTI LKM , Ferritin , iron saturation , ceruloplasmin , IGG levels , alpha one anti trypsin   patient will require EGD for variceal screening as outpatient   US abdomen and alpha fetoprotein q 6 months for HCC screening     #constipation :   no obstruction on CT abdomen and pelvis   REC:   tap water enema QID   Miralax BID  senna 2 tabs daily     #weight loss:   EGD/ colon as outpatient

## 2020-08-23 NOTE — H&P ADULT - ATTENDING COMMENTS
53 yr F with HTN, anxiety and uterine fibroids, Hx of drugs abuse, previous Hep C?, depression admitted for syncope. As per patient, she was at work and lost consciousness while taking to colleague. She woke up 2 mins later and was sent to ER. Patient also complains of lower abdominal pain for the past 3 days associated with one episode of fever yesterday (102).     # Abd pain - multifactorial: UTI vs Vaginal infection vs protal HTN vs Constipation    # Syncope secondary to underlying infection vs arrhythmia vs orthostatic  - CTH negative  - EKG: NSR  - trop <0.01  - check orthostatic  - f/u TSH   - check echo    - tele monitoring for 24 hrs     # UTI  - start ceftriaxone   - f/u blood and urine culture     # Suspected trichomonas vaginalis  - start Flagyl  - US Transvaginal (08.22.20): Broad-based 5.1 x 4.3 x 4.2 cm subserosal fibroid in the posterior uterine corpus. The uterus is otherwise unremarkable.  - Gyn consult     # Suspected liver cirrhosis with portal HTN and splenomegaly  - CT Abdomen and Pelvis w/ IV Cont (08.22.20): Cirrhotic changes with evidence of portal hypertension. No evidence of portal vein thrombosis.  - CLD work up  - GI consult     # HTN  - bp controlled   - losartan, HTZC and metoprolol     # Full code 53 yr F with HTN, anxiety and uterine fibroids, Hx of drugs abuse, previous Hep C?, depression admitted for syncope. As per patient, she was at work and lost consciousness while taking to colleague. She woke up 2 mins later and was sent to ER. Patient also complains of lower abdominal pain for the past 3 days associated with one episode of fever yesterday (102).     # Abd pain - multifactorial: UTI vs Vaginal infection vs protal HTN vs Constipation    # Syncope secondary to underlying infection vs arrhythmia vs orthostatic  - CTH negative  - EKG: NSR  - trop <0.01  - check orthostatic  - f/u TSH   - check echo    - tele monitoring for 24 hrs     # UTI  - start ceftriaxone   - f/u blood and urine culture     # Suspected trichomonas vaginalis  - start Flagyl  - US Transvaginal (08.22.20): Broad-based 5.1 x 4.3 x 4.2 cm subserosal fibroid in the posterior uterine corpus. The uterus is otherwise unremarkable.  - Gyn consult     # Suspected liver cirrhosis with portal HTN and splenomegaly  - CT Abdomen and Pelvis w/ IV Cont (08.22.20): Cirrhotic changes with evidence of portal hypertension. No evidence of portal vein thrombosis.  - CLD work up  - GI consult     # HTN  - bp controlled   - losartan, HTZC and metoprolol     # Constipation   - start Miralax and senna     # Full code

## 2020-08-24 LAB
A1AT SERPL-MCNC: 125 MG/DL — SIGNIFICANT CHANGE UP (ref 90–200)
ALBUMIN SERPL ELPH-MCNC: 3.4 G/DL — LOW (ref 3.5–5.2)
ALP SERPL-CCNC: 70 U/L — SIGNIFICANT CHANGE UP (ref 30–115)
ALT FLD-CCNC: 76 U/L — HIGH (ref 0–41)
ANION GAP SERPL CALC-SCNC: 8 MMOL/L — SIGNIFICANT CHANGE UP (ref 7–14)
APTT BLD: 46.7 SEC — HIGH (ref 27–39.2)
AST SERPL-CCNC: 83 U/L — HIGH (ref 0–41)
BASOPHILS # BLD AUTO: 0.04 K/UL — SIGNIFICANT CHANGE UP (ref 0–0.2)
BASOPHILS NFR BLD AUTO: 0.7 % — SIGNIFICANT CHANGE UP (ref 0–1)
BILIRUB SERPL-MCNC: 0.9 MG/DL — SIGNIFICANT CHANGE UP (ref 0.2–1.2)
BUN SERPL-MCNC: 8 MG/DL — LOW (ref 10–20)
CALCIUM SERPL-MCNC: 8.6 MG/DL — SIGNIFICANT CHANGE UP (ref 8.5–10.1)
CANCER AG125 SERPL-ACNC: 11 U/ML — SIGNIFICANT CHANGE UP
CEA SERPL-MCNC: 4.2 NG/ML — HIGH (ref 0–3.8)
CERULOPLASMIN SERPL-MCNC: 21 MG/DL — SIGNIFICANT CHANGE UP (ref 16–45)
CHLORIDE SERPL-SCNC: 103 MMOL/L — SIGNIFICANT CHANGE UP (ref 98–110)
CO2 SERPL-SCNC: 29 MMOL/L — SIGNIFICANT CHANGE UP (ref 17–32)
CREAT SERPL-MCNC: 0.6 MG/DL — LOW (ref 0.7–1.5)
EOSINOPHIL # BLD AUTO: 0.08 K/UL — SIGNIFICANT CHANGE UP (ref 0–0.7)
EOSINOPHIL NFR BLD AUTO: 1.3 % — SIGNIFICANT CHANGE UP (ref 0–8)
FERRITIN SERPL-MCNC: 193 NG/ML — HIGH (ref 15–150)
GLUCOSE SERPL-MCNC: 80 MG/DL — SIGNIFICANT CHANGE UP (ref 70–99)
HAV IGM SER-ACNC: SIGNIFICANT CHANGE UP
HBV CORE IGM SER-ACNC: SIGNIFICANT CHANGE UP
HBV SURFACE AB SER-ACNC: REACTIVE
HBV SURFACE AG SER-ACNC: SIGNIFICANT CHANGE UP
HCT VFR BLD CALC: 38.7 % — SIGNIFICANT CHANGE UP (ref 37–47)
HCV AB S/CO SERPL IA: 15.32 S/CO — HIGH (ref 0–0.99)
HCV AB S/CO SERPL IA: 38.68 COI — HIGH
HCV AB SERPL-IMP: REACTIVE
HCV AB SERPL-IMP: REACTIVE
HGB BLD-MCNC: 12.8 G/DL — SIGNIFICANT CHANGE UP (ref 12–16)
IMM GRANULOCYTES NFR BLD AUTO: 0.2 % — SIGNIFICANT CHANGE UP (ref 0.1–0.3)
INR BLD: 1.34 RATIO — HIGH (ref 0.65–1.3)
LYMPHOCYTES # BLD AUTO: 3.96 K/UL — HIGH (ref 1.2–3.4)
LYMPHOCYTES # BLD AUTO: 66 % — HIGH (ref 20.5–51.1)
MAGNESIUM SERPL-MCNC: 1.8 MG/DL — SIGNIFICANT CHANGE UP (ref 1.8–2.4)
MCHC RBC-ENTMCNC: 31.8 PG — HIGH (ref 27–31)
MCHC RBC-ENTMCNC: 33.1 G/DL — SIGNIFICANT CHANGE UP (ref 32–37)
MCV RBC AUTO: 96.3 FL — SIGNIFICANT CHANGE UP (ref 81–99)
MONOCYTES # BLD AUTO: 0.4 K/UL — SIGNIFICANT CHANGE UP (ref 0.1–0.6)
MONOCYTES NFR BLD AUTO: 6.7 % — SIGNIFICANT CHANGE UP (ref 1.7–9.3)
NEUTROPHILS # BLD AUTO: 1.51 K/UL — SIGNIFICANT CHANGE UP (ref 1.4–6.5)
NEUTROPHILS NFR BLD AUTO: 25.1 % — LOW (ref 42.2–75.2)
NRBC # BLD: 0 /100 WBCS — SIGNIFICANT CHANGE UP (ref 0–0)
PLATELET # BLD AUTO: 69 K/UL — LOW (ref 130–400)
POTASSIUM SERPL-MCNC: 4.2 MMOL/L — SIGNIFICANT CHANGE UP (ref 3.5–5)
POTASSIUM SERPL-SCNC: 4.2 MMOL/L — SIGNIFICANT CHANGE UP (ref 3.5–5)
PROT SERPL-MCNC: 8.2 G/DL — HIGH (ref 6–8)
PROTHROM AB SERPL-ACNC: 15.4 SEC — HIGH (ref 9.95–12.87)
RBC # BLD: 4.02 M/UL — LOW (ref 4.2–5.4)
RBC # FLD: 14.1 % — SIGNIFICANT CHANGE UP (ref 11.5–14.5)
SODIUM SERPL-SCNC: 140 MMOL/L — SIGNIFICANT CHANGE UP (ref 135–146)
TSH SERPL-MCNC: 2.17 UIU/ML — SIGNIFICANT CHANGE UP (ref 0.27–4.2)
WBC # BLD: 6 K/UL — SIGNIFICANT CHANGE UP (ref 4.8–10.8)
WBC # FLD AUTO: 6 K/UL — SIGNIFICANT CHANGE UP (ref 4.8–10.8)

## 2020-08-24 PROCEDURE — 93306 TTE W/DOPPLER COMPLETE: CPT | Mod: 26

## 2020-08-24 PROCEDURE — 99232 SBSQ HOSP IP/OBS MODERATE 35: CPT

## 2020-08-24 PROCEDURE — 99233 SBSQ HOSP IP/OBS HIGH 50: CPT

## 2020-08-24 RX ORDER — POLYETHYLENE GLYCOL 3350 17 G/17G
17 POWDER, FOR SOLUTION ORAL
Refills: 0 | Status: DISCONTINUED | OUTPATIENT
Start: 2020-08-24 | End: 2020-08-27

## 2020-08-24 RX ADMIN — PANTOPRAZOLE SODIUM 40 MILLIGRAM(S): 20 TABLET, DELAYED RELEASE ORAL at 05:41

## 2020-08-24 RX ADMIN — Medication 100 MILLIGRAM(S): at 05:41

## 2020-08-24 RX ADMIN — LOSARTAN POTASSIUM 100 MILLIGRAM(S): 100 TABLET, FILM COATED ORAL at 05:41

## 2020-08-24 RX ADMIN — BUPRENORPHINE AND NALOXONE 1 TABLET(S): 2; .5 TABLET SUBLINGUAL at 11:15

## 2020-08-24 RX ADMIN — QUETIAPINE FUMARATE 100 MILLIGRAM(S): 200 TABLET, FILM COATED ORAL at 05:41

## 2020-08-24 RX ADMIN — QUETIAPINE FUMARATE 100 MILLIGRAM(S): 200 TABLET, FILM COATED ORAL at 17:17

## 2020-08-24 RX ADMIN — Medication 12.5 MILLIGRAM(S): at 05:41

## 2020-08-24 RX ADMIN — ESCITALOPRAM OXALATE 10 MILLIGRAM(S): 10 TABLET, FILM COATED ORAL at 11:15

## 2020-08-24 RX ADMIN — LACTULOSE 30 GRAM(S): 10 SOLUTION ORAL at 05:41

## 2020-08-24 RX ADMIN — CEFTRIAXONE 100 MILLIGRAM(S): 500 INJECTION, POWDER, FOR SOLUTION INTRAMUSCULAR; INTRAVENOUS at 01:24

## 2020-08-24 RX ADMIN — Medication 100 MILLIGRAM(S): at 17:17

## 2020-08-24 RX ADMIN — GABAPENTIN 100 MILLIGRAM(S): 400 CAPSULE ORAL at 05:41

## 2020-08-24 RX ADMIN — GABAPENTIN 100 MILLIGRAM(S): 400 CAPSULE ORAL at 13:43

## 2020-08-24 RX ADMIN — POLYETHYLENE GLYCOL 3350 17 GRAM(S): 17 POWDER, FOR SOLUTION ORAL at 17:17

## 2020-08-24 RX ADMIN — GABAPENTIN 100 MILLIGRAM(S): 400 CAPSULE ORAL at 22:07

## 2020-08-24 RX ADMIN — LACTULOSE 30 GRAM(S): 10 SOLUTION ORAL at 13:43

## 2020-08-24 RX ADMIN — ENOXAPARIN SODIUM 40 MILLIGRAM(S): 100 INJECTION SUBCUTANEOUS at 22:08

## 2020-08-24 NOTE — CONSULT NOTE ADULT - SUBJECTIVE AND OBJECTIVE BOX
Chief Complaint: abdominal pain     HPI: 54 yo P3 postmenopausal (poor historian) with HTN, depression with prior suicide attempts, anxiety, polysubstance abuse, uterine fibroids, and hepatitis C admitted for evaluation of syncope and abdominal pain. Reports intermittent diffuse abdominal pain for the past three months, described as dull and cramping, rated 4/10 in intensity, that responds to OTC pain relievers. Pt reports that for the past month she has noted pink tinged discharge when wiping. Pt denies vaginal itching or foul vaginal odor. Not sexually active. Pt reports 30 lb weight gain over the past three months. Pt reports constipation (two bowel movements a week, chronic). Reports associated dysuria, urinary frequency, urinary hesitancy and foul smelling urine. H/o self-reported fevers at home, did not take temperature, has been afebrile during admission.     Ob/Gyn History:  P3 (2 LTCS, 1 )   LMP - 2 years ago                   Cycle Length - regular per pt   H/o ?ovarian cyst s/p ovarian cystectomy at [facility]. H/o ?uterine fibroids s/p myomectomy at [facility]. Denies history of abnormal paps or STIs  Last Pap Smear - 10 years ago per pt   Last Mammogram - 5 years ago per pt   Last Colonoscopy - 2 year ago per pt       Denies the following: constitutional symptoms, visual symptoms, cardiovascular symptoms, respiratory symptoms, GI symptoms, musculoskeletal symptoms, skin symptoms, neurologic symptoms, hematologic symptoms, allergic symptoms, psychiatric symptoms  Except any pertinent positives listed.     Home Medications:  buprenorphine-naloxone 8 mg-2 mg sublingual tablet: 1 tab sublingual 2 times a day (11 Dec 2018 15:40)  gabapentin 100 mg oral capsule: 1 cap(s) orally 3 times a day (11 Dec 2018 15:40)  hydroCHLOROthiazide 12.5 mg oral capsule: 1 cap(s) orally once a day (19 Oct 2018 16:48)  losartan 100 mg oral tablet: 1 tab(s) orally once a day (23 Aug 2020 02:05)  Metoprolol Tartrate 100 mg oral tablet: 1 tab(s) orally 2 times a day (19 Oct 2018 16:48)  omeprazole 20 mg oral delayed release capsule: 1 cap(s) orally once a day (19 Oct 2018 16:48)  SEROquel 100 mg oral tablet: 1 tab(s) orally 2 times a day (23 Aug 2020 02:07)  simvastatin 40 mg oral tablet: 1 tab(s) orally once a day (at bedtime) (23 Aug 2020 02:05)      Allergies: Benadryl (Unknown)    Intolerances: None       PAST MEDICAL & SURGICAL HISTORY:  Hypertension  Panic attacks  Depression  Anxiety  H/O:   History of cholecystectomy  ?Ovarian cystectomy   ?Myomectomy        FAMILY HISTORY:  No pertinent family history in first degree relatives      SOCIAL HISTORY: Denies cigarette use, alcohol use, or illicit drug use    Vital Signs Last 24 Hrs  T(F): 97.1 (24 Aug 2020 05:46), Max: 97.9 (23 Aug 2020 15:44)  HR: 67 (24 Aug 2020 05:46) (60 - 67)  BP: 141/85 (24 Aug 2020 05:46) (130/62 - 159/72)  RR: 18 (24 Aug 2020 05:46) (18 - 18)    General Appearance - AAOx3, NAD  Heart - S1S2 regular rate and rhythm  Lung - CTA Bilaterally  Abdomen - Soft, nontender, nondistended, no rebound, no rigidity, no guarding, bowel sounds present, R CVA tenderness     GYN/Pelvis:    Labia Majora - Normal  Labia Minora - Normal  Clitoris - Normal  Urethra - Normal  Vagina - Atrophic, no vaginal bleeding  Cervix - flush with vagina, stenotic, no CMT    Uterus:  Size - Normal  Tenderness - None  Mass - None  Freely mobile    Adnexa:  Masses - None  Tenderness - None      LABS:                        12.8   6.00  )-----------( 69       ( 24 Aug 2020 06:05 )             38.7       ABO RH Interpretation: A POS (20 @ 11:34)  Antibody Screen: NEG (20 @ 11:34)    08-    140  |  103  |  8<L>  ----------------------------<  80  4.2   |  29  |  0.6<L>    Ca    8.6      24 Aug 2020 06:05  Mg     1.8     08-24    TPro  8.2<H>  /  Alb  3.4<L>  /  TBili  0.9  /  DBili  x   /  AST  83<H>  /  ALT  76<H>  /  AlkPhos  70  24    PT/INR - ( 24 Aug 2020 06:05 )   PT: 15.40 sec;   INR: 1.34 ratio         PTT - ( 24 Aug 2020 06:05 )  PTT:46.7 sec  Urinalysis Basic - ( 22 Aug 2020 20:11 )    Color: Yellow / Appearance: Turbid / S.019 / pH: x  Gluc: x / Ketone: Negative  / Bili: Negative / Urobili: 3 mg/dL   Blood: x / Protein: 30 mg/dL / Nitrite: Negative   Leuk Esterase: Large / RBC: 3 /HPF /  /HPF   Sq Epi: x / Non Sq Epi: 1 /HPF / Bacteria: Many        Culture - Urine (collected 20 @ 20:11)  Source: .Urine Clean Catch (Midstream)  Preliminary Report (20 @ 10:21):    >100,000 CFU/ml Gram Negative Rods        RADIOLOGY & ADDITIONAL STUDIES:  < from: US Transvaginal (20 @ 22:08) >  EXAM:  US TRANSVAGINAL        PROCEDURE DATE:  2020    INTERPRETATION:  CLINICAL HISTORY: Lower abdominal pain, vaginal discharge. History of fibroids.  COMPARISON: Correlation is made with CT abdomen and pelvis dated 2010.  PROCEDURE: Transabdominal and transvaginal ultrasound of the pelvis was performed, including Doppler.  LMP: Postmenopausal.    FINDINGS:    UTERUS: Measures 9.8 x 6.3 x 5.9 cm and is normal in echotexture. Broad-based 5.1 x 4.3 x 4.2 cm subserosal fibroid of the posterior uterine corpus. The endometrial echo complex measures 0.5 cm, which is normal in thickness.    RIGHT OVARY: Not visualized.    LEFT OVARY: Not visualized.    OTHER: No free fluid in the pelvis.      IMPRESSION:    Broad-based 5.1 x 4.3 x 4.2 cm subserosal fibroid in the posterior uterine corpus. The uterus is otherwise unremarkable.    Nonvisualization of the ovaries.        MELO CAMPOS M.D., RESIDENT RADIOLOGIST  This document has been electronically signed.  MAYO PAULINO M.D., ATTENDING RADIOLOGIST  This document has been electronically signed. Aug 22 2020 11:11PM  < end of copied text >      < from: CT Abdomen and Pelvis w/ IV Cont (20 @ 23:20) >  EXAM:  CT ABDOMEN AND PELVIS IC        PROCEDURE DATE:  2020    INTERPRETATION:  CLINICAL HISTORY: Abdominal pain.  TECHNIQUE: Contiguous axial CT images were obtained from the lower chest to the pubic symphysis following administration of Optiray intravenous contrast. Oral contrast was not administered. Reformatted images in the coronal and sagittal planes were acquired.  COMPARISON: CT abdomen and pelvis dated 2010.  FINDINGS:    LOWER CHEST: Bilateral dependentatelectasis    HEPATOBILIARY: Nodular liver contour. Cholecystectomy.    SPLEEN: Splenomegaly, new since     PANCREAS: Unremarkable.    ADRENAL GLANDS: Unremarkable.    KIDNEYS: No hydronephrosis bilaterally. Symmetric nephrograms. Punctate nonobstructing calculus in the left upper renal pole. Bilateral small cysts and too small to further characterize hypodensities.    ABDOMINOPELVIC NODES: Prominent portacaval lymph nodes are noted.    PELVIC ORGANS: Unremarkable.    PERITONEUM/MESENTERY/BOWEL: Large colonic stool burden fecalization of contents within the distal small bowel. Normal appendix. No pneumoperitoneum or abdominopelvic free fluid.    BONES/SOFT TISSUES: No acute osseous abnormality. Degenerative changes of the lumbar spine.    VASCULAR: No evidence of portal vein thrombosis. Splenic vein is suboptimally opacified. Normal caliber aorta.      IMPRESSION:      Large colonic stool burden and fecalization of the distal small bowel which may reflect a clinical diagnosis of constipation or functional bowel disease.    Cirrhotic changes with evidence of portal hypertension. No evidence of portal vein thrombosis.    Nonspecific enlarged portacaval nodes are likely reactive.        MELO CAMPOS M.D., RESIDENT RADIOLOGIST  This document has been electronically signed.  MAYO PAULINO M.D., ATTENDING RADIOLOGIST  This document has been electronically signed. Aug 23 2020 12:07AM  < end of copied text >

## 2020-08-24 NOTE — CONSULT NOTE ADULT - ASSESSMENT
A/P: 56 yo P3 postmenopausal (poor historian) with HTN, depression with prior suicide attempts, anxiety, polysubstance abuse, uterine fibroids, and hepatitis C; abdominal pain not of GYN origin with no vaginal bleeding or discharge noted on exam, likely constipation   -vaginitis swab obtained  -discussed need to obtain endometrial biopsy in light of patient self reporting vaginal bleeding to r/o endometrial cancer, pt refused at this point in time   -record release obtained, will send release to [facility] to obtain records relating to patient reported GYN procedures  -recommend discontinuing flagyl for presumed treatment of trichomoniasis by primary team   -syncope work-up per primary etam   -will re-offer endometrial biopsy tomorrow   -recommend outpatient follow-up at the [facility]       Dr. Beck and Dr. Ho aware A/P: 54 yo P3 postmenopausal (poor historian) with HTN, depression with prior suicide attempts, anxiety, polysubstance abuse, uterine fibroids, and hepatitis C; abdominal pain not of GYN origin with no vaginal bleeding or discharge noted on exam, likely constipation   -vaginitis swab obtained  -discussed need to obtain endometrial biopsy in light of patient self reporting vaginal bleeding to r/o endometrial cancer, pt refused at this point in time   -record release obtained, will send release to [facility] to obtain records relating to patient reported GYN procedures  -recommend discontinuing flagyl for presumed treatment of trichomoniasis by primary team as pt is not sexually active and there is no abnormal discharge, will f/u vaginitis swab  -syncope work-up per primary team  -will re-offer endometrial biopsy tomorrow   -recommend outpatient follow-up at the [facility]       Dr. Beck and Dr. Ho aware A/P: 56 yo P3 postmenopausal (poor historian) with HTN, depression with prior suicide attempts, anxiety, polysubstance abuse, uterine fibroids, and hepatitis C; abdominal pain not of GYN origin with no vaginal bleeding or discharge noted on exam, likely constipation   -vaginitis swab obtained  -discussed need to obtain endometrial biopsy in light of patient self reporting vaginal bleeding to r/o endometrial cancer, pt refused at this point in time   -record release obtained, will send release to [facility] to obtain records relating to patient reported GYN procedures  -recommend discontinuing flagyl for presumed treatment of trichomoniasis by primary team as pt is not sexually active and there is no abnormal discharge, will f/u vaginitis swab  -syncope work-up per primary team  -will re-offer endometrial biopsy tomorrow   -recommend outpatient follow-up at the [facility]       Dr. Beck and Dr. Ho aware.

## 2020-08-24 NOTE — PROGRESS NOTE ADULT - SUBJECTIVE AND OBJECTIVE BOX
ANA FERRELL  55y Female    CHIEF COMPLAINT:    Patient is a 55y old  Female who presents with a chief complaint of syncope (24 Aug 2020 06:11)      INTERVAL HPI/OVERNIGHT EVENTS:    Patient seen and examined.    ROS: All other systems are negative.    Vital Signs:    T(F): 97.1 (20 @ 05:46), Max: 97.9 (20 @ 15:44)  HR: 67 (20 @ 05:46) (60 - 67)  BP: 141/85 (20 @ 05:46) (130/62 - 159/72)  RR: 18 (20 @ 05:46) (18 - 18)  SpO2: 99% (20 @ 15:44) (99% - 99%)  I&O's Summary    23 Aug 2020 07:01  -  24 Aug 2020 07:00  --------------------------------------------------------  IN: 150 mL / OUT: 0 mL / NET: 150 mL      Daily Height in cm: 162.56 (23 Aug 2020 17:57)    Daily Weight in k.8 (24 Aug 2020 05:46)  CAPILLARY BLOOD GLUCOSE          PHYSICAL EXAM:    GENERAL:  NAD  SKIN: No rashes or lesions  HENT: Atrumatic. Normocephalic. PERRL. Moist membranes.  NECK: Supple, No JVD. No lymphadenopathy.  PULMONARY: CTA B/L. No wheezing. No rales  CVS: Normal S1, S2. Rate and Rythm are regular. No murmurs.  ABDOMEN/GI: Soft, Nontender, Nondistended; BS present  EXTREMITIES: Peripheral pulses intact. No edema B/L LE.  NEUROLOGIC:  No motor or sensory deficit.  PSYCH: Alert & oriented x 3    Consultant(s) Notes Reviewed:  [x ] YES  [ ] NO  Care Discussed with Consultants/Other Providers [ x] YES  [ ] NO    EKG reviewed  Telemetry reviewed    LABS:                        12.8   6.00  )-----------( 69       ( 24 Aug 2020 06:05 )             38.7     08-    141  |  102  |  12  ----------------------------<  104<H>  4.1   |  29  |  0.6<L>    Ca    8.6      23 Aug 2020 05:51  Mg     2.0         TPro  7.8  /  Alb  3.4<L>  /  TBili  0.4  /  DBili  <0.2  /  AST  77<H>  /  ALT  75<H>  /  AlkPhos  71      PT/INR - ( 24 Aug 2020 06:05 )   PT: 15.40 sec;   INR: 1.34 ratio         PTT - ( 24 Aug 2020 06:05 )  PTT:46.7 sec    Trop <0.01, CKMB --, CK --, 20 @ 05:51  Trop <0.01, CKMB --, CK --, 20 @ 20:11        RADIOLOGY & ADDITIONAL TESTS:      Imaging or report Personally Reviewed:  [ ] YES  [ ] NO    Medications:  Standing  buprenorphine 8 mG/naloxone 2 mG SL  Tablet 1 Tablet(s) SubLingual daily  cefTRIAXone   IVPB 1000 milliGRAM(s) IV Intermittent every 24 hours  enoxaparin Injectable 40 milliGRAM(s) SubCutaneous at bedtime  escitalopram 10 milliGRAM(s) Oral daily  gabapentin 100 milliGRAM(s) Oral three times a day  hydrochlorothiazide 12.5 milliGRAM(s) Oral daily  lactulose Syrup 30 Gram(s) Oral every 8 hours  losartan 100 milliGRAM(s) Oral daily  metoprolol tartrate 100 milliGRAM(s) Oral two times a day  metroNIDAZOLE  IVPB 500 milliGRAM(s) IV Intermittent every 12 hours  pantoprazole    Tablet 40 milliGRAM(s) Oral before breakfast  polyethylene glycol 3350 17 Gram(s) Oral daily  QUEtiapine Oral Tab/Cap - Peds 100 milliGRAM(s) Oral two times a day  senna 2 Tablet(s) Oral at bedtime    PRN Meds      Case discussed with resident    Care discussed with pt/family ANA FERRELL  55y Female    CHIEF COMPLAINT:    Patient is a 55y old  Female who presents with a chief complaint of syncope (24 Aug 2020 06:11)      INTERVAL HPI/OVERNIGHT EVENTS:    Patient seen and examined.    ROS: All other systems are negative.    Vital Signs:    T(F): 97.1 (20 @ 05:46), Max: 97.9 (20 @ 15:44)  HR: 67 (20 @ 05:46) (60 - 67)  BP: 141/85 (20 @ 05:46) (130/62 - 159/72)  RR: 18 (20 @ 05:46) (18 - 18)  SpO2: 99% (20 @ 15:44) (99% - 99%)  I&O's Summary    23 Aug 2020 07:01  -  24 Aug 2020 07:00  --------------------------------------------------------  IN: 150 mL / OUT: 0 mL / NET: 150 mL      Daily Height in cm: 162.56 (23 Aug 2020 17:57)    Daily Weight in k.8 (24 Aug 2020 05:46)  CAPILLARY BLOOD GLUCOSE          PHYSICAL EXAM:    GENERAL:  NAD  SKIN: No rashes or lesions  HENT: Atrumatic. Normocephalic. PERRL. Moist membranes.  NECK: Supple, No JVD. No lymphadenopathy.  PULMONARY: CTA B/L. No wheezing. No rales  CVS: Normal S1, S2. Rate and Rythm are regular. No murmurs.  ABDOMEN/GI: Soft, Nontender, Nondistended; BS present  EXTREMITIES: Peripheral pulses intact. No edema B/L LE.  NEUROLOGIC:  No motor or sensory deficit.  PSYCH: Alert & oriented x 3    Consultant(s) Notes Reviewed:  [x ] YES  [ ] NO  Care Discussed with Consultants/Other Providers [ x] YES  [ ] NO    EKG reviewed  Telemetry reviewed    LABS:                        12.8   6.00  )-----------( 69       ( 24 Aug 2020 06:05 )             38.7     08-    141  |  102  |  12  ----------------------------<  104<H>  4.1   |  29  |  0.6<L>    Ca    8.6      23 Aug 2020 05:51  Mg     2.0         TPro  7.8  /  Alb  3.4<L>  /  TBili  0.4  /  DBili  <0.2  /  AST  77<H>  /  ALT  75<H>  /  AlkPhos  71      PT/INR - ( 24 Aug 2020 06:05 )   PT: 15.40 sec;   INR: 1.34 ratio         PTT - ( 24 Aug 2020 06:05 )  PTT:46.7 sec    Trop <0.01, CKMB --, CK --, 20 @ 05:51  Trop <0.01, CKMB --, CK --, 20 @ 20:11        RADIOLOGY & ADDITIONAL TESTS:    < from: Transthoracic Echocardiogram (20 @ 08:15) >    Summary:   1. LV Ejection Fraction by Moore's Method with a biplane EF of 64 %.   2. Normal global left ventricular systolic function.   3. Spectral Doppler shows impaired relaxation pattern of left ventricular myocardial filling (Grade I diastolic dysfunction).   4. Mild left ventricular hypertrophy.   5. Mild mitral regurgitation.   6. Mild tricuspid regurgitation.    < end of copied text >    Imaging or report Personally Reviewed:  [ ] YES  [ ] NO    Medications:  Standing  buprenorphine 8 mG/naloxone 2 mG SL  Tablet 1 Tablet(s) SubLingual daily  cefTRIAXone   IVPB 1000 milliGRAM(s) IV Intermittent every 24 hours  enoxaparin Injectable 40 milliGRAM(s) SubCutaneous at bedtime  escitalopram 10 milliGRAM(s) Oral daily  gabapentin 100 milliGRAM(s) Oral three times a day  hydrochlorothiazide 12.5 milliGRAM(s) Oral daily  lactulose Syrup 30 Gram(s) Oral every 8 hours  losartan 100 milliGRAM(s) Oral daily  metoprolol tartrate 100 milliGRAM(s) Oral two times a day  metroNIDAZOLE  IVPB 500 milliGRAM(s) IV Intermittent every 12 hours  pantoprazole    Tablet 40 milliGRAM(s) Oral before breakfast  polyethylene glycol 3350 17 Gram(s) Oral daily  QUEtiapine Oral Tab/Cap - Peds 100 milliGRAM(s) Oral two times a day  senna 2 Tablet(s) Oral at bedtime    PRN Meds      Case discussed with resident    Care discussed with pt/family ANA FERRELL  55y Female    CHIEF COMPLAINT:    Patient is a 55y old  Female who presents with a chief complaint of syncope (24 Aug 2020 06:11)      INTERVAL HPI/OVERNIGHT EVENTS:    Patient seen and examined. C/O constipation and lower abdominal discomfort. No cp. No palpitations.     ROS: All other systems are negative.    Vital Signs:    T(F): 97.1 (20 @ 05:46), Max: 97.9 (20 @ 15:44)  HR: 67 (20 @ 05:46) (60 - 67)  BP: 141/85 (20 @ 05:46) (130/62 - 159/72)  RR: 18 (20 @ 05:46) (18 - 18)  SpO2: 99% (20 @ 15:44) (99% - 99%)  I&O's Summary    23 Aug 2020 07:01  -  24 Aug 2020 07:00  --------------------------------------------------------  IN: 150 mL / OUT: 0 mL / NET: 150 mL      Daily Height in cm: 162.56 (23 Aug 2020 17:57)    Daily Weight in k.8 (24 Aug 2020 05:46)  CAPILLARY BLOOD GLUCOSE          PHYSICAL EXAM:    GENERAL:  NAD  SKIN: No rashes or lesions  HENT: Atraumatic Normocephalic. PERRL. Moist membranes.  NECK: Supple, No JVD. No lymphadenopathy.  PULMONARY: CTA B/L. No wheezing. No rales  CVS: Normal S1, S2. Rate and Rhythm are regular. No murmurs.  ABDOMEN/GI: Soft, Nontender, distended; BS present  EXTREMITIES: Peripheral pulses intact. No edema B/L LE.  NEUROLOGIC:  No motor or sensory deficit.  PSYCH: Alert & oriented x 3    Consultant(s) Notes Reviewed:  [x ] YES  [ ] NO  Care Discussed with Consultants/Other Providers [ x] YES  [ ] NO    EKG reviewed  Telemetry reviewed    LABS:                        12.8   6.00  )-----------( 69       ( 24 Aug 2020 06:05 )             38.7     08-    141  |  102  |  12  ----------------------------<  104<H>  4.1   |  29  |  0.6<L>    Ca    8.6      23 Aug 2020 05:51  Mg     2.0         TPro  7.8  /  Alb  3.4<L>  /  TBili  0.4  /  DBili  <0.2  /  AST  77<H>  /  ALT  75<H>  /  AlkPhos  71      PT/INR - ( 24 Aug 2020 06:05 )   PT: 15.40 sec;   INR: 1.34 ratio         PTT - ( 24 Aug 2020 06:05 )  PTT:46.7 sec    Trop <0.01, CKMB --, CK --, 20 @ 05:51  Trop <0.01, CKMB --, CK --, 20 @ 20:11        RADIOLOGY & ADDITIONAL TESTS:    < from: Transthoracic Echocardiogram (20 @ 08:15) >    Summary:   1. LV Ejection Fraction by Moore's Method with a biplane EF of 64 %.   2. Normal global left ventricular systolic function.   3. Spectral Doppler shows impaired relaxation pattern of left ventricular myocardial filling (Grade I diastolic dysfunction).   4. Mild left ventricular hypertrophy.   5. Mild mitral regurgitation.   6. Mild tricuspid regurgitation.    < end of copied text >    Imaging or report Personally Reviewed:  [ ] YES  [ ] NO    Medications:  Standing  buprenorphine 8 mG/naloxone 2 mG SL  Tablet 1 Tablet(s) SubLingual daily  cefTRIAXone   IVPB 1000 milliGRAM(s) IV Intermittent every 24 hours  enoxaparin Injectable 40 milliGRAM(s) SubCutaneous at bedtime  escitalopram 10 milliGRAM(s) Oral daily  gabapentin 100 milliGRAM(s) Oral three times a day  hydrochlorothiazide 12.5 milliGRAM(s) Oral daily  lactulose Syrup 30 Gram(s) Oral every 8 hours  losartan 100 milliGRAM(s) Oral daily  metoprolol tartrate 100 milliGRAM(s) Oral two times a day  metroNIDAZOLE  IVPB 500 milliGRAM(s) IV Intermittent every 12 hours  pantoprazole    Tablet 40 milliGRAM(s) Oral before breakfast  polyethylene glycol 3350 17 Gram(s) Oral daily  QUEtiapine Oral Tab/Cap - Peds 100 milliGRAM(s) Oral two times a day  senna 2 Tablet(s) Oral at bedtime    PRN Meds      Case discussed with resident    Care discussed with pt/family

## 2020-08-24 NOTE — PROGRESS NOTE ADULT - ASSESSMENT
53 yr F with HTN, anxiety and uterine fibroids, Hx of drugs abuse, previous Hep C ? depression and frequents [facility] was bought in for evaluation of syncope and abd pain. Pt reports having lower abd pain since 3 months, that has been worsening for the past 3 days, associated with pinky and foul smelling vaginal discharge. 53 yr F with HTN, anxiety and uterine fibroids, Hx of drugs abuse, previous Hep C ? depression and frequents [facility] was bought in for evaluation of syncope and abd pain. Pt reports having lower abd pain since 3 months, that has been worsening for the past 3 days, associated with pinky and foul smelling vaginal discharge.      1.	Syncope  2.	Constipation  3.	Lower abdominal pain likely due to #2  4.	Fibroid uterus  5.	H/O Drug abuse on Suboxone.   6.	Panic attacks  7.	UTI          PLAN:    ·	No events on tele  ·	ECHO showed EF of 64%  ·	GYN eval noted. Recommended uterine biopsy to R/O malignancy, but pt refused it. 53 yr F with HTN, anxiety and uterine fibroids, Hx of drugs abuse, previous Hep C ? depression and frequents [facility] was bought in for evaluation of syncope and abd pain. Pt reports having lower abd pain since 3 months, that has been worsening for the past 3 days, associated with pinky and foul smelling vaginal discharge.      1.	Syncope likely vasovagal  2.	Constipation  3.	Lower abdominal pain likely due to #2  4.	Fibroid uterus  5.	H/O Drug abuse on Suboxone.   6.	Panic attacks  7.	UTI          PLAN:    ·	No events on tele. Cont tele for 24 hrs  ·	ECHO showed EF of 64%  ·	GYN eval noted. Recommended uterine biopsy to R/O malignancy. Spoke to the pt. Agrees with that. Will recall OBGYN  ·	GI eval noted. Will order the W/U recommended by GI  ·	Tap water enema four times a day as per GI  ·	Cont Senna and Lactulose.   ·	Cont Rocephin for UTI. Check urine cxs.   ·	Cont her other meds.   ·	H/H is stable.

## 2020-08-24 NOTE — PROGRESS NOTE ADULT - SUBJECTIVE AND OBJECTIVE BOX
Hospital Day:  1d    Chief Complaint: Patient is a 55y old  Female who presents with a chief complaint of syncope (23 Aug 2020 07:26)    24 hour events:    Past Medical Hx:   Hypertension  Panic attacks  Depression  Anxiety    Past Sx:  H/O:   History of cholecystectomy    Allergies:  Benadryl (Unknown)    Current Meds:   Standing Meds:  buprenorphine 8 mG/naloxone 2 mG SL  Tablet 1 Tablet(s) SubLingual daily  cefTRIAXone   IVPB 1000 milliGRAM(s) IV Intermittent every 24 hours  enoxaparin Injectable 40 milliGRAM(s) SubCutaneous at bedtime  escitalopram 10 milliGRAM(s) Oral daily  gabapentin 100 milliGRAM(s) Oral three times a day  hydrochlorothiazide 12.5 milliGRAM(s) Oral daily  lactulose Syrup 30 Gram(s) Oral every 8 hours  losartan 100 milliGRAM(s) Oral daily  metoprolol tartrate 100 milliGRAM(s) Oral two times a day  metroNIDAZOLE  IVPB 500 milliGRAM(s) IV Intermittent every 12 hours  pantoprazole    Tablet 40 milliGRAM(s) Oral before breakfast  polyethylene glycol 3350 17 Gram(s) Oral daily  QUEtiapine Oral Tab/Cap - Peds 100 milliGRAM(s) Oral two times a day  senna 2 Tablet(s) Oral at bedtime    PRN Meds:    HOME MEDICATIONS:  buprenorphine-naloxone 8 mg-2 mg sublingual tablet: 1 tab sublingual 2 times a day  gabapentin 100 mg oral capsule: 1 cap(s) orally 3 times a day  hydroCHLOROthiazide 12.5 mg oral capsule: 1 cap(s) orally once a day  losartan 100 mg oral tablet: 1 tab(s) orally once a day  Metoprolol Tartrate 100 mg oral tablet: 1 tab(s) orally 2 times a day  omeprazole 20 mg oral delayed release capsule: 1 cap(s) orally once a day  SEROquel 100 mg oral tablet: 1 tab(s) orally 2 times a day  simvastatin 40 mg oral tablet: 1 tab(s) orally once a day (at bedtime)      Vital Signs:   T(F): 97.1 (20 @ 05:46), Max: 98.4 (20 @ 07:37)  HR: 67 (20 @ 05:46) (60 - 67)  BP: 141/85 (20 @ 05:46) (130/62 - 159/72)  RR: 18 (20 @ 05:46) (18 - 18)  SpO2: 99% (20 @ 15:44) (99% - 99%)      20 @ 07:01  -  20 @ 06:12  --------------------------------------------------------  IN: 150 mL / OUT: 0 mL / NET: 150 mL    Physical Exam:   GENERAL: NAD  HEENT: NCAT  CHEST/LUNG: CTAB  HEART: Regular rate and rhythm; s1 s2 appreciated, No murmurs, rubs, or gallops  ABDOMEN: Soft, Nontender, Nondistended; Bowel sounds present  EXTREMITIES: No LE edema b/l  SKIN: no rashes, no new lesions  NERVOUS SYSTEM:  Alert & Oriented X3  LINES/CATHETERS:    Labs:                         11.9   5.98  )-----------( 74       ( 23 Aug 2020 05:51 )             36.6       23 Aug 2020 05:51    141    |  102    |  12     ----------------------------<  104    4.1     |  29     |  0.6      Ca    8.6        23 Aug 2020 05:51  Mg     2.0       22 Aug 2020 21:35    TPro  7.8    /  Alb  3.4    /  TBili  0.4    /  DBili  <0.2   /  AST  77     /  ALT  75     /  AlkPhos  71     23 Aug 2020 05:51    Amylase --, Lipase 56, 20 @ 21:35  Amylase --, Lipase 61, 20 @ 20:11    Troponin <0.01, CKMB --, CK -- 20 @ 05:51  Troponin <0.01, CKMB --, CK -- 20 @ 20:11    Iron 81, TIBC 286, %Sat 28 Ferritin -- 20 @ 11:34    Urinalysis Basic - ( 22 Aug 2020 20:11 )    Color: Yellow / Appearance: Turbid / S.019 / pH: x  Gluc: x / Ketone: Negative  / Bili: Negative / Urobili: 3 mg/dL   Blood: x / Protein: 30 mg/dL / Nitrite: Negative   Leuk Esterase: Large / RBC: 3 /HPF /  /HPF   Sq Epi: x / Non Sq Epi: 1 /HPF / Bacteria: Many    Radiology:  < from: US Abdomen Limited (20 @ 10:46) >  No abdominopelvic ascites.    Cirrhotic liver with patent portal and hepatic veins.    < end of copied text >  < from: CT Head No Cont (20 @ 23:12) >    Impression:      No evidence of intracranial hemorrhage, territorial infarct, or mass effect.      < end of copied text >  < from: CT Abdomen and Pelvis w/ IV Cont (20 @ 23:20) >    Large colonic stool burden and fecalization of the distal small bowel which may reflect a clinical diagnosis of constipation or functional bowel disease.    Cirrhotic changes with evidence of portal hypertension. No evidence of portal vein thrombosis.    Nonspecific enlarged portacaval nodes are likely reactive.    < end of copied text >  < from: US Transvaginal (20 @ 22:08) >    IMPRESSION:    Broad-based 5.1 x 4.3 x 4.2 cm subserosal fibroid in the posterior uterine corpus. The uterus is otherwise unremarkable.    Nonvisualization of the ovaries.    < end of copied text >    Assessment and Plan:   53 yr F with HTN, anxiety and uterine fibroids, Hx of drugs abuse, depression and frequents [facility] admissions was bought in for evaluation of syncope and abd pain.    # Abd pain for 3 months with vaginal foul smelling and weight gain:  - Dx severe chronic constipation, fibroid mass, ovarian mass (but no evidence in CT, send markers)  - start Miralax, lactulose and senna  - although no evidence of sepsis, +ve UA and fever at home, start Rocephin  - start miconazole for suspected vaginitis,  - check TSH, f/u Ucx , Chlamydia and gonorrhea     #Syncope: CTH negative, normal EKG, could be from UTI or severe pain   - troponin negative  - check orthostatics and TSH    #liver cirrhosis with portal HTN on CT due to previous Hep C ?? VS MAI  - no biochemical evidence of cirrhosis except thrombocytopenia   - check COAG  - transaminitis 102/88: noted from previous labs numbers now better  - needs evaluation of chronic liver dz, Hx of drug abuse, from [facility]: Hep C qualitatives RNA +ve, will send PCR  - Hold Lipitor     #HTN:  - losartan, HTZC and metoprolol     #depression abd anxiety  - Seroquel and escitalopram     #Hx of drug abuse  - Suboxone  - send Utox     #Diet: full diet  #DVT pro: Lovenox  #GI pro: PPI  #Dispo: from home Hospital Day:  1d    Chief Complaint: Patient is a 55y old  Female who presents with a chief complaint of syncope (23 Aug 2020 07:26)    24 hour events: No acute overnight/ Patient seen resting comfortably this AM, she does not endorse any new complaints.     Past Medical Hx:   Hypertension  Panic attacks  Depression  Anxiety    Past Sx:  H/O:   History of cholecystectomy    Allergies:  Benadryl (Unknown)    Current Meds:   Standing Meds:  buprenorphine 8 mG/naloxone 2 mG SL  Tablet 1 Tablet(s) SubLingual daily  cefTRIAXone   IVPB 1000 milliGRAM(s) IV Intermittent every 24 hours  enoxaparin Injectable 40 milliGRAM(s) SubCutaneous at bedtime  escitalopram 10 milliGRAM(s) Oral daily  gabapentin 100 milliGRAM(s) Oral three times a day  hydrochlorothiazide 12.5 milliGRAM(s) Oral daily  lactulose Syrup 30 Gram(s) Oral every 8 hours  losartan 100 milliGRAM(s) Oral daily  metoprolol tartrate 100 milliGRAM(s) Oral two times a day  metroNIDAZOLE  IVPB 500 milliGRAM(s) IV Intermittent every 12 hours  pantoprazole    Tablet 40 milliGRAM(s) Oral before breakfast  polyethylene glycol 3350 17 Gram(s) Oral daily  QUEtiapine Oral Tab/Cap - Peds 100 milliGRAM(s) Oral two times a day  senna 2 Tablet(s) Oral at bedtime    PRN Meds:    HOME MEDICATIONS:  buprenorphine-naloxone 8 mg-2 mg sublingual tablet: 1 tab sublingual 2 times a day  gabapentin 100 mg oral capsule: 1 cap(s) orally 3 times a day  hydroCHLOROthiazide 12.5 mg oral capsule: 1 cap(s) orally once a day  losartan 100 mg oral tablet: 1 tab(s) orally once a day  Metoprolol Tartrate 100 mg oral tablet: 1 tab(s) orally 2 times a day  omeprazole 20 mg oral delayed release capsule: 1 cap(s) orally once a day  SEROquel 100 mg oral tablet: 1 tab(s) orally 2 times a day  simvastatin 40 mg oral tablet: 1 tab(s) orally once a day (at bedtime)      Vital Signs:   T(F): 97.1 (20 @ 05:46), Max: 98.4 (20 @ 07:37)  HR: 67 (20 @ 05:46) (60 - 67)  BP: 141/85 (20 @ 05:46) (130/62 - 159/72)  RR: 18 (20 @ 05:46) (18 - 18)  SpO2: 99% (20 @ 15:44) (99% - 99%)      20 @ 07:01  -  20 @ 06:12  --------------------------------------------------------  IN: 150 mL / OUT: 0 mL / NET: 150 mL    Physical Exam:   GENERAL: well nourished well developed female in NAD  HEENT: NCAT  CHEST/LUNG: audible breath sounds b/l  HEART: Regular rate and rhythm; s1 s2 appreciated  ABDOMEN: Obese, distended, mildly tender abdomen   EXTREMITIES: No LE edema b/l  NERVOUS SYSTEM:  Alert & Oriented X3    Labs:                         11.9   5.98  )-----------( 74       ( 23 Aug 2020 05:51 )             36.6       23 Aug 2020 05:51    141    |  102    |  12     ----------------------------<  104    4.1     |  29     |  0.6      Ca    8.6        23 Aug 2020 05:51  Mg     2.0       22 Aug 2020 21:35    TPro  7.8    /  Alb  3.4    /  TBili  0.4    /  DBili  <0.2   /  AST  77     /  ALT  75     /  AlkPhos  71     23 Aug 2020 05:51    Amylase --, Lipase 56, 20 @ 21:35  Amylase --, Lipase 61, 20 @ 20:11    Troponin <0.01, CKMB --, CK -- 20 @ 05:51  Troponin <0.01, CKMB --, CK -- 20 @ 20:11    Iron 81, TIBC 286, %Sat 28 Ferritin -- 20 @ 11:34    Urinalysis Basic - ( 22 Aug 2020 20:11 )    Color: Yellow / Appearance: Turbid / S.019 / pH: x  Gluc: x / Ketone: Negative  / Bili: Negative / Urobili: 3 mg/dL   Blood: x / Protein: 30 mg/dL / Nitrite: Negative   Leuk Esterase: Large / RBC: 3 /HPF /  /HPF   Sq Epi: x / Non Sq Epi: 1 /HPF / Bacteria: Many    Radiology:  < from: US Abdomen Limited (20 @ 10:46) >  No abdominopelvic ascites.    Cirrhotic liver with patent portal and hepatic veins.    < end of copied text >  < from: CT Head No Cont (20 @ 23:12) >    Impression:      No evidence of intracranial hemorrhage, territorial infarct, or mass effect.      < end of copied text >  < from: CT Abdomen and Pelvis w/ IV Cont (20 @ 23:20) >    Large colonic stool burden and fecalization of the distal small bowel which may reflect a clinical diagnosis of constipation or functional bowel disease.    Cirrhotic changes with evidence of portal hypertension. No evidence of portal vein thrombosis.    Nonspecific enlarged portacaval nodes are likely reactive.    < end of copied text >  < from: US Transvaginal (20 @ 22:08) >    IMPRESSION:    Broad-based 5.1 x 4.3 x 4.2 cm subserosal fibroid in the posterior uterine corpus. The uterus is otherwise unremarkable.    Nonvisualization of the ovaries.    < end of copied text >    Assessment and Plan:   53 yr F with HTN, anxiety and uterine fibroids, Hx of drugs abuse, depression and frequents [facility] admissions was bought in for evaluation of syncope and abd pain.    # Abd pain for 3 months with vaginal foul smelling and weight gain:  - Dx severe chronic constipation, fibroid mass, ovarian mass (but no evidence in CT, send markers)  - significant stool burden on CT   - continue with miralax, lactulose, and senna  - add docusate  - tap water enemas q4h   - although no evidence of sepsis, +ve UA and fever at home, continue with rocephin  - UCx shows >100,000 CFU GNR  - check TSH    #Syncope: CTH negative, normal EKG, could be from UTI or severe pain   - troponin negative  - orthostatics negative  - Follow up TSH    #liver cirrhosis with portal HTN on CT due to previous Hep C ?? VS MAI  - no biochemical evidence of cirrhosis except thrombocytopenia   - check COAG  - transaminitis 102/88: noted from previous labs numbers now better  - needs evaluation of chronic liver dz, Hx of drug abuse, from [facility]: Hep C qualitatives RNA +ve, will send PCR  - Hold Lipitor     #HTN:  - losartan, HTZC and metoprolol     #depression abd anxiety  - Seroquel and escitalopram     #Hx of drug abuse  - Suboxone  - send Utox     #Diet: full diet  #DVT pro: Lovenox  #GI pro: PPI  #Dispo: from home Hospital Day:  1d    Chief Complaint: Patient is a 55y old  Female who presents with a chief complaint of syncope (23 Aug 2020 07:26)    24 hour events: No acute overnight/ Patient seen resting comfortably this AM, she does not endorse any new complaints.     Past Medical Hx:   Hypertension  Panic attacks  Depression  Anxiety    Past Sx:  H/O:   History of cholecystectomy    Allergies:  Benadryl (Unknown)    Current Meds:   Standing Meds:  buprenorphine 8 mG/naloxone 2 mG SL  Tablet 1 Tablet(s) SubLingual daily  cefTRIAXone   IVPB 1000 milliGRAM(s) IV Intermittent every 24 hours  enoxaparin Injectable 40 milliGRAM(s) SubCutaneous at bedtime  escitalopram 10 milliGRAM(s) Oral daily  gabapentin 100 milliGRAM(s) Oral three times a day  hydrochlorothiazide 12.5 milliGRAM(s) Oral daily  lactulose Syrup 30 Gram(s) Oral every 8 hours  losartan 100 milliGRAM(s) Oral daily  metoprolol tartrate 100 milliGRAM(s) Oral two times a day  metroNIDAZOLE  IVPB 500 milliGRAM(s) IV Intermittent every 12 hours  pantoprazole    Tablet 40 milliGRAM(s) Oral before breakfast  polyethylene glycol 3350 17 Gram(s) Oral daily  QUEtiapine Oral Tab/Cap - Peds 100 milliGRAM(s) Oral two times a day  senna 2 Tablet(s) Oral at bedtime    PRN Meds:    HOME MEDICATIONS:  buprenorphine-naloxone 8 mg-2 mg sublingual tablet: 1 tab sublingual 2 times a day  gabapentin 100 mg oral capsule: 1 cap(s) orally 3 times a day  hydroCHLOROthiazide 12.5 mg oral capsule: 1 cap(s) orally once a day  losartan 100 mg oral tablet: 1 tab(s) orally once a day  Metoprolol Tartrate 100 mg oral tablet: 1 tab(s) orally 2 times a day  omeprazole 20 mg oral delayed release capsule: 1 cap(s) orally once a day  SEROquel 100 mg oral tablet: 1 tab(s) orally 2 times a day  simvastatin 40 mg oral tablet: 1 tab(s) orally once a day (at bedtime)      Vital Signs:   T(F): 97.1 (20 @ 05:46), Max: 98.4 (20 @ 07:37)  HR: 67 (20 @ 05:46) (60 - 67)  BP: 141/85 (20 @ 05:46) (130/62 - 159/72)  RR: 18 (20 @ 05:46) (18 - 18)  SpO2: 99% (20 @ 15:44) (99% - 99%)      20 @ 07:01  -  20 @ 06:12  --------------------------------------------------------  IN: 150 mL / OUT: 0 mL / NET: 150 mL    Physical Exam:   GENERAL: well nourished well developed female in NAD  HEENT: NCAT  CHEST/LUNG: audible breath sounds b/l  HEART: Regular rate and rhythm; s1 s2 appreciated  ABDOMEN: Obese, distended, mildly tender abdomen   EXTREMITIES: No LE edema b/l  NERVOUS SYSTEM:  Alert & Oriented X3    Labs:                         11.9   5.98  )-----------( 74       ( 23 Aug 2020 05:51 )             36.6       23 Aug 2020 05:51    141    |  102    |  12     ----------------------------<  104    4.1     |  29     |  0.6      Ca    8.6        23 Aug 2020 05:51  Mg     2.0       22 Aug 2020 21:35    TPro  7.8    /  Alb  3.4    /  TBili  0.4    /  DBili  <0.2   /  AST  77     /  ALT  75     /  AlkPhos  71     23 Aug 2020 05:51    Amylase --, Lipase 56, 20 @ 21:35  Amylase --, Lipase 61, 20 @ 20:11    Troponin <0.01, CKMB --, CK -- 20 @ 05:51  Troponin <0.01, CKMB --, CK -- 20 @ 20:11    Iron 81, TIBC 286, %Sat 28 Ferritin -- 20 @ 11:34    Urinalysis Basic - ( 22 Aug 2020 20:11 )    Color: Yellow / Appearance: Turbid / S.019 / pH: x  Gluc: x / Ketone: Negative  / Bili: Negative / Urobili: 3 mg/dL   Blood: x / Protein: 30 mg/dL / Nitrite: Negative   Leuk Esterase: Large / RBC: 3 /HPF /  /HPF   Sq Epi: x / Non Sq Epi: 1 /HPF / Bacteria: Many    Radiology:  < from: US Abdomen Limited (20 @ 10:46) >  No abdominopelvic ascites.    Cirrhotic liver with patent portal and hepatic veins.    < end of copied text >  < from: CT Head No Cont (20 @ 23:12) >    Impression:      No evidence of intracranial hemorrhage, territorial infarct, or mass effect.      < end of copied text >  < from: CT Abdomen and Pelvis w/ IV Cont (20 @ 23:20) >    Large colonic stool burden and fecalization of the distal small bowel which may reflect a clinical diagnosis of constipation or functional bowel disease.    Cirrhotic changes with evidence of portal hypertension. No evidence of portal vein thrombosis.    Nonspecific enlarged portacaval nodes are likely reactive.    < end of copied text >  < from: US Transvaginal (20 @ 22:08) >    IMPRESSION:    Broad-based 5.1 x 4.3 x 4.2 cm subserosal fibroid in the posterior uterine corpus. The uterus is otherwise unremarkable.    Nonvisualization of the ovaries.    < end of copied text >    Assessment and Plan:   53 yr F with HTN, anxiety and uterine fibroids, Hx of drugs abuse, depression and frequents [facility] admissions was bought in for evaluation of syncope and abd pain.    # Abd pain for 3 months with vaginal foul smelling and weight gain:  - Dx severe chronic constipation, fibroid mass, ovarian mass (but no evidence in CT, send markers)  - significant stool burden on CT   - continue with miralax, lactulose, and senna  - add docusate  - tap water enemas q4h   - although no evidence of sepsis, +ve UA and fever at home, continue with rocephin  - UCx shows >100,000 CFU GNR  - check TSH  - patient recommended endometrial sampling by GYN, patient refused for now, will reevaluate decision tomorrow.     #Syncope: CTH negative, normal EKG, could be from UTI or severe pain   - troponin negative  - orthostatics negative  - Follow up TSH    # Liver cirrhosis with portal HTN on CT due to previous Hep C?? VS MAI  - no biochemical evidence of cirrhosis except thrombocytopenia   - transaminitis 102/88: noted from previous labs numbers now better  - Follow up Hepatitis panel , HBV PCR , HCV PCR , BRIAN , ASMA , AMA , ANTI LKM, ceruloplasmin , IGG levels , alpha one anti trypsin   - Hold Lipitor    #HTN:  - losartan, HTZC and metoprolol     # depression abd anxiety  - Seroquel and escitalopram     # Hx of drug abuse  - Suboxone  - send Utox     #Diet: full diet  #DVT pro: Lovenox  #GI pro: PPI  #Dispo: possible d/c home

## 2020-08-24 NOTE — PROGRESS NOTE ADULT - ASSESSMENT
53 yr F with HTN, anxiety and uterine fibroids, Hx of drugs abuse, previous Hep C ? depression and frequents [facility] admissions was bought in for evaluation of syncope and abd pain. GI consulted for initial workup showing liver cirrhosis with portal hypertension , and for constipation.      #Liver cirrhosis pf unknown etiology  : no signs of decompensated liver disease / transaminitis of hepatocellular pattern   r/o MAI versus others   No encephalopathy    No previous history of GI bleed   No ascites of CT abdomen and pelvis or US abdomen   Patent hepatic vasculature     REC:   check INR   CLD work up :   Hepatitis panel , HBV PCR , HCV PCR , BRIAN , ASMA , AMA , ANTI LKM , , ceruloplasmin , IGG levels , alpha one anti trypsin   patient will require EGD for variceal screening as outpatient   US abdomen and alpha fetoprotein q 6 months for HCC screening     #constipation :   no obstruction on CT abdomen and pelvis   REC:   tap water enema QID   Miralax BID  senna 2 tabs daily     #weight loss:   EGD/ colon as outpatient     Follow up in hepatology clinic [phone]

## 2020-08-24 NOTE — PROGRESS NOTE ADULT - SUBJECTIVE AND OBJECTIVE BOX
We are following the patient for Cirrhosis      GI HPI Today:  No overnight events   No fever   Hemodynamically stable     PAST MEDICAL & SURGICAL HISTORY  Hypertension  Panic attacks  Depression  Anxiety  H/O:   History of cholecystectomy      ALLERGIES:  Benadryl (Unknown)      MEDICATIONS:  MEDICATIONS  (STANDING):  buprenorphine 8 mG/naloxone 2 mG SL  Tablet 1 Tablet(s) SubLingual daily  cefTRIAXone   IVPB 1000 milliGRAM(s) IV Intermittent every 24 hours  enoxaparin Injectable 40 milliGRAM(s) SubCutaneous at bedtime  escitalopram 10 milliGRAM(s) Oral daily  gabapentin 100 milliGRAM(s) Oral three times a day  hydrochlorothiazide 12.5 milliGRAM(s) Oral daily  lactulose Syrup 30 Gram(s) Oral every 8 hours  losartan 100 milliGRAM(s) Oral daily  metoprolol tartrate 100 milliGRAM(s) Oral two times a day  metroNIDAZOLE  IVPB 500 milliGRAM(s) IV Intermittent every 12 hours  pantoprazole    Tablet 40 milliGRAM(s) Oral before breakfast  polyethylene glycol 3350 17 Gram(s) Oral daily  QUEtiapine Oral Tab/Cap - Peds 100 milliGRAM(s) Oral two times a day  senna 2 Tablet(s) Oral at bedtime    MEDICATIONS  (PRN):      REVIEW OF SYSTEMS  General:  No fevers  Eyes:  No reported pain   ENT:  No sore throat   NECK: No stiffness   CV:  No chest pain   Resp:  No shortness of breath  GI:  See HPI  :  No dysuria  Muscle:  No weakness  Neuro:  No tingling  Endocrine:  No polyuria  Heme:  No ecchymosis        VITALS:   T(F): 97.1 ( @ 05:46), Max: 98.9 ( @ 19:19)  HR: 67 ( @ 05:46) (60 - 83)  BP: 141/85 ( @ 05:46) (116/60 - 159/72)  BP(mean): --  RR: 18 ( @ 05:46) (18 - 20)  SpO2: 99% ( @ 15:44) (98% - 99%)        PHYSICAL EXAM:  GENERAL:  Appears in no distress  HEENT:  Conjunctivae Anicteric   CHEST:  Full & symmetric excursion  HEART:  NS1, S2,   ABDOMEN:  Soft, non-tender, ++distended  EXTEREMITIES:  no edema  SKIN:  No rash  NEURO:  Alert         Blood Work :                        12.8   6.00  )-----------( 69       ( 24 Aug 2020 06:05 )             38.7     PT/INR - ( 24 Aug 2020 06:05 )  INR: 1.34          PTT - ( 24 Aug 2020 06:05 )  PTT:46.7<H>  08    140  |  103  |  8<L>  ----------------------------<  80  4.2   |  29  |  0.6<L>    Ca    8.6      24 Aug 2020 06:05  Mg     1.8     08-      CBC -  ( 24 Aug 2020 06:05 )  Hemoglobin : 12.8    CBC -  ( 23 Aug 2020 05:51 )  Hemoglobin : 11.9    CBC -  ( 22 Aug 2020 21:35 )  Hemoglobin : 12.1    CBC -  ( 22 Aug 2020 20:11 )  Hemoglobin : 12.8      LIVER FUNCTIONS - ( 24 Aug 2020 06:05 )  Alb: 3.4 [3.5 - 5.2] / Pro: 8.2 [6.0 - 8.0] / ALK PHOS: 70 [30 - 115] / ALT: 76 [0 - 41] / AST: 83 [0 - 41] / GGT: x     LIVER FUNCTIONS - ( 23 Aug 2020 05:51 )  Alb: 3.4 [3.5 - 5.2] / Pro: 7.8 [6.0 - 8.0] / ALK PHOS: 71 [30 - 115] / ALT: 75 [0 - 41] / AST: 77 [0 - 41] / GGT: x     LIVER FUNCTIONS - ( 22 Aug 2020 21:35 )  Alb: 3.5 [3.5 - 5.2] / Pro: 8.2 [6.0 - 8.0] / ALK PHOS: 71 [30 - 115] / ALT: 81 [0 - 41] / AST: 88 [0 - 41] / GGT: x     LIVER FUNCTIONS - ( 22 Aug 2020 20:11 )  Alb: 3.9 [3.5 - 5.2] / Pro: 9.0 [6.0 - 8.0] / ALK PHOS: 70 [30 - 115] / ALT: 88 [0 - 41] / AST: 102 [0 - 41] / GGT: x         RADIOLOGY:  < from: US Abdomen Limited (20 @ 10:46) >    EXAM:  US ABDOMEN LIMITED            PROCEDURE DATE:  2020            INTERPRETATION:  CLINICAL HISTORY: Evaluate for ascites..    Correlation is made with CT abdomen and pelvis 2020.    PROCEDURE: Right upper quadrant sonogram including Doppler imaging of the hepatic veins was performed. Additional 4 quadrant abdominal ultrasound was performed to evaluate for ascites.    FINDINGS:    LIVER: Cirrhotic liver with patent portal and hepatic veins.    No abdominopelvic ascites visualized.    IMPRESSION:    No abdominopelvic ascites.    Cirrhotic liver with patent portal and hepatic veins.        < end of copied text >

## 2020-08-25 ENCOUNTER — TRANSCRIPTION ENCOUNTER (OUTPATIENT)
Age: 55
End: 2020-08-25

## 2020-08-25 ENCOUNTER — RESULT REVIEW (OUTPATIENT)
Age: 55
End: 2020-08-25

## 2020-08-25 LAB
-  AMIKACIN: SIGNIFICANT CHANGE UP
-  AMOXICILLIN/CLAVULANIC ACID: SIGNIFICANT CHANGE UP
-  AMPICILLIN/SULBACTAM: SIGNIFICANT CHANGE UP
-  AMPICILLIN: SIGNIFICANT CHANGE UP
-  AZTREONAM: SIGNIFICANT CHANGE UP
-  CEFAZOLIN: SIGNIFICANT CHANGE UP
-  CEFEPIME: SIGNIFICANT CHANGE UP
-  CEFOXITIN: SIGNIFICANT CHANGE UP
-  CEFTRIAXONE: SIGNIFICANT CHANGE UP
-  CIPROFLOXACIN: SIGNIFICANT CHANGE UP
-  ERTAPENEM: SIGNIFICANT CHANGE UP
-  GENTAMICIN: SIGNIFICANT CHANGE UP
-  IMIPENEM: SIGNIFICANT CHANGE UP
-  LEVOFLOXACIN: SIGNIFICANT CHANGE UP
-  MEROPENEM: SIGNIFICANT CHANGE UP
-  NITROFURANTOIN: SIGNIFICANT CHANGE UP
-  PIPERACILLIN/TAZOBACTAM: SIGNIFICANT CHANGE UP
-  TIGECYCLINE: SIGNIFICANT CHANGE UP
-  TOBRAMYCIN: SIGNIFICANT CHANGE UP
-  TRIMETHOPRIM/SULFAMETHOXAZOLE: SIGNIFICANT CHANGE UP
ALBUMIN SERPL ELPH-MCNC: 3.1 G/DL — LOW (ref 3.5–5.2)
ALP SERPL-CCNC: 66 U/L — SIGNIFICANT CHANGE UP (ref 30–115)
ALT FLD-CCNC: 69 U/L — HIGH (ref 0–41)
ANA PAT FLD IF-IMP: ABNORMAL
ANA TITR SER: ABNORMAL
ANION GAP SERPL CALC-SCNC: 8 MMOL/L — SIGNIFICANT CHANGE UP (ref 7–14)
AST SERPL-CCNC: 80 U/L — HIGH (ref 0–41)
BILIRUB SERPL-MCNC: 0.5 MG/DL — SIGNIFICANT CHANGE UP (ref 0.2–1.2)
BUN SERPL-MCNC: 8 MG/DL — LOW (ref 10–20)
C TRACH RRNA SPEC QL NAA+PROBE: SIGNIFICANT CHANGE UP
CALCIUM SERPL-MCNC: 8.2 MG/DL — LOW (ref 8.5–10.1)
CHLORIDE SERPL-SCNC: 103 MMOL/L — SIGNIFICANT CHANGE UP (ref 98–110)
CO2 SERPL-SCNC: 28 MMOL/L — SIGNIFICANT CHANGE UP (ref 17–32)
CREAT SERPL-MCNC: 0.6 MG/DL — LOW (ref 0.7–1.5)
GLUCOSE SERPL-MCNC: 84 MG/DL — SIGNIFICANT CHANGE UP (ref 70–99)
HAV IGM SER-ACNC: SIGNIFICANT CHANGE UP
HBV CORE IGM SER-ACNC: SIGNIFICANT CHANGE UP
HBV SURFACE AG SER-ACNC: SIGNIFICANT CHANGE UP
HCT VFR BLD CALC: 35.1 % — LOW (ref 37–47)
HCV AB S/CO SERPL IA: 15.06 S/CO — HIGH (ref 0–0.99)
HCV AB SERPL-IMP: REACTIVE
HGB BLD-MCNC: 11.5 G/DL — LOW (ref 12–16)
MAGNESIUM SERPL-MCNC: 1.6 MG/DL — LOW (ref 1.8–2.4)
MCHC RBC-ENTMCNC: 31.6 PG — HIGH (ref 27–31)
MCHC RBC-ENTMCNC: 32.8 G/DL — SIGNIFICANT CHANGE UP (ref 32–37)
MCV RBC AUTO: 96.4 FL — SIGNIFICANT CHANGE UP (ref 81–99)
METHOD TYPE: SIGNIFICANT CHANGE UP
MITOCHONDRIA AB SER-ACNC: SIGNIFICANT CHANGE UP
MITOCHONDRIA AB SER-ACNC: SIGNIFICANT CHANGE UP
N GONORRHOEA RRNA SPEC QL NAA+PROBE: SIGNIFICANT CHANGE UP
NRBC # BLD: 0 /100 WBCS — SIGNIFICANT CHANGE UP (ref 0–0)
PLATELET # BLD AUTO: 63 K/UL — LOW (ref 130–400)
POTASSIUM SERPL-MCNC: 4.1 MMOL/L — SIGNIFICANT CHANGE UP (ref 3.5–5)
POTASSIUM SERPL-SCNC: 4.1 MMOL/L — SIGNIFICANT CHANGE UP (ref 3.5–5)
PROT SERPL-MCNC: 7.3 G/DL — SIGNIFICANT CHANGE UP (ref 6–8)
RBC # BLD: 3.64 M/UL — LOW (ref 4.2–5.4)
RBC # FLD: 14.3 % — SIGNIFICANT CHANGE UP (ref 11.5–14.5)
SMOOTH MUSCLE AB SER-ACNC: SIGNIFICANT CHANGE UP
SODIUM SERPL-SCNC: 139 MMOL/L — SIGNIFICANT CHANGE UP (ref 135–146)
SPECIMEN SOURCE: SIGNIFICANT CHANGE UP
WBC # BLD: 5.24 K/UL — SIGNIFICANT CHANGE UP (ref 4.8–10.8)
WBC # FLD AUTO: 5.24 K/UL — SIGNIFICANT CHANGE UP (ref 4.8–10.8)

## 2020-08-25 PROCEDURE — 88305 TISSUE EXAM BY PATHOLOGIST: CPT | Mod: 26

## 2020-08-25 PROCEDURE — 99233 SBSQ HOSP IP/OBS HIGH 50: CPT

## 2020-08-25 PROCEDURE — 74018 RADEX ABDOMEN 1 VIEW: CPT | Mod: 26

## 2020-08-25 RX ORDER — MAGNESIUM SULFATE 500 MG/ML
2 VIAL (ML) INJECTION ONCE
Refills: 0 | Status: COMPLETED | OUTPATIENT
Start: 2020-08-25 | End: 2020-08-25

## 2020-08-25 RX ORDER — ACETAMINOPHEN 500 MG
650 TABLET ORAL ONCE
Refills: 0 | Status: COMPLETED | OUTPATIENT
Start: 2020-08-25 | End: 2020-08-25

## 2020-08-25 RX ORDER — MINERAL OIL
133 OIL (ML) MISCELLANEOUS DAILY
Refills: 0 | Status: DISCONTINUED | OUTPATIENT
Start: 2020-08-25 | End: 2020-08-27

## 2020-08-25 RX ORDER — SOD SULF/SODIUM/NAHCO3/KCL/PEG
4000 SOLUTION, RECONSTITUTED, ORAL ORAL ONCE
Refills: 0 | Status: COMPLETED | OUTPATIENT
Start: 2020-08-25 | End: 2020-08-25

## 2020-08-25 RX ADMIN — ENOXAPARIN SODIUM 40 MILLIGRAM(S): 100 INJECTION SUBCUTANEOUS at 21:32

## 2020-08-25 RX ADMIN — Medication 50 GRAM(S): at 13:05

## 2020-08-25 RX ADMIN — Medication 100 MILLIGRAM(S): at 05:54

## 2020-08-25 RX ADMIN — GABAPENTIN 100 MILLIGRAM(S): 400 CAPSULE ORAL at 05:57

## 2020-08-25 RX ADMIN — CEFTRIAXONE 100 MILLIGRAM(S): 500 INJECTION, POWDER, FOR SOLUTION INTRAMUSCULAR; INTRAVENOUS at 01:01

## 2020-08-25 RX ADMIN — QUETIAPINE FUMARATE 100 MILLIGRAM(S): 200 TABLET, FILM COATED ORAL at 17:01

## 2020-08-25 RX ADMIN — BUPRENORPHINE AND NALOXONE 1 TABLET(S): 2; .5 TABLET SUBLINGUAL at 13:05

## 2020-08-25 RX ADMIN — Medication 12.5 MILLIGRAM(S): at 05:57

## 2020-08-25 RX ADMIN — Medication 133 MILLILITER(S): at 11:12

## 2020-08-25 RX ADMIN — LOSARTAN POTASSIUM 100 MILLIGRAM(S): 100 TABLET, FILM COATED ORAL at 05:57

## 2020-08-25 RX ADMIN — ESCITALOPRAM OXALATE 10 MILLIGRAM(S): 10 TABLET, FILM COATED ORAL at 13:06

## 2020-08-25 RX ADMIN — PANTOPRAZOLE SODIUM 40 MILLIGRAM(S): 20 TABLET, DELAYED RELEASE ORAL at 06:19

## 2020-08-25 RX ADMIN — LACTULOSE 30 GRAM(S): 10 SOLUTION ORAL at 13:07

## 2020-08-25 RX ADMIN — Medication 4000 MILLILITER(S): at 16:13

## 2020-08-25 RX ADMIN — GABAPENTIN 100 MILLIGRAM(S): 400 CAPSULE ORAL at 21:32

## 2020-08-25 RX ADMIN — Medication 650 MILLIGRAM(S): at 21:42

## 2020-08-25 RX ADMIN — GABAPENTIN 100 MILLIGRAM(S): 400 CAPSULE ORAL at 13:06

## 2020-08-25 RX ADMIN — Medication 100 MILLIGRAM(S): at 17:01

## 2020-08-25 RX ADMIN — QUETIAPINE FUMARATE 100 MILLIGRAM(S): 200 TABLET, FILM COATED ORAL at 05:57

## 2020-08-25 NOTE — PROGRESS NOTE ADULT - SUBJECTIVE AND OBJECTIVE BOX
Hospital Day:  2d    Chief Complaint: Patient is a 55y old  Female who presents with a chief complaint of syncope (24 Aug 2020 11:07)    24 hour events: No acute overnight events.     Past Medical Hx:   Hypertension  Panic attacks  Depression  Anxiety    Past Sx:  H/O:   History of cholecystectomy    Allergies:  Benadryl (Unknown)    Current Meds:   Standing Meds:  bisacodyl 5 milliGRAM(s) Oral at bedtime  buprenorphine 8 mG/naloxone 2 mG SL  Tablet 1 Tablet(s) SubLingual daily  cefTRIAXone   IVPB 1000 milliGRAM(s) IV Intermittent every 24 hours  enoxaparin Injectable 40 milliGRAM(s) SubCutaneous at bedtime  escitalopram 10 milliGRAM(s) Oral daily  gabapentin 100 milliGRAM(s) Oral three times a day  hydrochlorothiazide 12.5 milliGRAM(s) Oral daily  lactulose Syrup 30 Gram(s) Oral every 8 hours  losartan 100 milliGRAM(s) Oral daily  metoprolol tartrate 100 milliGRAM(s) Oral two times a day  metroNIDAZOLE  IVPB 500 milliGRAM(s) IV Intermittent every 12 hours  pantoprazole    Tablet 40 milliGRAM(s) Oral before breakfast  polyethylene glycol 3350 17 Gram(s) Oral two times a day  QUEtiapine Oral Tab/Cap - Peds 100 milliGRAM(s) Oral two times a day  senna 2 Tablet(s) Oral at bedtime    PRN Meds:    HOME MEDICATIONS:  buprenorphine-naloxone 8 mg-2 mg sublingual tablet: 1 tab sublingual 2 times a day  gabapentin 100 mg oral capsule: 1 cap(s) orally 3 times a day  hydroCHLOROthiazide 12.5 mg oral capsule: 1 cap(s) orally once a day  losartan 100 mg oral tablet: 1 tab(s) orally once a day  Metoprolol Tartrate 100 mg oral tablet: 1 tab(s) orally 2 times a day  omeprazole 20 mg oral delayed release capsule: 1 cap(s) orally once a day  SEROquel 100 mg oral tablet: 1 tab(s) orally 2 times a day  simvastatin 40 mg oral tablet: 1 tab(s) orally once a day (at bedtime)      Vital Signs:   T(F): 97.6 (20 @ 05:28), Max: 97.9 (20 @ 14:05)  HR: 53 (20 @ 05:40) (53 - 64)  BP: 133/65 (20 @ 05:28) (121/56 - 134/65)  RR: 18 (20 @ 05:40) (18 - 18)  SpO2: 98% (20 @ 20:20) (98% - 98%)      20 @ 07:01  -  20 @ 07:00  --------------------------------------------------------  IN: 150 mL / OUT: 0 mL / NET: 150 mL    20 @ 07:01  -  20 @ 06:16  --------------------------------------------------------  IN: 500 mL / OUT: 300 mL / NET: 200 mL    Physical Exam:   GENERAL: NAD  HEENT: NCAT  CHEST/LUNG: CTAB  HEART: Regular rate and rhythm; s1 s2 appreciated, No murmurs, rubs, or gallops  ABDOMEN: Soft, Nontender, Nondistended; Bowel sounds present  EXTREMITIES: No LE edema b/l  SKIN: no rashes, no new lesions  NERVOUS SYSTEM:  Alert & Oriented X3  LINES/CATHETERS:    Labs:                         12.8   6.00  )-----------( 69       ( 24 Aug 2020 06:05 )             38.7       24 Aug 2020 06:05    140    |  103    |  8      ----------------------------<  80     4.2     |  29     |  0.6      Ca    8.6        24 Aug 2020 06:05  Mg     1.8       24 Aug 2020 06:05    TPro  8.2    /  Alb  3.4    /  TBili  0.9    /  DBili  x      /  AST  83     /  ALT  76     /  AlkPhos  70     24 Aug 2020 06:05    Amylase --, Lipase 56, 20 @ 21:35  Amylase --, Lipase 61, 08-22-20 @ 20:11    Troponin <0.01, CKMB --, CK -- 20 @ 05:51  Troponin <0.01, CKMB --, CK -- 20 @ 20:11    Iron 81, TIBC 286, %Sat 28 Ferritin 193 20 @ 11:34      Urinalysis Basic - ( 22 Aug 2020 20:11 )    Color: Yellow / Appearance: Turbid / S.019 / pH: x  Gluc: x / Ketone: Negative  / Bili: Negative / Urobili: 3 mg/dL   Blood: x / Protein: 30 mg/dL / Nitrite: Negative   Leuk Esterase: Large / RBC: 3 /HPF /  /HPF   Sq Epi: x / Non Sq Epi: 1 /HPF / Bacteria: Many    Culture - Blood (collected 20 @ 05:51)  Source: .Blood None  Preliminary Report (20 @ 12:02):    No growth to date.    Radiology:     Assessment and Plan:   53 yr F with HTN, anxiety and uterine fibroids, Hx of drugs abuse, depression and frequents [facility] admissions was bought in for evaluation of syncope and abd pain.    # Abd pain for 3 months with vaginal foul smelling and weight gain:  - Dx severe chronic constipation, fibroid mass, ovarian mass (but no evidence in CT, send markers)  - significant stool burden on CT   - continue with miralax, lactulose, and senna   - tap water enemas q4h   - although no evidence of sepsis, +ve UA and fever at home, continue with rocephin  - UCx shows >100,000 CFU GNR  - check TSH  - patient recommended endometrial sampling by GYN, patient refused for now, will reevaluate decision tomorrow.     #Syncope: CTH negative, normal EKG, could be from UTI or severe pain   - troponin negative  - orthostatics negative  - Follow up TSH    # Liver cirrhosis with portal HTN on CT due to previous Hep C?? VS AMI  - no biochemical evidence of cirrhosis except thrombocytopenia   - transaminitis 102/88: noted from previous labs numbers now better  - Follow up Hepatitis panel , HBV PCR , HCV PCR , BRIAN , ASMA , AMA , ANTI LKM, ceruloplasmin , IGG levels , alpha one anti trypsin   - Hold Lipitor    #HTN:  - losartan, HTZC and metoprolol     # depression abd anxiety  - Seroquel and escitalopram     # Hx of drug abuse  - continue with Suboxone  - send Utox     #Diet: full diet  #DVT pro: Lovenox  #GI pro: PPI  #Dispo: possible d/c home Hospital Day:  2d    Chief Complaint: Patient is a 55y old  Female who presents with a chief complaint of syncope (24 Aug 2020 11:07)    24 hour events: No acute overnight events. Patient seen this AM very     Past Medical Hx:   Hypertension  Panic attacks  Depression  Anxiety    Past Sx:  H/O:   History of cholecystectomy    Allergies:  Benadryl (Unknown)    Current Meds:   Standing Meds:  bisacodyl 5 milliGRAM(s) Oral at bedtime  buprenorphine 8 mG/naloxone 2 mG SL  Tablet 1 Tablet(s) SubLingual daily  cefTRIAXone   IVPB 1000 milliGRAM(s) IV Intermittent every 24 hours  enoxaparin Injectable 40 milliGRAM(s) SubCutaneous at bedtime  escitalopram 10 milliGRAM(s) Oral daily  gabapentin 100 milliGRAM(s) Oral three times a day  hydrochlorothiazide 12.5 milliGRAM(s) Oral daily  lactulose Syrup 30 Gram(s) Oral every 8 hours  losartan 100 milliGRAM(s) Oral daily  metoprolol tartrate 100 milliGRAM(s) Oral two times a day  metroNIDAZOLE  IVPB 500 milliGRAM(s) IV Intermittent every 12 hours  pantoprazole    Tablet 40 milliGRAM(s) Oral before breakfast  polyethylene glycol 3350 17 Gram(s) Oral two times a day  QUEtiapine Oral Tab/Cap - Peds 100 milliGRAM(s) Oral two times a day  senna 2 Tablet(s) Oral at bedtime    PRN Meds:    HOME MEDICATIONS:  buprenorphine-naloxone 8 mg-2 mg sublingual tablet: 1 tab sublingual 2 times a day  gabapentin 100 mg oral capsule: 1 cap(s) orally 3 times a day  hydroCHLOROthiazide 12.5 mg oral capsule: 1 cap(s) orally once a day  losartan 100 mg oral tablet: 1 tab(s) orally once a day  Metoprolol Tartrate 100 mg oral tablet: 1 tab(s) orally 2 times a day  omeprazole 20 mg oral delayed release capsule: 1 cap(s) orally once a day  SEROquel 100 mg oral tablet: 1 tab(s) orally 2 times a day  simvastatin 40 mg oral tablet: 1 tab(s) orally once a day (at bedtime)      Vital Signs:   T(F): 97.6 (20 @ 05:28), Max: 97.9 (20 @ 14:05)  HR: 53 (20 @ 05:40) (53 - 64)  BP: 133/65 (20 @ 05:28) (121/56 - 134/65)  RR: 18 (20 @ 05:40) (18 - 18)  SpO2: 98% (20 @ 20:20) (98% - 98%)      20 @ 07:01  -  20 @ 07:00  --------------------------------------------------------  IN: 150 mL / OUT: 0 mL / NET: 150 mL    20 @ 07:01  -  20 @ 06:16  --------------------------------------------------------  IN: 500 mL / OUT: 300 mL / NET: 200 mL    Physical Exam:   GENERAL: NAD  HEENT: NCAT  CHEST/LUNG: CTAB  HEART: Regular rate and rhythm; s1 s2 appreciated, No murmurs, rubs, or gallops  ABDOMEN: Soft, Nontender, Nondistended; Bowel sounds present  EXTREMITIES: No LE edema b/l  SKIN: no rashes, no new lesions  NERVOUS SYSTEM:  Alert & Oriented X3  LINES/CATHETERS:    Labs:                         12.8   6.00  )-----------( 69       ( 24 Aug 2020 06:05 )             38.7       24 Aug 2020 06:05    140    |  103    |  8      ----------------------------<  80     4.2     |  29     |  0.6      Ca    8.6        24 Aug 2020 06:05  Mg     1.8       24 Aug 2020 06:05    TPro  8.2    /  Alb  3.4    /  TBili  0.9    /  DBili  x      /  AST  83     /  ALT  76     /  AlkPhos  70     24 Aug 2020 06:05    Amylase --, Lipase 56, 20 @ 21:35  Amylase --, Lipase 61, 20 @ 20:11    Troponin <0.01, CKMB --, CK -- 20 @ 05:51  Troponin <0.01, CKMB --, CK -- 20 @ 20:11    Iron 81, TIBC 286, %Sat 28 Ferritin 193 20 @ 11:34      Urinalysis Basic - ( 22 Aug 2020 20:11 )    Color: Yellow / Appearance: Turbid / S.019 / pH: x  Gluc: x / Ketone: Negative  / Bili: Negative / Urobili: 3 mg/dL   Blood: x / Protein: 30 mg/dL / Nitrite: Negative   Leuk Esterase: Large / RBC: 3 /HPF /  /HPF   Sq Epi: x / Non Sq Epi: 1 /HPF / Bacteria: Many    Culture - Blood (collected 20 @ 05:51)  Source: .Blood None  Preliminary Report (20 @ 12:02):    No growth to date.    Radiology:     Assessment and Plan:   53 yr F with HTN, anxiety and uterine fibroids, Hx of drugs abuse, depression and frequents [facility] admissions was bought in for evaluation of syncope and abd pain.    # Abd pain for 3 months with vaginal foul smelling and weight gain:  - Dx severe chronic constipation, fibroid mass, ovarian mass (but no evidence in CT, send markers)  - significant stool burden on CT   - continue with miralax, lactulose, and senna   - tap water enemas q4h   - although no evidence of sepsis, +ve UA and fever at home, continue with rocephin  - UCx shows >100,000 CFU GNR  - check TSH  - patient recommended endometrial sampling by GYN, patient refused for now, will reevaluate decision tomorrow.     #Syncope: CTH negative, normal EKG, could be from UTI or severe pain   - troponin negative  - orthostatics negative  - Follow up TSH    # Liver cirrhosis with portal HTN on CT due to previous Hep C?? VS MAI  - no biochemical evidence of cirrhosis except thrombocytopenia   - transaminitis 102/88: noted from previous labs numbers now better  - Follow up Hepatitis panel , HBV PCR , HCV PCR , BRIAN , ASMA , AMA , ANTI LKM, ceruloplasmin , IGG levels , alpha one anti trypsin   - Hold Lipitor    #HTN:  - losartan, HTZC and metoprolol     # depression abd anxiety  - Seroquel and escitalopram     # Hx of drug abuse  - continue with Suboxone  - send Utox     #Diet: full diet  #DVT pro: Lovenox  #GI pro: PPI  #Dispo: possible d/c home Hospital Day:  2d    Chief Complaint: Patient is a 55y old  Female who presents with a chief complaint of syncope (24 Aug 2020 11:07)    24 hour events: No acute overnight events. Patient seen this AM, resting comfortably in bed.     Past Medical Hx:   Hypertension  Panic attacks  Depression  Anxiety    Past Sx:  H/O:   History of cholecystectomy    Allergies:  Benadryl (Unknown)    Current Meds:   Standing Meds:  bisacodyl 5 milliGRAM(s) Oral at bedtime  buprenorphine 8 mG/naloxone 2 mG SL  Tablet 1 Tablet(s) SubLingual daily  cefTRIAXone   IVPB 1000 milliGRAM(s) IV Intermittent every 24 hours  enoxaparin Injectable 40 milliGRAM(s) SubCutaneous at bedtime  escitalopram 10 milliGRAM(s) Oral daily  gabapentin 100 milliGRAM(s) Oral three times a day  hydrochlorothiazide 12.5 milliGRAM(s) Oral daily  lactulose Syrup 30 Gram(s) Oral every 8 hours  losartan 100 milliGRAM(s) Oral daily  metoprolol tartrate 100 milliGRAM(s) Oral two times a day  metroNIDAZOLE  IVPB 500 milliGRAM(s) IV Intermittent every 12 hours  pantoprazole    Tablet 40 milliGRAM(s) Oral before breakfast  polyethylene glycol 3350 17 Gram(s) Oral two times a day  QUEtiapine Oral Tab/Cap - Peds 100 milliGRAM(s) Oral two times a day  senna 2 Tablet(s) Oral at bedtime    PRN Meds:    HOME MEDICATIONS:  buprenorphine-naloxone 8 mg-2 mg sublingual tablet: 1 tab sublingual 2 times a day  gabapentin 100 mg oral capsule: 1 cap(s) orally 3 times a day  hydroCHLOROthiazide 12.5 mg oral capsule: 1 cap(s) orally once a day  losartan 100 mg oral tablet: 1 tab(s) orally once a day  Metoprolol Tartrate 100 mg oral tablet: 1 tab(s) orally 2 times a day  omeprazole 20 mg oral delayed release capsule: 1 cap(s) orally once a day  SEROquel 100 mg oral tablet: 1 tab(s) orally 2 times a day  simvastatin 40 mg oral tablet: 1 tab(s) orally once a day (at bedtime)      Vital Signs:   T(F): 97.6 (20 @ 05:28), Max: 97.9 (20 @ 14:05)  HR: 53 (20 @ 05:40) (53 - 64)  BP: 133/65 (20 @ 05:28) (121/56 - 134/65)  RR: 18 (20 @ 05:40) (18 - 18)  SpO2: 98% (20 @ 20:20) (98% - 98%)      20 @ 07:01  -  20 @ 07:00  --------------------------------------------------------  IN: 150 mL / OUT: 0 mL / NET: 150 mL    20 @ 07:01  -  20 @ 06:16  --------------------------------------------------------  IN: 500 mL / OUT: 300 mL / NET: 200 mL    Physical Exam:   GENERAL: well nourished well developed female in NAD  HEENT: NCAT  CHEST/LUNG: audible breath sounds b/l  HEART: Regular rate and rhythm; s1 s2 appreciated  ABDOMEN: Obese, distended, soft, mildly tender abdomen; less pressure than before.   EXTREMITIES: No LE edema b/l  NERVOUS SYSTEM:  Alert & Oriented X3    Labs:                        11.5   5.24  )-----------( 63       ( 25 Aug 2020 05:48 )             35.1         139  |  103  |  8<L>  ----------------------------<  84  4.1   |  28  |  0.6<L>    Ca    8.2<L>      25 Aug 2020 05:48  Mg     1.6         TPro  7.3  /  Alb  3.1<L>  /  TBili  0.5  /  DBili  x   /  AST  80<H>  /  ALT  69<H>  /  AlkPhos  66      PT/INR - ( 24 Aug 2020 06:05 )   PT: 15.40 sec;   INR: 1.34 ratio         PTT - ( 24 Aug 2020 06:05 )  PTT:46.7 sec                     12.8   6.00  )-----------( 69       ( 24 Aug 2020 06:05 )             38.7       24 Aug 2020 06:05    140    |  103    |  8      ----------------------------<  80     4.2     |  29     |  0.6      Ca    8.6        24 Aug 2020 06:05  Mg     1.8       24 Aug 2020 06:05    TPro  8.2    /  Alb  3.4    /  TBili  0.9    /  DBili  x      /  AST  83     /  ALT  76     /  AlkPhos  70     24 Aug 2020 06:05    Amylase --, Lipase 56, 20 @ 21:35  Amylase --, Lipase 61, 20 @ 20:11    Troponin <0.01, CKMB --, CK -- 20 @ 05:51  Troponin <0.01, CKMB --, CK -- 20 @ 20:11    Iron 81, TIBC 286, %Sat 28 Ferritin 193 20 @ 11:34    Urinalysis Basic - ( 22 Aug 2020 20:11 )    Color: Yellow / Appearance: Turbid / S.019 / pH: x  Gluc: x / Ketone: Negative  / Bili: Negative / Urobili: 3 mg/dL   Blood: x / Protein: 30 mg/dL / Nitrite: Negative   Leuk Esterase: Large / RBC: 3 /HPF /  /HPF   Sq Epi: x / Non Sq Epi: 1 /HPF / Bacteria: Many    Culture - Blood (collected 20 @ 05:51)  Source: .Blood None  Preliminary Report (20 @ 12:02):    No growth to date.    Radiology:    Assessment and Plan:   53 yr F with HTN, anxiety and uterine fibroids, Hx of drugs abuse, depression and frequents [facility] admissions was bought in for evaluation of syncope and abd pain.    # Abd pain for 3 months with vaginal foul smelling and weight gain:  # Severe constipation - improved, patient had large volume BM   - Ddx for abdominal pain: severe chronic constipation, fibroid mass, ovarian mass - significant stool burden on CT   - continue with miralax, lactulose, and senna   - tap water enemas q4h   - hard large volume BM yesterday   - although no evidence of sepsis, +ve UA and fever at home, continue with rocephin  - UCx shows >100,000 CFU GNR  - CEA mildly elevated at 4.2. CA-125 11.  - patient recommended endometrial sampling by GYN, patient refused initially. However is agreeable this morning. OB/GYN will proceed with the procedure.     #Syncope:  - CTH negative, normal EKG, could be from UTI or severe pain   - troponin negative  - orthostatics negative  - TSH wnl     # Liver cirrhosis with portal HTN on CT due to previous Hep C?? VS MAI  - no biochemical evidence of cirrhosis except thrombocytopenia   - transaminitis 102/88: noted from previous labs numbers now better  - alpha 1 antitrypsin and ceruloplasmin wnl  - Hepatitis C positive  - Follow up HBV PCR , HCV PCR , BRIAN , ASMA , AMA , ANTI LKM, IGG levels  - Hold Lipitor  - Follow up GI recs    #HTN:  - losartan, HTZC and metoprolol     # depression abd anxiety  - Seroquel and escitalopram     # Hx of drug abuse  - continue with Suboxone  - send Utox     # Hypomagnesemia   - repleted x2    #Diet: full diet  #DVT pro: Lovenox  #GI pro: PPI  #Dispo: From home.

## 2020-08-25 NOTE — DISCHARGE NOTE PROVIDER - CARE PROVIDER_API CALL
Kuldeep Guerrero)  Gastroenterology; Internal Medicine  [address] [place] [address]  Phone: [phone]  Fax: [phone]  Follow Up Time: 2 weeks

## 2020-08-25 NOTE — DISCHARGE NOTE PROVIDER - NSDCCAREPROVSEEN_GEN_ALL_CORE_FT
Dr. Brennan   [facility] OB/[facility]  [facility] Dr. Torre   [facility]  [facility]

## 2020-08-25 NOTE — DISCHARGE NOTE PROVIDER - CARE PROVIDERS DIRECT ADDRESSES
[email or web address]

## 2020-08-25 NOTE — PROGRESS NOTE ADULT - SUBJECTIVE AND OBJECTIVE BOX
GHASSANLANDY VILLAFANAID  55y Female    CHIEF COMPLAINT:    Patient is a 55y old  Female who presents with a chief complaint of syncope (25 Aug 2020 06:16)      INTERVAL HPI/OVERNIGHT EVENTS:    Patient seen and examined. Having bowel movements since yesterday after giving enemas and laxatives. Abdominal discomfort has resolved.   No N/V. No palpitations. Endometrial biopsy today.     ROS: All other systems are negative.    Vital Signs:    T(F): 97.6 (20 @ 05:28), Max: 97.9 (20 @ 14:05)  HR: 53 (20 @ 05:40) (53 - 64)  BP: 133/65 (20 @ 05:28) (121/56 - 134/65)  RR: 18 (20 @ 05:40) (18 - 18)  SpO2: 98% (20 @ 08:53) (98% - 98%)  I&O's Summary    24 Aug 2020 07:  -  25 Aug 2020 07:00  --------------------------------------------------------  IN: 840 mL / OUT: 300 mL / NET: 540 mL    25 Aug 2020 07:01  -  25 Aug 2020 11:50  --------------------------------------------------------  IN: 330 mL / OUT: 0 mL / NET: 330 mL      Daily     Daily Weight in k.1 (25 Aug 2020 05:28)  CAPILLARY BLOOD GLUCOSE          PHYSICAL EXAM:    GENERAL:  NAD  SKIN: No rashes or lesions  HENT: Atraumatic Normocephalic. PERRL. Moist membranes.  NECK: Supple, No JVD. No lymphadenopathy.  PULMONARY: CTA B/L. No wheezing. No rales  CVS: Normal S1, S2. Rate and Rhythm are regular. No murmurs.  ABDOMEN/GI: Soft, Nontender, Nondistended; BS present  EXTREMITIES: Peripheral pulses intact. No edema B/L LE.  NEUROLOGIC:  No motor or sensory deficit.  PSYCH: Alert & oriented x 3    Consultant(s) Notes Reviewed:  [x ] YES  [ ] NO  Care Discussed with Consultants/Other Providers [ x] YES  [ ] NO    EKG reviewed  Telemetry reviewed    LABS:                        11.5   5.24  )-----------( 63       ( 25 Aug 2020 05:48 )             35.1   Hemoglobin: 11.5 g/dL ( @ 05:48)  Hemoglobin: 12.8 g/dL ( @ 06:05)  Hemoglobin: 11.9 g/dL ( @ 05:51)  Hemoglobin: 12.1 g/dL ( @ 21:35)  Hemoglobin: 12.8 g/dL ( @ 20:11)        139  |  103  |  8<L>  ----------------------------<  84  4.1   |  28  |  0.6<L>    Ca    8.2<L>      25 Aug 2020 05:48  Mg     1.6         TPro  7.3  /  Alb  3.1<L>  /  TBili  0.5  /  DBili  x   /  AST  80<H>  /  ALT  69<H>  /  AlkPhos  66      PT/INR - ( 24 Aug 2020 06:05 )   PT: 15.40 sec;   INR: 1.34 ratio         PTT - ( 24 Aug 2020 06:05 )  PTT:46.7 sec    Trop <0.01, CKMB --, CK --, 20 @ 05:51  Trop <0.01, CKMB --, CK --, 20 @ 20:11      Culture - Blood (collected 23 Aug 2020 05:51)  Source: .Blood None  Preliminary Report (24 Aug 2020 12:02):    No growth to date.    Culture - Urine (collected 22 Aug 2020 20:11)  Source: .Urine Clean Catch (Midstream)  Preliminary Report (24 Aug 2020 14:32):    >100,000 CFU/ml Klebsiella variicola  Organism: Klebsiella variicola (25 Aug 2020 10:49)  Organism: Klebsiella variicola (25 Aug 2020 10:49)        RADIOLOGY & ADDITIONAL TESTS:      Imaging or report Personally Reviewed:  [ ] YES  [ ] NO    Medications:  Standing  bisacodyl 5 milliGRAM(s) Oral at bedtime  buprenorphine 8 mG/naloxone 2 mG SL  Tablet 1 Tablet(s) SubLingual daily  cefTRIAXone   IVPB 1000 milliGRAM(s) IV Intermittent every 24 hours  enoxaparin Injectable 40 milliGRAM(s) SubCutaneous at bedtime  escitalopram 10 milliGRAM(s) Oral daily  gabapentin 100 milliGRAM(s) Oral three times a day  hydrochlorothiazide 12.5 milliGRAM(s) Oral daily  lactulose Syrup 30 Gram(s) Oral every 8 hours  losartan 100 milliGRAM(s) Oral daily  metoprolol tartrate 100 milliGRAM(s) Oral two times a day  mineral oil enema 133 milliLiter(s) Rectal daily  pantoprazole    Tablet 40 milliGRAM(s) Oral before breakfast  polyethylene glycol 3350 17 Gram(s) Oral two times a day  QUEtiapine Oral Tab/Cap - Peds 100 milliGRAM(s) Oral two times a day  senna 2 Tablet(s) Oral at bedtime    PRN Meds      Case discussed with resident    Care discussed with pt/family

## 2020-08-25 NOTE — DISCHARGE NOTE PROVIDER - HOSPITAL COURSE
53 yr F with HTN, anxiety and uterine fibroids, Hx of drugs abuse, previous Hep C ? depression and frequents [facility] admissions was bought in for evaluation of syncope and abd pain. Patient reports having lower abd pain since 3 months, that has been worsening for the past 3 days, associated with pinky and foul smelling vaginal discharge. Patient had fever prior to admission, and had episode of passing out today at work while she was speaking to her coworker, she felt on the ground and had LOC for 1-2 min,     Patient also reported weight gain of 30 Ibs since few months and increase abd distension, reports having Hx of chronic constipation, 1 BM per week. Patient is not sexually active.         Patient was admitted to telemetry and worked up for syncope. Troponins were negative, CTH was negative, EKG was normal, and TSH was wnl. She states she passed out sitting at work and it could have been secondary to pain. Her constipation on CT abdomen was extensive and she was given laxatives and enemas. She had a large volume bowel mvement         53 yr F with HTN, anxiety and uterine fibroids, Hx of drugs abuse, depression and frequents [facility] was bought in for evaluation of syncope and abd pain.        # Abd pain for 3 months with vaginal foul smelling and weight gain:    # Severe constipation - improved, patient had large volume BM     - Ddx for abdominal pain: severe chronic constipation, fibroid mass, ovarian mass - significant stool burden on CT     - continue with miralax, lactulose, and senna     - tap water enemas q4h     - hard large volume BM yesterday     - although no evidence of sepsis, +ve UA and fever at home, continue with rocephin    - UCx shows >100,000 CFU GNR    - CEA mildly elevated at 4.2. CA-125 11.    - patient recommended endometrial sampling by GYN, patient refused initially. However is agreeable this morning. OB/GYN will proceed with the procedure.         #Syncope:    - CTH negative, normal EKG, could be from UTI or severe pain     - troponin negative    - orthostatics negative    - TSH wnl         # Liver cirrhosis with portal HTN on CT due to previous Hep C?? VS MAI    - no biochemical evidence of cirrhosis except thrombocytopenia     - transaminitis 102/88: noted from previous labs numbers now better    - alpha 1 antitrypsin and ceruloplasmin wnl    - Hepatitis C positive    - Follow up HBV PCR , HCV PCR , BRIAN , ASMA , AMA , ANTI LKM, IGG levels    - Hold Lipitor    - Follow up GI recs        #HTN:    - losartan, HTZC and metoprolol         # depression abd anxiety    - Seroquel and escitalopram         # Hx of drug abuse    - continue with Suboxone    - send Utox         # Hypomagnesemia     - repleted x2        #Diet: full diet    #DVT pro: Lovenox    #GI pro: PPI    #Dispo: From home. 53 yr F with HTN, anxiety and uterine fibroids, Hx of drugs abuse, previous Hep C ? depression and frequents [facility] was bought in for evaluation of syncope and abd pain. Patient reports having lower abd pain since 3 months, that has been worsening for the past 3 days, associated with pinky and foul smelling vaginal discharge. Patient had fever prior to admission, and had episode of passing out today at work while she was speaking to her coworker, she felt on the ground and had LOC for 1-2 min,     Patient also reported weight gain of 30 Ibs since few months and increase abd distension, reports having Hx of chronic constipation, 1 BM per week. Patient is not sexually active.         Patient was admitted to telemetry and worked up for syncope. Troponins were negative, CTH was negative, EKG was normal, and TSH was wnl. She states she passed out sitting at work and it could have been secondary to pain. Her constipation on CT abdomen was extensive and she was given laxatives and enemas. She had a large volume bowel movement and is continuing on bowel regiment. OB/GYN did not suspect bacterial vaginal infection and performed endometrial sampling. Cirrhosis was also discovered on CT abdomen. She is Hep C positive and should follow with GI OP. Currently she is stable and amenable to d/c home. 53 yr F with HTN, uterine fibroids, hepatitis C and Hx of drugs abuse, anxiety and depression was brought in for evaluation of syncope and worsening lower abdominal pain. Patient reports having lower abd pain for the past 3 months that has been worsening for the past 3 days, associated with pinky and foul smelling vaginal discharge. Patient had fever and dysuria prior to admission, and had episode of passing out on 8/21 at work while she was speaking to her coworker sitting down,  reports LOC for 1-2 min.     Patient also reported weight gain of 30 Ibs since few months and increase abd distension, reports having Hx of chronic constipation, 1 BM per week. Patient is not sexually active.         Patient was admitted to telemetry and worked up for syncope. Troponins were negative, CTH was negative, EKG was normal, echo was normal, and TSH was wnl. She states she passed out sitting at work and it could have been secondary to pain. Her UA was positive for elevated WBC + leuk esterase, urine cultures revealed pansensitive Klebsiella, she was treated with IV Ceftriaxone and will complete her course of antibiotics with PO Cefpodoxime as outpatient. Her constipation on CT abdomen was extensive and she was given laxatives and enemas. She had a large volume bowel movement 2 days ago, again this morning, and is continuing on current bowel regiment. OB/GYN did not suspect bacterial vaginal infection and performed endometrial sampling which showed no signs of malignancy. Cirrhosis was also discovered incidentally on CT abdomen. Pt was worked up for chronic liver disease - had no biochemical evidence of cirrhosis except thrombocytopenia (platelets stable at 74 10^3/uL) with mild transaminitis - improved since prior. She is Hep C positive and should follow with GI OP. Currently she is stable and amenable to d/c home. 53 yr F with HTN, uterine fibroids, hepatitis C and Hx of drugs abuse, anxiety and depression was brought in for evaluation of syncope and worsening lower abdominal pain. Patient reports having lower abd pain for the past 3 months that has been worsening for the past 3 days, associated with pinky and foul smelling vaginal discharge. Patient had fever and dysuria prior to admission, and had episode of passing out on 8/21 at work while she was speaking to her coworker sitting down,  reports LOC for 1-2 min.     Patient also reported weight gain of 30 Ibs since few months and increase abd distension, reports having Hx of chronic constipation, 1 BM per week. Patient is not sexually active.         Patient was admitted to telemetry and worked up for syncope. Troponins were negative, CTH was negative, EKG was normal, echo was normal, and TSH was wnl. She states she passed out sitting at work and it could have been secondary to pain. Her UA was positive for elevated WBC + leuk esterase, urine cultures revealed pansensitive Klebsiella, she was treated with IV Ceftriaxone and will complete her course of antibiotics with PO Cefpodoxime as outpatient. Her constipation on CT abdomen was extensive and she was given laxatives and enemas. She had a large volume bowel movement 2 days ago, again this morning, and is continuing on current bowel regiment. OB/GYN did not suspect bacterial vaginal infection and performed endometrial sampling which showed no signs of malignancy. Cirrhosis was also discovered incidentally on CT abdomen. Pt was worked up for chronic liver disease - had no biochemical evidence of cirrhosis except thrombocytopenia (platelets stable at 74 10^3/uL) with mild transaminitis - improved since prior. She is Hep C positive and should follow with GI OP. Currently she is stable and amenable to d/c home. .

## 2020-08-25 NOTE — CHART NOTE - NSCHARTNOTEFT_GEN_A_CORE
R/B/A of procedure obtained, all questions answered, consent obtained. Pt placed in lithotomy position, speculum placed, cervix visualized, betadine used to clean the cervix.  Anterior lip of cervix grasped with single-tooth tenaculum.  Endometrial biopsy pipelle easily placed through cervix, 3 passes made with the pipelle, pt tolerated the procedure well, no bleeding or pain post procedure. Specimen taken to pathology.    Dr. Reyes and Dr. Venegas to be made aware.

## 2020-08-25 NOTE — DISCHARGE NOTE PROVIDER - NSFOLLOWUPCLINICS_GEN_ALL_ED_FT
[facility]  OB/GYN  [address] [place] [address]  Phone: [phone]  Fax:   Follow Up Time: 2 weeks

## 2020-08-25 NOTE — DISCHARGE NOTE PROVIDER - NSDCCPCAREPLAN_GEN_ALL_CORE_FT
PRINCIPAL DISCHARGE DIAGNOSIS  Diagnosis: Syncope  Assessment and Plan of Treatment: You have been found to have syncope likely caused as a vasovagal response      SECONDARY DISCHARGE DIAGNOSES  Diagnosis: Abdominal pain  Assessment and Plan of Treatment:     Diagnosis: UTI (urinary tract infection)  Assessment and Plan of Treatment: PRINCIPAL DISCHARGE DIAGNOSIS  Diagnosis: Syncope  Assessment and Plan of Treatment: Syncope is when you temporarily lose consciousness, also called fainting or passing out. It is caused by a sudden decrease in blood flow to the brain. Even though most causes of syncope are not dangerous, syncope can possibly be a sign of a serious medical problem. Signs that you may be about to faint include feeling dizzy, lightheaded, nausea, visual changes, or cold/clammy skin. Do not drive, operate heavy machinery, or play sports until your health care provider says it is okay.  SEEK IMMEDIATE MEDICAL CARE IF YOU HAVE ANY OF THE FOLLOWING SYMPTOMS: severe headache, pain in your chest/abdomen/back, bleeding from your mouth or rectum, palpitations, shortness of breath, pain with breathing, seizure, confusion, or trouble walking.      SECONDARY DISCHARGE DIAGNOSES  Diagnosis: Cirrhosis  Assessment and Plan of Treatment: Cirrhosis is a late stage of scarring (fibrosis) of the liver caused by many forms of liver diseases and conditions, such as hepatitis and chronic alcoholism.  Each time your liver is injured — whether by disease, excessive alcohol consumption or another cause — it tries to repair itself. In the process, scar tissue forms. As cirrhosis progresses, more and more scar tissue forms, making it difficult for the liver to function (decompensated cirrhosis). Advanced cirrhosis is life-threatening.  The liver damage done by cirrhosis generally can't be undone. But if liver cirrhosis is diagnosed early and the cause is treated, further damage can be limited and, rarely, reversed.  Your doctor will work to treat any complications of cirrhosis, including:  Excess fluid in your body. A low-sodium diet and medication to prevent fluid buildup in the body may help control ascites and swelling. More-severe fluid buildup may require procedures to drain the fluid or surgery to relieve pressure.  Portal hypertension. Certain blood pressure medications may control increased pressure in the veins that supply the liver (portal hypertension) and prevent severe bleeding. Your doctor will perform an upper endoscopy at regular intervals to look for enlarged veins in the esophagus or stomach (varices) that may bleed.  If you develop varices, you likely will need medication to reduce the risk of bleeding. If you have signs that the varices are bleeding or are likely to bleed, you may need a procedure (band ligation) to stop the bleeding or reduce the risk of further bleeding. In severe cases, you may need a small tube — a transjugular intrahepatic portosystemic shunt — placed in your vein to reduce blood pressure in your liver.  You are also at increased risk for Infections and liver cancer. Please follow up with gastroenterologist for further care. Call [phone] and return to the emergency room if you have chest pain, difficulty breathing or high fevers.

## 2020-08-25 NOTE — DISCHARGE NOTE PROVIDER - NSDCMRMEDTOKEN_GEN_ALL_CORE_FT
buprenorphine-naloxone 8 mg-2 mg sublingual tablet: 1 tab sublingual 2 times a day  escitalopram 10 mg oral tablet: 1 tab(s) orally once a day x 14 days MDD:for depression  gabapentin 100 mg oral capsule: 1 cap(s) orally 3 times a day  hydroCHLOROthiazide 12.5 mg oral capsule: 1 cap(s) orally once a day  losartan 100 mg oral tablet: 1 tab(s) orally once a day  Metoprolol Tartrate 100 mg oral tablet: 1 tab(s) orally 2 times a day  omeprazole 20 mg oral delayed release capsule: 1 cap(s) orally once a day  SEROquel 100 mg oral tablet: 1 tab(s) orally 2 times a day  simvastatin 40 mg oral tablet: 1 tab(s) orally once a day (at bedtime) bisacodyl 5 mg oral delayed release tablet: 1 tab(s) orally once a day (at bedtime), As Needed -for constipation  prn for constipation   buprenorphine-naloxone 8 mg-2 mg sublingual tablet: 1 tab sublingual 2 times a day  cefpodoxime 200 mg oral tablet: 1 tab(s) orally every 12 hours  escitalopram 10 mg oral tablet: 1 tab(s) orally once a day x 14 days MDD:for depression  gabapentin 100 mg oral capsule: 1 cap(s) orally 3 times a day  hydroCHLOROthiazide 12.5 mg oral capsule: 1 cap(s) orally once a day  lactulose 10 g/15 mL oral syrup: 30 milliliter(s) orally every 12 hours, As Needed -for constipation   losartan 100 mg oral tablet: 1 tab(s) orally once a day  Metoprolol Tartrate 100 mg oral tablet: 1 tab(s) orally 2 times a day  omeprazole 20 mg oral delayed release capsule: 1 cap(s) orally once a day  senna oral tablet: 2 tab(s) orally once a day (at bedtime)  SEROquel 100 mg oral tablet: 1 tab(s) orally 2 times a day  simvastatin 40 mg oral tablet: 1 tab(s) orally once a day (at bedtime)

## 2020-08-25 NOTE — PROGRESS NOTE ADULT - ASSESSMENT
53 yr F with HTN, anxiety and uterine fibroids, Hx of drugs abuse, previous Hep C ? depression and frequents [facility] was bought in for evaluation of syncope and abd pain. Pt reports having lower abd pain since 3 months, that has been worsening for the past 3 days, associated with pinky and foul smelling vaginal discharge.      1.	Syncope likely vasovagal  2.	Constipation  3.	Lower abdominal pain likely due to #2  4.	Fibroid uterus  5.	H/O Drug abuse on Suboxone.   6.	Panic attacks  7.	UTI  8.	Liver cirrhosis due to Hep C.           PLAN:    ·	No events on tele for the last 48 hrs. Can D/C tele  ·	ECHO showed EF of 64%  ·	Informed GYN that pt is interested in uterine biopsy.   ·	Iron studies, Alpha-1-antitrypsin level and ceruloplasmin are normal  ·	Pt is positive for Hep C. She is advised to F/U with GI to get treatment as an out pt.   ·	Tap water enema four times a day as per GI  ·	Cont Senna and Lactulose.   ·	Urine cx grew Klebsiella. Sensitive to Rocephin. Cont Rocephin.  ·	Cont her other meds.   ·	H/H is stable.   ·	D/C planning.

## 2020-08-26 LAB
A1AT SERPL-MCNC: 128 MG/DL — SIGNIFICANT CHANGE UP (ref 90–200)
ANA PAT FLD IF-IMP: ABNORMAL
ANA TITR SER: ABNORMAL
ANION GAP SERPL CALC-SCNC: 8 MMOL/L — SIGNIFICANT CHANGE UP (ref 7–14)
BUN SERPL-MCNC: 7 MG/DL — LOW (ref 10–20)
CALCIUM SERPL-MCNC: 8.5 MG/DL — SIGNIFICANT CHANGE UP (ref 8.5–10.1)
CERULOPLASMIN SERPL-MCNC: 21 MG/DL — SIGNIFICANT CHANGE UP (ref 16–45)
CHLORIDE SERPL-SCNC: 102 MMOL/L — SIGNIFICANT CHANGE UP (ref 98–110)
CO2 SERPL-SCNC: 30 MMOL/L — SIGNIFICANT CHANGE UP (ref 17–32)
CREAT SERPL-MCNC: 0.6 MG/DL — LOW (ref 0.7–1.5)
GLUCOSE SERPL-MCNC: 87 MG/DL — SIGNIFICANT CHANGE UP (ref 70–99)
HCT VFR BLD CALC: 37.3 % — SIGNIFICANT CHANGE UP (ref 37–47)
HGB BLD-MCNC: 12.3 G/DL — SIGNIFICANT CHANGE UP (ref 12–16)
IGG SERPL-MCNC: 2732 MG/DL — HIGH (ref 586–1602)
IGG1 SER-MCNC: 1655 MG/DL — HIGH (ref 248–810)
IGG2 SER-MCNC: 687 MG/DL — HIGH (ref 130–555)
IGG3 SER-MCNC: 73 MG/DL — SIGNIFICANT CHANGE UP (ref 15–102)
IGG4 SER-MCNC: 56 MG/DL — SIGNIFICANT CHANGE UP (ref 2–96)
LKM AB SER-ACNC: <20.1 UNITS — SIGNIFICANT CHANGE UP (ref 0–20)
LKM AB SER-ACNC: <20.1 UNITS — SIGNIFICANT CHANGE UP (ref 0–20)
MAGNESIUM SERPL-MCNC: 2.1 MG/DL — SIGNIFICANT CHANGE UP (ref 1.8–2.4)
MCHC RBC-ENTMCNC: 31.5 PG — HIGH (ref 27–31)
MCHC RBC-ENTMCNC: 33 G/DL — SIGNIFICANT CHANGE UP (ref 32–37)
MCV RBC AUTO: 95.4 FL — SIGNIFICANT CHANGE UP (ref 81–99)
MITOCHONDRIA AB SER-ACNC: SIGNIFICANT CHANGE UP
NRBC # BLD: 0 /100 WBCS — SIGNIFICANT CHANGE UP (ref 0–0)
PLATELET # BLD AUTO: 73 K/UL — LOW (ref 130–400)
POTASSIUM SERPL-MCNC: 4 MMOL/L — SIGNIFICANT CHANGE UP (ref 3.5–5)
POTASSIUM SERPL-SCNC: 4 MMOL/L — SIGNIFICANT CHANGE UP (ref 3.5–5)
RBC # BLD: 3.91 M/UL — LOW (ref 4.2–5.4)
RBC # FLD: 14.3 % — SIGNIFICANT CHANGE UP (ref 11.5–14.5)
SMOOTH MUSCLE AB SER-ACNC: SIGNIFICANT CHANGE UP
SODIUM SERPL-SCNC: 140 MMOL/L — SIGNIFICANT CHANGE UP (ref 135–146)
WBC # BLD: 5.58 K/UL — SIGNIFICANT CHANGE UP (ref 4.8–10.8)
WBC # FLD AUTO: 5.58 K/UL — SIGNIFICANT CHANGE UP (ref 4.8–10.8)

## 2020-08-26 PROCEDURE — 99233 SBSQ HOSP IP/OBS HIGH 50: CPT

## 2020-08-26 PROCEDURE — 74018 RADEX ABDOMEN 1 VIEW: CPT | Mod: 26

## 2020-08-26 RX ADMIN — BUPRENORPHINE AND NALOXONE 1 TABLET(S): 2; .5 TABLET SUBLINGUAL at 13:06

## 2020-08-26 RX ADMIN — ENOXAPARIN SODIUM 40 MILLIGRAM(S): 100 INJECTION SUBCUTANEOUS at 21:52

## 2020-08-26 RX ADMIN — CEFTRIAXONE 100 MILLIGRAM(S): 500 INJECTION, POWDER, FOR SOLUTION INTRAMUSCULAR; INTRAVENOUS at 01:54

## 2020-08-26 RX ADMIN — SENNA PLUS 2 TABLET(S): 8.6 TABLET ORAL at 21:51

## 2020-08-26 RX ADMIN — ESCITALOPRAM OXALATE 10 MILLIGRAM(S): 10 TABLET, FILM COATED ORAL at 13:06

## 2020-08-26 RX ADMIN — Medication 100 MILLIGRAM(S): at 05:44

## 2020-08-26 RX ADMIN — Medication 5 MILLIGRAM(S): at 21:51

## 2020-08-26 RX ADMIN — QUETIAPINE FUMARATE 100 MILLIGRAM(S): 200 TABLET, FILM COATED ORAL at 17:40

## 2020-08-26 RX ADMIN — LACTULOSE 30 GRAM(S): 10 SOLUTION ORAL at 13:05

## 2020-08-26 RX ADMIN — QUETIAPINE FUMARATE 100 MILLIGRAM(S): 200 TABLET, FILM COATED ORAL at 05:44

## 2020-08-26 RX ADMIN — Medication 100 MILLIGRAM(S): at 17:40

## 2020-08-26 RX ADMIN — GABAPENTIN 100 MILLIGRAM(S): 400 CAPSULE ORAL at 05:44

## 2020-08-26 RX ADMIN — POLYETHYLENE GLYCOL 3350 17 GRAM(S): 17 POWDER, FOR SOLUTION ORAL at 17:39

## 2020-08-26 RX ADMIN — PANTOPRAZOLE SODIUM 40 MILLIGRAM(S): 20 TABLET, DELAYED RELEASE ORAL at 05:57

## 2020-08-26 RX ADMIN — GABAPENTIN 100 MILLIGRAM(S): 400 CAPSULE ORAL at 13:06

## 2020-08-26 RX ADMIN — LACTULOSE 30 GRAM(S): 10 SOLUTION ORAL at 21:51

## 2020-08-26 RX ADMIN — LOSARTAN POTASSIUM 100 MILLIGRAM(S): 100 TABLET, FILM COATED ORAL at 05:44

## 2020-08-26 RX ADMIN — Medication 12.5 MILLIGRAM(S): at 05:44

## 2020-08-26 RX ADMIN — GABAPENTIN 100 MILLIGRAM(S): 400 CAPSULE ORAL at 21:51

## 2020-08-26 NOTE — PROGRESS NOTE ADULT - SUBJECTIVE AND OBJECTIVE BOX
HPI  Patient is a 55y old Female who presents with a chief complaint of syncope (25 Aug 2020 14:17)    Currently admitted to medicine with the primary diagnosis of Syncope     Today is hospital day 3d.       PAST MEDICAL & SURGICAL HISTORY  Hypertension  Panic attacks  Depression  Anxiety  H/O:   History of cholecystectomy    ALLERGIES  Benadryl (Unknown)    MEDICATIONS  STANDING MEDICATIONS  bisacodyl 5 milliGRAM(s) Oral at bedtime  buprenorphine 8 mG/naloxone 2 mG SL  Tablet 1 Tablet(s) SubLingual daily  cefTRIAXone   IVPB 1000 milliGRAM(s) IV Intermittent every 24 hours  enoxaparin Injectable 40 milliGRAM(s) SubCutaneous at bedtime  escitalopram 10 milliGRAM(s) Oral daily  gabapentin 100 milliGRAM(s) Oral three times a day  hydrochlorothiazide 12.5 milliGRAM(s) Oral daily  lactulose Syrup 30 Gram(s) Oral every 8 hours  losartan 100 milliGRAM(s) Oral daily  metoprolol tartrate 100 milliGRAM(s) Oral two times a day  mineral oil enema 133 milliLiter(s) Rectal daily  pantoprazole    Tablet 40 milliGRAM(s) Oral before breakfast  polyethylene glycol 3350 17 Gram(s) Oral two times a day  QUEtiapine Oral Tab/Cap - Peds 100 milliGRAM(s) Oral two times a day  senna 2 Tablet(s) Oral at bedtime    PRN MEDICATIONS    VITALS:  T(F): 96.9  HR: 64  BP: 114/53  RR: 18  SpO2: --        LABS                        12.3   5.58  )-----------( 73       ( 26 Aug 2020 07:10 )             37.3         139  |  103  |  8<L>  ----------------------------<  84  4.1   |  28  |  0.6<L>    Ca    8.2<L>      25 Aug 2020 05:48  Mg     1.6         TPro  7.3  /  Alb  3.1<L>  /  TBili  0.5  /  DBili  x   /  AST  80<H>  /  ALT  69<H>  /  AlkPhos  66        =====================================    INTERVAL HPI / OVERNIGHT EVENTS:  Patient was examined and seen at bedside. This morning she is resting comfortably in bed and reports no new issues or overnight events.     ROS: Otherwise unremarkable     PHYSICAL EXAM  GEN: NAD, Resting comfortably in bed  PULM: Clear to auscultation bilaterally, No wheezes  CVS: Regular rate and rhythm, S1-S2, no murmurs  ABD: Soft, non-tender, non-distended, no guarding  EXT: No edema  NEURO: A&Ox3, no focal deficits    IV ACCESS: no edema, erythema, or tenderness to palpation at IV access    ASSESSMENT    53 yr F with HTN, anxiety and uterine fibroids, Hx of drugs abuse, depression and frequents [facility] admissions was bought in for evaluation of syncope and abd pain.    #abd pain for 3 months with vaginal foul smelling and weight gain:  - Dx severe chronic constipation, fibroid mass, ovarian mass (but no evidence in CT, send markers)  - start Miralax, lactulose and senna  - although no evidence of sepsis, +ve UA and fever at home, start Rocephin  - start miconazole for suspected vaginitis,  - check TSH, f/u Ucx , Chlamydia and gonorrhea     #Syncope: CTH negative, normal EKG, could be from UTI or severe pain   - check orthostatic, TSh and troponin     #liver cirrhosis with portal HTN on CT due to previous Hep C ?? VS MAI  - no biochemical evidence of cirrhosis except thrombocytopenia   - check COAG  - transaminitis 102/88: noted from previous labs numbers now better  - needs evaluation of chronic liver dz, Hx of drug abuse, from [facility]: Hep C qualitatives RNA +ve, will send PCR  - Hold Lipitor     #HTN:  - losartan, HTZC and metoprolol     #depression abd anxiety  - Seroquel and escitalopram       #Hx of drug abuse  - Suboxone  - send Utox     #Diet: full diet  #DVT pro: Lovenox  #GI pro: PPI  #Dispo: from home HPI  Patient is a 55y old Female who presents with a chief complaint of syncope (25 Aug 2020 14:17)    Currently admitted to medicine with the primary diagnosis of Syncope     Today is hospital day 3d.       PAST MEDICAL & SURGICAL HISTORY  Hypertension  Panic attacks  Depression  Anxiety  H/O:   History of cholecystectomy    ALLERGIES  Benadryl (Unknown)    MEDICATIONS  STANDING MEDICATIONS  bisacodyl 5 milliGRAM(s) Oral at bedtime  buprenorphine 8 mG/naloxone 2 mG SL  Tablet 1 Tablet(s) SubLingual daily  cefTRIAXone   IVPB 1000 milliGRAM(s) IV Intermittent every 24 hours  enoxaparin Injectable 40 milliGRAM(s) SubCutaneous at bedtime  escitalopram 10 milliGRAM(s) Oral daily  gabapentin 100 milliGRAM(s) Oral three times a day  hydrochlorothiazide 12.5 milliGRAM(s) Oral daily  lactulose Syrup 30 Gram(s) Oral every 8 hours  losartan 100 milliGRAM(s) Oral daily  metoprolol tartrate 100 milliGRAM(s) Oral two times a day  mineral oil enema 133 milliLiter(s) Rectal daily  pantoprazole    Tablet 40 milliGRAM(s) Oral before breakfast  polyethylene glycol 3350 17 Gram(s) Oral two times a day  QUEtiapine Oral Tab/Cap - Peds 100 milliGRAM(s) Oral two times a day  senna 2 Tablet(s) Oral at bedtime    PRN MEDICATIONS    VITALS:  T(F): 96.9  HR: 64  BP: 114/53  RR: 18  SpO2: --        LABS                        12.3   5.58  )-----------( 73       ( 26 Aug 2020 07:10 )             37.3         139  |  103  |  8<L>  ----------------------------<  84  4.1   |  28  |  0.6<L>    Ca    8.2<L>      25 Aug 2020 05:48  Mg     1.6         TPro  7.3  /  Alb  3.1<L>  /  TBili  0.5  /  DBili  x   /  AST  80<H>  /  ALT  69<H>  /  AlkPhos  66        =====================================    INTERVAL HPI / OVERNIGHT EVENTS:  Patient was examined and seen at bedside. This morning she is resting comfortably in bed and reports no new issues or overnight events.     ROS: Otherwise unremarkable     PHYSICAL EXAM  GEN: NAD, Resting comfortably in bed  PULM: Clear to auscultation bilaterally, No wheezes  CVS: Regular rate and rhythm, S1-S2, no murmurs  ABD: Mild abdominal tenderness diffusely to lower abdomen on palpation, worse on right, non-distended, no guarding  EXT: No edema  NEURO: A&Ox3, no focal deficits    IV ACCESS: no edema, erythema, or tenderness to palpation at IV access    ASSESSMENT    53 yr F with HTN, anxiety, uterine fibroids, hepatitis C and Hx of drugs abuse, was bought in for evaluation of syncope and abd pain.    #Abd pain for 3 months with pink vaginal foul smelling discharge and weight gain:    Dx severe chronic constipation, fibroid mass, ovarian mass (but no evidence in CT, send markers)  - CEA mildly elevated at 4.2, CA-125 11. OB/GYN saw pt and does not suspect bacterial vaginal infection, performed endometrial sampling - pending results. Will consult OB/GYN.   - Constipation improving - pt has been passing small stools. Continue enema PRN and Miraxlax BID  - Chlamydia and gonorrhea both negative  - UCx revealed >100,000 GFU + leuk esterase - will continue Rocephalin     #Syncope:   - CTH head negative, normal EKG, troponin negative, orthostatics negative  - TSH wnl  - Workup non-revealing, likely vasovagal    #Liver cirrhosis with portal HTN on CT due to previous Hep C ?? VS MAI  - No biochemical evidence of cirrhosis except thrombocytopenia   - Transaminitis 80/69, compared to prior - 102/88: noted from previous labs numbers now better  - Workup for chronic liver disease: + Hep C positive.   - Hold Lipitor     #HTN:  - losartan, HTZC and metoprolol     #depression abd anxiety  - Seroquel and escitalopram       #Hx of drug abuse  - Suboxone  - send Utox     #Diet: full diet  #DVT pro: Lovenox  #GI pro: PPI  #Dispo: from home HPI  Patient is a 55y old Female who presents with a chief complaint of syncope (25 Aug 2020 14:17)    Currently admitted to medicine with the primary diagnosis of Syncope     Today is hospital day 3d.       PAST MEDICAL & SURGICAL HISTORY  Hypertension  Panic attacks  Depression  Anxiety  H/O:   History of cholecystectomy    ALLERGIES  Benadryl (Unknown)    MEDICATIONS  STANDING MEDICATIONS  bisacodyl 5 milliGRAM(s) Oral at bedtime  buprenorphine 8 mG/naloxone 2 mG SL  Tablet 1 Tablet(s) SubLingual daily  cefTRIAXone   IVPB 1000 milliGRAM(s) IV Intermittent every 24 hours  enoxaparin Injectable 40 milliGRAM(s) SubCutaneous at bedtime  escitalopram 10 milliGRAM(s) Oral daily  gabapentin 100 milliGRAM(s) Oral three times a day  hydrochlorothiazide 12.5 milliGRAM(s) Oral daily  lactulose Syrup 30 Gram(s) Oral every 8 hours  losartan 100 milliGRAM(s) Oral daily  metoprolol tartrate 100 milliGRAM(s) Oral two times a day  mineral oil enema 133 milliLiter(s) Rectal daily  pantoprazole    Tablet 40 milliGRAM(s) Oral before breakfast  polyethylene glycol 3350 17 Gram(s) Oral two times a day  QUEtiapine Oral Tab/Cap - Peds 100 milliGRAM(s) Oral two times a day  senna 2 Tablet(s) Oral at bedtime    PRN MEDICATIONS    VITALS:  T(F): 96.9  HR: 64  BP: 114/53  RR: 18  SpO2: --        LABS                        12.3   5.58  )-----------( 73       ( 26 Aug 2020 07:10 )             37.3         139  |  103  |  8<L>  ----------------------------<  84  4.1   |  28  |  0.6<L>    Ca    8.2<L>      25 Aug 2020 05:48  Mg     1.6         TPro  7.3  /  Alb  3.1<L>  /  TBili  0.5  /  DBili  x   /  AST  80<H>  /  ALT  69<H>  /  AlkPhos  66        =====================================    INTERVAL HPI / OVERNIGHT EVENTS:  Patient was examined and seen at bedside. This morning she is resting comfortably in bed and reports no new issues or overnight events.     ROS: Otherwise unremarkable     PHYSICAL EXAM  GEN: NAD, Resting comfortably in bed  PULM: Clear to auscultation bilaterally, No wheezes  CVS: Regular rate and rhythm, S1-S2, no murmurs  ABD: Mild abdominal tenderness diffusely to lower abdomen on palpation, worse on right, non-distended, no guarding  EXT: No edema  NEURO: A&Ox3, no focal deficits    IV ACCESS: no edema, erythema, or tenderness to palpation at IV access    ASSESSMENT    53 yr F with HTN, anxiety, uterine fibroids, hepatitis C and Hx of drugs abuse, was bought in for evaluation of syncope and abd pain.    #Abd pain for 3 months with pink vaginal foul smelling discharge and weight gain:    Dx severe chronic constipation, fibroid mass, ovarian mass (but no evidence in CT, send markers)  Fibroid Uterus:  - CEA mildly elevated at 4.2, CA-125 11.  - OB/GYN saw pt and does not suspect bacterial vaginal infection, performed endometrial sampling - pending results.   - Will consult OB/GYN.   Abdominal pain and constipation:  - Constipation improving - pt has been passing small stools.   - Continue enema PRN and Miraxlax BID  UTI:  - Chlamydia and gonorrhea both negative  - UCx revealed >100,000 GFU + leuk esterase - urine culture cx grew Klebsiella, sensitive to Rocephin. Will continue Rocephalin     #Syncope:   - CT head negative, normal EKG, troponin negative, orthostatics negative  - TSH wnl  - Workup non-revealing, likely vasovagal    #Liver cirrhosis with portal HTN on CT due to previous Hep C ?? VS MAI  - No biochemical evidence of cirrhosis except thrombocytopenia   - Transaminitis 80/69, compared to prior - 102/88: noted from previous labs numbers now better  - Workup for chronic liver disease: Iron studies, Alpha-1-antitrypsin level and ceruloplasmin are normal. Pt is positive for Hep C.   - She is advised to F/U with GI to get treatment as an out pt.  - Hold Lipitor     #HTN:  - losartan, HTZC and metoprolol     #Depression and anxiety  - Seroquel and escitalopram     #Hx of drug abuse  - Suboxone  - Send Utox     #Diet: full diet  #DVT pro: Lovenox  #GI pro: PPI  #Dispo: from home HPI  Patient is a 55y old Female who presents with a chief complaint of syncope (25 Aug 2020 14:17)    Currently admitted to medicine with the primary diagnosis of Syncope     Today is hospital day 3d.       PAST MEDICAL & SURGICAL HISTORY  Hypertension  Panic attacks  Depression  Anxiety  H/O:   History of cholecystectomy    ALLERGIES  Benadryl (Unknown)    MEDICATIONS  STANDING MEDICATIONS  bisacodyl 5 milliGRAM(s) Oral at bedtime  buprenorphine 8 mG/naloxone 2 mG SL  Tablet 1 Tablet(s) SubLingual daily  cefTRIAXone   IVPB 1000 milliGRAM(s) IV Intermittent every 24 hours  enoxaparin Injectable 40 milliGRAM(s) SubCutaneous at bedtime  escitalopram 10 milliGRAM(s) Oral daily  gabapentin 100 milliGRAM(s) Oral three times a day  hydrochlorothiazide 12.5 milliGRAM(s) Oral daily  lactulose Syrup 30 Gram(s) Oral every 8 hours  losartan 100 milliGRAM(s) Oral daily  metoprolol tartrate 100 milliGRAM(s) Oral two times a day  mineral oil enema 133 milliLiter(s) Rectal daily  pantoprazole    Tablet 40 milliGRAM(s) Oral before breakfast  polyethylene glycol 3350 17 Gram(s) Oral two times a day  QUEtiapine Oral Tab/Cap - Peds 100 milliGRAM(s) Oral two times a day  senna 2 Tablet(s) Oral at bedtime    PRN MEDICATIONS    VITALS:  T(F): 96.9  HR: 64  BP: 114/53  RR: 18  SpO2: --        LABS                        12.3   5.58  )-----------( 73       ( 26 Aug 2020 07:10 )             37.3         139  |  103  |  8<L>  ----------------------------<  84  4.1   |  28  |  0.6<L>    Ca    8.2<L>      25 Aug 2020 05:48  Mg     1.6         TPro  7.3  /  Alb  3.1<L>  /  TBili  0.5  /  DBili  x   /  AST  80<H>  /  ALT  69<H>  /  AlkPhos  66        =====================================    INTERVAL HPI / OVERNIGHT EVENTS:  Patient was examined and seen at bedside. This morning she is resting comfortably in bed and reports no new issues or overnight events.     ROS: Otherwise unremarkable     PHYSICAL EXAM  GEN: NAD, Resting comfortably in bed  PULM: Clear to auscultation bilaterally, No wheezes  CVS: Regular rate and rhythm, S1-S2, no murmurs  ABD: Mild abdominal tenderness diffusely to lower abdomen on palpation, worse on right, non-distended, no guarding  EXT: No edema  NEURO: A&Ox3, no focal deficits    IV ACCESS: no edema, erythema, or tenderness to palpation at IV access    ASSESSMENT    53 yr F with HTN, anxiety, uterine fibroids, hepatitis C and Hx of drugs abuse, was bought in for evaluation of syncope and abd pain.    #Abd pain for 3 months with pink vaginal foul smelling discharge and weight gain:    Dx severe chronic constipation, fibroid mass, ovarian mass (but no evidence in CT, send markers)  Fibroid Uterus:  - CEA mildly elevated at 4.2, CA-125 11.  - OB/GYN saw pt and does not suspect bacterial vaginal infection, performed endometrial sampling - pending results.   - Will consult OB/GYN.   Abdominal pain and constipation:  - Constipation improving - pt has been passing small stools.   - Continue enema PRN and Miraxlax BID  UTI:  - Chlamydia and gonorrhea both negative  - UCx revealed >100,000 GFU + leuk esterase - urine culture cx grew Klebsiella, sensitive to Rocephin. Will continue Rocephalin     #Syncope:   - CT head negative, normal EKG, troponin negative, orthostatics negative  - TSH wnl  - Workup non-revealing, likely vasovagal    #Liver cirrhosis with portal HTN on CT due to previous Hep C ?? VS MAI  - No biochemical evidence of cirrhosis except thrombocytopenia   - Transaminitis 80/69, compared to prior - 102/88: noted from previous labs numbers now better  - Workup for chronic liver disease: Iron studies, Alpha-1-antitrypsin level and ceruloplasmin are normal. Pt is positive for Hep C.   - She is advised to F/U with GI to get treatment as an out pt.  - Hold Lipitor     #HTN:  - losartan, HTZC and metoprolol   - BP has been wnl     #Depression and anxiety  - Seroquel and escitalopram   - no acute complaints related to mood disorders    #Hx of drug abuse  - Suboxone  - Send Utox     #Diet: full diet  #DVT pro: Lovenox  #GI pro: PPI  #Dispo: from home - can d/c with abx course if symptoms resolve, o/w fu endometrial sampling results, urine cx sensitivities inpatient

## 2020-08-27 ENCOUNTER — TRANSCRIPTION ENCOUNTER (OUTPATIENT)
Age: 55
End: 2020-08-27

## 2020-08-27 VITALS
TEMPERATURE: 97 F | SYSTOLIC BLOOD PRESSURE: 179 MMHG | DIASTOLIC BLOOD PRESSURE: 83 MMHG | HEART RATE: 52 BPM | RESPIRATION RATE: 18 BRPM

## 2020-08-27 LAB
-  AMIKACIN: SIGNIFICANT CHANGE UP
-  AMOXICILLIN/CLAVULANIC ACID: SIGNIFICANT CHANGE UP
-  AMPICILLIN/SULBACTAM: SIGNIFICANT CHANGE UP
-  AMPICILLIN: SIGNIFICANT CHANGE UP
-  AZTREONAM: SIGNIFICANT CHANGE UP
-  CEFAZOLIN: SIGNIFICANT CHANGE UP
-  CEFEPIME: SIGNIFICANT CHANGE UP
-  CEFOXITIN: SIGNIFICANT CHANGE UP
-  CEFTRIAXONE: SIGNIFICANT CHANGE UP
-  CIPROFLOXACIN: SIGNIFICANT CHANGE UP
-  ERTAPENEM: SIGNIFICANT CHANGE UP
-  GENTAMICIN: SIGNIFICANT CHANGE UP
-  IMIPENEM: SIGNIFICANT CHANGE UP
-  LEVOFLOXACIN: SIGNIFICANT CHANGE UP
-  MEROPENEM: SIGNIFICANT CHANGE UP
-  NITROFURANTOIN: SIGNIFICANT CHANGE UP
-  PIPERACILLIN/TAZOBACTAM: SIGNIFICANT CHANGE UP
-  TIGECYCLINE: SIGNIFICANT CHANGE UP
-  TOBRAMYCIN: SIGNIFICANT CHANGE UP
-  TRIMETHOPRIM/SULFAMETHOXAZOLE: SIGNIFICANT CHANGE UP
A VAGINAE DNA VAG QL NAA+PROBE: SIGNIFICANT CHANGE UP
BASOPHILS # BLD AUTO: 0.03 K/UL — SIGNIFICANT CHANGE UP (ref 0–0.2)
BASOPHILS NFR BLD AUTO: 0.6 % — SIGNIFICANT CHANGE UP (ref 0–1)
BVAB2 DNA VAG QL NAA+PROBE: SIGNIFICANT CHANGE UP
C ALBICANS DNA VAG QL NAA+PROBE: NEGATIVE — SIGNIFICANT CHANGE UP
C GLABRATA DNA VAG QL NAA+PROBE: NEGATIVE — SIGNIFICANT CHANGE UP
C KRUSEI DNA VAG QL NAA+PROBE: NEGATIVE — SIGNIFICANT CHANGE UP
C LUSITANIAE DNA VAG QL NAA+PROBE: NEGATIVE — SIGNIFICANT CHANGE UP
C TRACH RRNA SPEC QL NAA+PROBE: NEGATIVE — SIGNIFICANT CHANGE UP
CULTURE RESULTS: SIGNIFICANT CHANGE UP
EOSINOPHIL # BLD AUTO: 0.12 K/UL — SIGNIFICANT CHANGE UP (ref 0–0.7)
EOSINOPHIL NFR BLD AUTO: 2.2 % — SIGNIFICANT CHANGE UP (ref 0–8)
HCT VFR BLD CALC: 35 % — LOW (ref 37–47)
HCV RNA FLD QL NAA+PROBE: SIGNIFICANT CHANGE UP
HGB BLD-MCNC: 11.5 G/DL — LOW (ref 12–16)
IMM GRANULOCYTES NFR BLD AUTO: 0.2 % — SIGNIFICANT CHANGE UP (ref 0.1–0.3)
LYMPHOCYTES # BLD AUTO: 3.42 K/UL — HIGH (ref 1.2–3.4)
LYMPHOCYTES # BLD AUTO: 63.3 % — HIGH (ref 20.5–51.1)
MCHC RBC-ENTMCNC: 31 PG — SIGNIFICANT CHANGE UP (ref 27–31)
MCHC RBC-ENTMCNC: 32.9 G/DL — SIGNIFICANT CHANGE UP (ref 32–37)
MCV RBC AUTO: 94.3 FL — SIGNIFICANT CHANGE UP (ref 81–99)
MEGA1 DNA VAG QL NAA+PROBE: SIGNIFICANT CHANGE UP
METHOD TYPE: SIGNIFICANT CHANGE UP
MONOCYTES # BLD AUTO: 0.38 K/UL — SIGNIFICANT CHANGE UP (ref 0.1–0.6)
MONOCYTES NFR BLD AUTO: 7 % — SIGNIFICANT CHANGE UP (ref 1.7–9.3)
N GONORRHOEA RRNA SPEC QL NAA+PROBE: NEGATIVE — SIGNIFICANT CHANGE UP
NEUTROPHILS # BLD AUTO: 1.44 K/UL — SIGNIFICANT CHANGE UP (ref 1.4–6.5)
NEUTROPHILS NFR BLD AUTO: 26.7 % — LOW (ref 42.2–75.2)
NRBC # BLD: 0 /100 WBCS — SIGNIFICANT CHANGE UP (ref 0–0)
ORGANISM # SPEC MICROSCOPIC CNT: SIGNIFICANT CHANGE UP
PLATELET # BLD AUTO: 74 K/UL — LOW (ref 130–400)
RBC # BLD: 3.71 M/UL — LOW (ref 4.2–5.4)
RBC # FLD: 14.5 % — SIGNIFICANT CHANGE UP (ref 11.5–14.5)
SPECIMEN SOURCE: SIGNIFICANT CHANGE UP
SURGICAL PATHOLOGY STUDY: SIGNIFICANT CHANGE UP
T VAGINALIS RRNA SPEC QL NAA+PROBE: NEGATIVE — SIGNIFICANT CHANGE UP
WBC # BLD: 5.4 K/UL — SIGNIFICANT CHANGE UP (ref 4.8–10.8)
WBC # FLD AUTO: 5.4 K/UL — SIGNIFICANT CHANGE UP (ref 4.8–10.8)

## 2020-08-27 PROCEDURE — 99239 HOSP IP/OBS DSCHRG MGMT >30: CPT

## 2020-08-27 RX ORDER — LACTULOSE 10 G/15ML
30 SOLUTION ORAL EVERY 12 HOURS
Refills: 0 | Status: DISCONTINUED | OUTPATIENT
Start: 2020-08-27 | End: 2020-08-27

## 2020-08-27 RX ORDER — CEFPODOXIME PROXETIL 100 MG
200 TABLET ORAL EVERY 12 HOURS
Refills: 0 | Status: DISCONTINUED | OUTPATIENT
Start: 2020-08-27 | End: 2020-08-27

## 2020-08-27 RX ORDER — CEFPODOXIME PROXETIL 100 MG
1 TABLET ORAL
Qty: 4 | Refills: 0
Start: 2020-08-27 | End: 2020-08-28

## 2020-08-27 RX ORDER — LACTULOSE 10 G/15ML
30 SOLUTION ORAL
Qty: 1800 | Refills: 0
Start: 2020-08-27 | End: 2020-09-25

## 2020-08-27 RX ORDER — SENNA PLUS 8.6 MG/1
2 TABLET ORAL
Qty: 60 | Refills: 0
Start: 2020-08-27 | End: 2020-09-25

## 2020-08-27 RX ORDER — ESCITALOPRAM OXALATE 10 MG/1
1 TABLET, FILM COATED ORAL
Qty: 14 | Refills: 0
Start: 2020-08-27 | End: 2020-09-09

## 2020-08-27 RX ADMIN — ESCITALOPRAM OXALATE 10 MILLIGRAM(S): 10 TABLET, FILM COATED ORAL at 13:07

## 2020-08-27 RX ADMIN — GABAPENTIN 100 MILLIGRAM(S): 400 CAPSULE ORAL at 13:07

## 2020-08-27 RX ADMIN — Medication 12.5 MILLIGRAM(S): at 05:47

## 2020-08-27 RX ADMIN — QUETIAPINE FUMARATE 100 MILLIGRAM(S): 200 TABLET, FILM COATED ORAL at 05:47

## 2020-08-27 RX ADMIN — QUETIAPINE FUMARATE 100 MILLIGRAM(S): 200 TABLET, FILM COATED ORAL at 17:17

## 2020-08-27 RX ADMIN — Medication 100 MILLIGRAM(S): at 17:17

## 2020-08-27 RX ADMIN — LOSARTAN POTASSIUM 100 MILLIGRAM(S): 100 TABLET, FILM COATED ORAL at 05:47

## 2020-08-27 RX ADMIN — Medication 200 MILLIGRAM(S): at 17:16

## 2020-08-27 RX ADMIN — PANTOPRAZOLE SODIUM 40 MILLIGRAM(S): 20 TABLET, DELAYED RELEASE ORAL at 05:48

## 2020-08-27 RX ADMIN — GABAPENTIN 100 MILLIGRAM(S): 400 CAPSULE ORAL at 05:47

## 2020-08-27 RX ADMIN — CEFTRIAXONE 100 MILLIGRAM(S): 500 INJECTION, POWDER, FOR SOLUTION INTRAMUSCULAR; INTRAVENOUS at 00:33

## 2020-08-27 RX ADMIN — POLYETHYLENE GLYCOL 3350 17 GRAM(S): 17 POWDER, FOR SOLUTION ORAL at 05:48

## 2020-08-27 RX ADMIN — Medication 100 MILLIGRAM(S): at 05:47

## 2020-08-27 RX ADMIN — LACTULOSE 30 GRAM(S): 10 SOLUTION ORAL at 05:47

## 2020-08-27 RX ADMIN — BUPRENORPHINE AND NALOXONE 1 TABLET(S): 2; .5 TABLET SUBLINGUAL at 13:06

## 2020-08-27 RX ADMIN — LACTULOSE 30 GRAM(S): 10 SOLUTION ORAL at 17:17

## 2020-08-27 NOTE — PROGRESS NOTE ADULT - ATTENDING COMMENTS
I saw and examined the patient independently. I agree with above history, physical exam and plan of care which I have reviewed and edited where appropriate with following additions.     syncope likely vasovagal:   gaspar completed and neg.    abdominal pain multifactorial with chr. constipation due to narcotics / klebsiella UTI/ uterine fibroids:   had a BM today morning.   c/w lactulose q12 hr prn/ senna/ dulcolax PRN on discharge.   KUB xray noted: nonobstructive alicia pattern.   maalox prn for dyspepsia.  sp endometrial biopsy by GYN/  fu GYn as OP for biopsy results.   complete ABX with PO vantin for 2 more days to complete total of 7 days ABX.    liver cirrhosis possibly due to Hep C infection/ thrombocytopenia:   Hep C virus AB reactive.   US abd noted: no ascites/ cirrhotic liver.   fu GI as OP for HEp C treatment.   platelet count stable/ no active bleeding reported.     ambulating independently without difficulty. stable for DC home / Med rec reviewed with intern.    dvt  ppx     Progress note Handoff:   Pending: none / home today  Discussion: plan of care with  patient /  satisfied- all questions answered.  Disposition: home .
patient with incidentally found Cirrhosis. To be worked up as outpatient
I saw and examined the patient independently. I agree with above history, physical exam and plan of care which I have reviewed and edited where appropriate with following additions.     patient reports abdominal discomfort again with mostly pelvic and R sided abdominal tenderness on exam/ reports last Bm was 2 days ago. has yellow colored very foul smelling vaginal discharge/ not sexually active.     syncope likely vasovagal:   gaspar completed on telemonitored unit.     abdominal pain multifactorial with chr. constipation due to narcotics / klebsiella UTI/ uterine fibroids:   c/w miralax twice daily/ lactulose/ senna/ enema PRN.   [facility] xray noted: nonobstructive alicia pattern.   encourage activity as tolerated.   sp endometrial biopsy/ fU GYN for possible treatment of vaginal discharge/ otherwise fu as OP for biopsy results.   c/w rocephin for UTI/ UCX noted: pansensitive klebsiella/ can switch to oral ciprofloxacin to complete 7 days of ABX.     liver cirrhosis possibly due to Hep C infection/ thrombocytopenia:   Hep C virus AB reactive.   US abd noted: no ascites/ cirrhotic liver.   fu GI as OP for HEp C treatment.   platelet count stable/ no active bleeding reported.     activity as tolerated.    dvt  ppx     Progress note Handoff:   Pending: improvement of abdominal pain/ gyn fu  Discussion: plan of care with  patient /  satisfied- all questions answered.  Disposition: home

## 2020-08-27 NOTE — PROGRESS NOTE ADULT - ASSESSMENT
ASSESSMENT    54 y/o female with PMH of HTN, anxiety, uterine fibroids, hepatitis C and Hx of drugs abuse, was bought in for evaluation of syncope and abd pain.    #Abd pain for 3 months with acute worsening + dysuria and pink vaginal foul smelling discharge:    Dx severe chronic constipation vs. fibroid mass   Fibroid Uterus:  - CEA mildly elevated at 4.2, CA-125 11.  - OB/GYN saw pt and does not suspect bacterial vaginal infection, performed endometrial sampling - pending results.   - Pt can follow up with GYN as an oupatient for results of endometrial biopsy.   Abdominal pain and constipation:  - Constipation improving - pt has been passing small stools. Last BM was this morning following enema.   - Discontinue enema and Miraxlax, continue Lactulose 30 gram(s) PO q12  UTI:  - Chlamydia and gonorrhea both negative  - UCx revealed >100,000 GFU + leuk esterase - urine culture cx grew pansensitive Klebsiella. Discontinue IV Rocephalin and start pt on PO Vantin to complete 7 day course as outpatient.     #Hx of drug abuse  - Pt has not used in over 20 years  - Suboxone - explained to pt this could be related to her constipation. Pt no longer needs Suboxone and states she will stop taking it and see if constipation improves.     #Syncope:   - CT head negative, normal EKG, troponin negative, orthostatics negative  - TSH wnl  - Workup non-revealing, likely vasovagal    #Liver cirrhosis with portal HTN on CT due to previous Hep C ?? VS MAI  - No biochemical evidence of cirrhosis except thrombocytopenia   - Transaminitis 80/69, compared to prior - 102/88: noted from previous labs numbers now better  - Workup for chronic liver disease: Iron studies, Alpha-1-antitrypsin level and ceruloplasmin are normal. Pt is positive for Hep C.   - She is advised to F/U with GI to get treatment as an out pt.  - Hold Lipitor     #HTN:  - losartan, HTZC and metoprolol   - BP has been wnl     #Depression and anxiety  - Seroquel and escitalopram   - no acute complaints related to mood disorders    #Diet: full diet  #DVT pro: Lovenox  #GI pro: PPI  #Dispo: from home - can complete course of abx as outpatient, o/w fu endometrial sampling results, fu with GI for hepatitis C/cirrhosis workup

## 2020-08-27 NOTE — PROGRESS NOTE ADULT - SUBJECTIVE AND OBJECTIVE BOX
HPI  Patient is a 55y old Female who presents with a chief complaint of syncope and lower abdominal pain. Currently admitted to medicine with primary diagnosis of syncope. Today is hospital day 4.     PAST MEDICAL & SURGICAL HISTORY  Hypertension  Panic attacks  Depression  Anxiety  H/O:   History of cholecystectomy    ALLERGIES  Benadryl (Unknown)    MEDICATIONS  STANDING MEDICATIONS  bisacodyl 5 milliGRAM(s) Oral at bedtime  buprenorphine 8 mG/naloxone 2 mG SL  Tablet 1 Tablet(s) SubLingual daily  cefTRIAXone   IVPB 1000 milliGRAM(s) IV Intermittent every 24 hours  enoxaparin Injectable 40 milliGRAM(s) SubCutaneous at bedtime  escitalopram 10 milliGRAM(s) Oral daily  gabapentin 100 milliGRAM(s) Oral three times a day  hydrochlorothiazide 12.5 milliGRAM(s) Oral daily  lactulose Syrup 30 Gram(s) Oral every 8 hours  losartan 100 milliGRAM(s) Oral daily  metoprolol tartrate 100 milliGRAM(s) Oral two times a day  pantoprazole    Tablet 40 milliGRAM(s) Oral before breakfast  QUEtiapine Oral Tab/Cap - Peds 100 milliGRAM(s) Oral two times a day  senna 2 Tablet(s) Oral at bedtime    PRN MEDICATIONS    VITALS:  T(F): 96.9  HR: 64  BP: 114/53  RR: 18  SpO2: --        LABS                        12.3   5.58  )-----------( 73       ( 26 Aug 2020 07:10 )             37.3     -    139  |  103  |  8<L>  ----------------------------<  84  4.1   |  28  |  0.6<L>    Ca    8.2<L>      25 Aug 2020 05:48  Mg     1.6         TPro  7.3  /  Alb  3.1<L>  /  TBili  0.5  /  DBili  x   /  AST  80<H>  /  ALT  69<H>  /  AlkPhos  66        =====================================      INTERVAL HPI / OVERNIGHT EVENTS:  Patient was examined and seen at bedside. This morning she is resting comfortably in bed and reports no new issues or overnight events.     ROS: Otherwise unremarkable     PHYSICAL EXAM  GEN: NAD, Resting comfortably in bed  PULM: Clear to auscultation bilaterally, No wheezes  CVS: Regular rate and rhythm, S1-S2, no murmurs  ABD: Mild abdominal tenderness diffusely to lower abdomen on palpation, worse on right, non-distended, no guarding  EXT: No edema  NEURO: A&Ox3, no focal deficits    IV ACCESS: no edema, erythema, or tenderness to palpation at IV access HPI  Patient is a 55y old Female who presents with a chief complaint of syncope and lower abdominal pain. Currently admitted to medicine with primary diagnosis of syncope. Today is hospital day 4.     PAST MEDICAL & SURGICAL HISTORY  Hypertension  Panic attacks  Depression  Anxiety  H/O:   History of cholecystectomy    ALLERGIES  Benadryl (Unknown)    MEDICATIONS  STANDING MEDICATIONS  bisacodyl 5 milliGRAM(s) Oral at bedtime  buprenorphine 8 mG/naloxone 2 mG SL  Tablet 1 Tablet(s) SubLingual daily  cefpodoxime 200 milliGRAM(s) Oral every 12 hours  enoxaparin Injectable 40 milliGRAM(s) SubCutaneous at bedtime  escitalopram 10 milliGRAM(s) Oral daily  gabapentin 100 milliGRAM(s) Oral three times a day  hydrochlorothiazide 12.5 milliGRAM(s) Oral daily  lactulose Syrup 30 Gram(s) Oral every 8 hours  losartan 100 milliGRAM(s) Oral daily  metoprolol tartrate 100 milliGRAM(s) Oral two times a day  pantoprazole    Tablet 40 milliGRAM(s) Oral before breakfast  QUEtiapine Oral Tab/Cap - Peds 100 milliGRAM(s) Oral two times a day  senna 2 Tablet(s) Oral at bedtime    PRN MEDICATIONS    VITALS:  T(F): 96.9  HR: 64  BP: 114/53  RR: 18  SpO2: --        LABS                        12.3   5.58  )-----------( 73       ( 26 Aug 2020 07:10 )             37.3     -    139  |  103  |  8<L>  ----------------------------<  84  4.1   |  28  |  0.6<L>    Ca    8.2<L>      25 Aug 2020 05:48  Mg     1.6         TPro  7.3  /  Alb  3.1<L>  /  TBili  0.5  /  DBili  x   /  AST  80<H>  /  ALT  69<H>  /  AlkPhos  66        =====================================      INTERVAL HPI / OVERNIGHT EVENTS:  Patient was examined and seen at bedside. This morning she is resting comfortably in bed and reports no new issues or overnight events.     ROS: Otherwise unremarkable     PHYSICAL EXAM  GEN: NAD, Resting comfortably in bed  PULM: Clear to auscultation bilaterally, No wheezes  CVS: Regular rate and rhythm, S1-S2, no murmurs  ABD: Mild abdominal tenderness diffusely to lower abdomen on palpation, worse on right, non-distended, no guarding  EXT: No edema  NEURO: A&Ox3, no focal deficits    IV ACCESS: no edema, erythema, or tenderness to palpation at IV access

## 2020-08-27 NOTE — DISCHARGE NOTE NURSING/CASE MANAGEMENT/SOCIAL WORK - PATIENT PORTAL LINK FT
You can access the FollowMyHealth Patient Portal offered by [facility] by registering at the following website: [email or web address]. By joining [employer]’s FollowMyHealth portal, you will also be able to view your health information using other applications (apps) compatible with our system.

## 2020-08-28 LAB
CULTURE RESULTS: SIGNIFICANT CHANGE UP
SPECIMEN SOURCE: SIGNIFICANT CHANGE UP

## 2020-08-31 DIAGNOSIS — D69.6 THROMBOCYTOPENIA, UNSPECIFIED: ICD-10-CM

## 2020-08-31 DIAGNOSIS — N39.0 URINARY TRACT INFECTION, SITE NOT SPECIFIED: ICD-10-CM

## 2020-08-31 DIAGNOSIS — T50.7X5A ADVERSE EFFECT OF ANALEPTICS AND OPIOID RECEPTOR ANTAGONISTS, INITIAL ENCOUNTER: ICD-10-CM

## 2020-08-31 DIAGNOSIS — Z88.8 ALLERGY STATUS TO OTHER DRUGS, MEDICAMENTS AND BIOLOGICAL SUBSTANCES: ICD-10-CM

## 2020-08-31 DIAGNOSIS — R55 SYNCOPE AND COLLAPSE: ICD-10-CM

## 2020-08-31 DIAGNOSIS — Z79.891 LONG TERM (CURRENT) USE OF OPIATE ANALGESIC: ICD-10-CM

## 2020-08-31 DIAGNOSIS — B19.20 UNSPECIFIED VIRAL HEPATITIS C WITHOUT HEPATIC COMA: ICD-10-CM

## 2020-08-31 DIAGNOSIS — Z90.49 ACQUIRED ABSENCE OF OTHER SPECIFIED PARTS OF DIGESTIVE TRACT: ICD-10-CM

## 2020-08-31 DIAGNOSIS — F32.9 MAJOR DEPRESSIVE DISORDER, SINGLE EPISODE, UNSPECIFIED: ICD-10-CM

## 2020-08-31 DIAGNOSIS — F41.0 PANIC DISORDER [EPISODIC PAROXYSMAL ANXIETY]: ICD-10-CM

## 2020-08-31 DIAGNOSIS — D25.2 SUBSEROSAL LEIOMYOMA OF UTERUS: ICD-10-CM

## 2020-08-31 DIAGNOSIS — K76.6 PORTAL HYPERTENSION: ICD-10-CM

## 2020-08-31 DIAGNOSIS — E83.42 HYPOMAGNESEMIA: ICD-10-CM

## 2020-08-31 DIAGNOSIS — K74.69 OTHER CIRRHOSIS OF LIVER: ICD-10-CM

## 2020-08-31 DIAGNOSIS — F17.210 NICOTINE DEPENDENCE, CIGARETTES, UNCOMPLICATED: ICD-10-CM

## 2020-08-31 DIAGNOSIS — R16.1 SPLENOMEGALY, NOT ELSEWHERE CLASSIFIED: ICD-10-CM

## 2020-08-31 DIAGNOSIS — B96.1 KLEBSIELLA PNEUMONIAE [K. PNEUMONIAE] AS THE CAUSE OF DISEASES CLASSIFIED ELSEWHERE: ICD-10-CM

## 2020-08-31 DIAGNOSIS — K59.03 DRUG INDUCED CONSTIPATION: ICD-10-CM

## 2020-09-03 LAB
HCV RNA SERPL NAA DL=5-ACNC: SIGNIFICANT CHANGE UP IU/ML
HCV RNA SPEC NAA+PROBE-LOG IU: 5.26 LOG10IU/ML — SIGNIFICANT CHANGE UP

## 2020-10-11 ENCOUNTER — INPATIENT (INPATIENT)
Facility: HOSPITAL | Age: 55
LOS: 2 days | Discharge: HOME | End: 2020-10-14
Attending: HOSPITALIST | Admitting: HOSPITALIST
Payer: MEDICAID

## 2020-10-11 VITALS
SYSTOLIC BLOOD PRESSURE: 174 MMHG | HEIGHT: 64 IN | RESPIRATION RATE: 18 BRPM | HEART RATE: 82 BPM | OXYGEN SATURATION: 100 % | DIASTOLIC BLOOD PRESSURE: 100 MMHG | TEMPERATURE: 98 F

## 2020-10-11 DIAGNOSIS — Z98.891 HISTORY OF UTERINE SCAR FROM PREVIOUS SURGERY: Chronic | ICD-10-CM

## 2020-10-11 DIAGNOSIS — Z90.49 ACQUIRED ABSENCE OF OTHER SPECIFIED PARTS OF DIGESTIVE TRACT: Chronic | ICD-10-CM

## 2020-10-11 LAB
ALBUMIN SERPL ELPH-MCNC: 3.4 G/DL — LOW (ref 3.5–5.2)
ALP SERPL-CCNC: 83 U/L — SIGNIFICANT CHANGE UP (ref 30–115)
ALT FLD-CCNC: 83 U/L — HIGH (ref 0–41)
ANION GAP SERPL CALC-SCNC: 9 MMOL/L — SIGNIFICANT CHANGE UP (ref 7–14)
APAP SERPL-MCNC: <5 UG/ML — LOW (ref 10–30)
APPEARANCE UR: CLEAR — SIGNIFICANT CHANGE UP
APTT BLD: 38.9 SEC — SIGNIFICANT CHANGE UP (ref 27–39.2)
AST SERPL-CCNC: 106 U/L — HIGH (ref 0–41)
BACTERIA # UR AUTO: ABNORMAL
BASOPHILS # BLD AUTO: 0.04 K/UL — SIGNIFICANT CHANGE UP (ref 0–0.2)
BASOPHILS NFR BLD AUTO: 0.8 % — SIGNIFICANT CHANGE UP (ref 0–1)
BILIRUB SERPL-MCNC: 0.5 MG/DL — SIGNIFICANT CHANGE UP (ref 0.2–1.2)
BILIRUB UR-MCNC: NEGATIVE — SIGNIFICANT CHANGE UP
BUN SERPL-MCNC: 12 MG/DL — SIGNIFICANT CHANGE UP (ref 10–20)
CALCIUM SERPL-MCNC: 8.7 MG/DL — SIGNIFICANT CHANGE UP (ref 8.5–10.1)
CHLORIDE SERPL-SCNC: 103 MMOL/L — SIGNIFICANT CHANGE UP (ref 98–110)
CO2 SERPL-SCNC: 27 MMOL/L — SIGNIFICANT CHANGE UP (ref 17–32)
COLOR SPEC: YELLOW — SIGNIFICANT CHANGE UP
CREAT SERPL-MCNC: 0.6 MG/DL — LOW (ref 0.7–1.5)
DIFF PNL FLD: NEGATIVE — SIGNIFICANT CHANGE UP
EOSINOPHIL # BLD AUTO: 0.08 K/UL — SIGNIFICANT CHANGE UP (ref 0–0.7)
EOSINOPHIL NFR BLD AUTO: 1.5 % — SIGNIFICANT CHANGE UP (ref 0–8)
EPI CELLS # UR: 2 /HPF — SIGNIFICANT CHANGE UP (ref 0–5)
ETHANOL SERPL-MCNC: <10 MG/DL — SIGNIFICANT CHANGE UP
GLUCOSE SERPL-MCNC: 111 MG/DL — HIGH (ref 70–99)
GLUCOSE UR QL: NEGATIVE — SIGNIFICANT CHANGE UP
HCT VFR BLD CALC: 34.6 % — LOW (ref 37–47)
HGB BLD-MCNC: 11.2 G/DL — LOW (ref 12–16)
HYALINE CASTS # UR AUTO: 3 /LPF — SIGNIFICANT CHANGE UP (ref 0–7)
IMM GRANULOCYTES NFR BLD AUTO: 0.4 % — HIGH (ref 0.1–0.3)
INR BLD: 1.42 RATIO — HIGH (ref 0.65–1.3)
KETONES UR-MCNC: NEGATIVE — SIGNIFICANT CHANGE UP
LEUKOCYTE ESTERASE UR-ACNC: ABNORMAL
LYMPHOCYTES # BLD AUTO: 2.35 K/UL — SIGNIFICANT CHANGE UP (ref 1.2–3.4)
LYMPHOCYTES # BLD AUTO: 45.1 % — SIGNIFICANT CHANGE UP (ref 20.5–51.1)
MCHC RBC-ENTMCNC: 31 PG — SIGNIFICANT CHANGE UP (ref 27–31)
MCHC RBC-ENTMCNC: 32.4 G/DL — SIGNIFICANT CHANGE UP (ref 32–37)
MCV RBC AUTO: 95.8 FL — SIGNIFICANT CHANGE UP (ref 81–99)
MONOCYTES # BLD AUTO: 0.33 K/UL — SIGNIFICANT CHANGE UP (ref 0.1–0.6)
MONOCYTES NFR BLD AUTO: 6.3 % — SIGNIFICANT CHANGE UP (ref 1.7–9.3)
NEUTROPHILS # BLD AUTO: 2.39 K/UL — SIGNIFICANT CHANGE UP (ref 1.4–6.5)
NEUTROPHILS NFR BLD AUTO: 45.9 % — SIGNIFICANT CHANGE UP (ref 42.2–75.2)
NITRITE UR-MCNC: NEGATIVE — SIGNIFICANT CHANGE UP
NRBC # BLD: 0 /100 WBCS — SIGNIFICANT CHANGE UP (ref 0–0)
PH UR: 7 — SIGNIFICANT CHANGE UP (ref 5–8)
PLATELET # BLD AUTO: 91 K/UL — LOW (ref 130–400)
POTASSIUM SERPL-MCNC: 4.2 MMOL/L — SIGNIFICANT CHANGE UP (ref 3.5–5)
POTASSIUM SERPL-SCNC: 4.2 MMOL/L — SIGNIFICANT CHANGE UP (ref 3.5–5)
PROT SERPL-MCNC: 8.4 G/DL — HIGH (ref 6–8)
PROT UR-MCNC: SIGNIFICANT CHANGE UP
PROTHROM AB SERPL-ACNC: 16.3 SEC — HIGH (ref 9.95–12.87)
RBC # BLD: 3.61 M/UL — LOW (ref 4.2–5.4)
RBC # FLD: 15 % — HIGH (ref 11.5–14.5)
RBC CASTS # UR COMP ASSIST: 4 /HPF — SIGNIFICANT CHANGE UP (ref 0–4)
SALICYLATES SERPL-MCNC: <0.3 MG/DL — LOW (ref 4–30)
SODIUM SERPL-SCNC: 139 MMOL/L — SIGNIFICANT CHANGE UP (ref 135–146)
SP GR SPEC: 1.04 — HIGH (ref 1.01–1.03)
TROPONIN T SERPL-MCNC: <0.01 NG/ML — SIGNIFICANT CHANGE UP
UROBILINOGEN FLD QL: SIGNIFICANT CHANGE UP
WBC # BLD: 5.21 K/UL — SIGNIFICANT CHANGE UP (ref 4.8–10.8)
WBC # FLD AUTO: 5.21 K/UL — SIGNIFICANT CHANGE UP (ref 4.8–10.8)
WBC UR QL: 17 /HPF — HIGH (ref 0–5)

## 2020-10-11 PROCEDURE — 93010 ELECTROCARDIOGRAM REPORT: CPT

## 2020-10-11 PROCEDURE — 72125 CT NECK SPINE W/O DYE: CPT | Mod: 26

## 2020-10-11 PROCEDURE — 99223 1ST HOSP IP/OBS HIGH 75: CPT | Mod: AI

## 2020-10-11 PROCEDURE — 71045 X-RAY EXAM CHEST 1 VIEW: CPT | Mod: 26

## 2020-10-11 PROCEDURE — 74177 CT ABD & PELVIS W/CONTRAST: CPT | Mod: 26

## 2020-10-11 PROCEDURE — 71260 CT THORAX DX C+: CPT | Mod: 26

## 2020-10-11 PROCEDURE — 70450 CT HEAD/BRAIN W/O DYE: CPT | Mod: 26

## 2020-10-11 PROCEDURE — 72148 MRI LUMBAR SPINE W/O DYE: CPT | Mod: 26

## 2020-10-11 PROCEDURE — 73562 X-RAY EXAM OF KNEE 3: CPT | Mod: 26,RT

## 2020-10-11 PROCEDURE — 99285 EMERGENCY DEPT VISIT HI MDM: CPT

## 2020-10-11 PROCEDURE — 72170 X-RAY EXAM OF PELVIS: CPT | Mod: 26

## 2020-10-11 RX ORDER — HYDROCHLOROTHIAZIDE 25 MG
12.5 TABLET ORAL DAILY
Refills: 0 | Status: DISCONTINUED | OUTPATIENT
Start: 2020-10-11 | End: 2020-10-14

## 2020-10-11 RX ORDER — SENNA PLUS 8.6 MG/1
2 TABLET ORAL AT BEDTIME
Refills: 0 | Status: DISCONTINUED | OUTPATIENT
Start: 2020-10-11 | End: 2020-10-11

## 2020-10-11 RX ORDER — CEFTRIAXONE 500 MG/1
1000 INJECTION, POWDER, FOR SOLUTION INTRAMUSCULAR; INTRAVENOUS EVERY 24 HOURS
Refills: 0 | Status: DISCONTINUED | OUTPATIENT
Start: 2020-10-11 | End: 2020-10-14

## 2020-10-11 RX ORDER — HYDRALAZINE HCL 50 MG
10 TABLET ORAL ONCE
Refills: 0 | Status: COMPLETED | OUTPATIENT
Start: 2020-10-11 | End: 2020-10-11

## 2020-10-11 RX ORDER — INFLUENZA VIRUS VACCINE 15; 15; 15; 15 UG/.5ML; UG/.5ML; UG/.5ML; UG/.5ML
0.5 SUSPENSION INTRAMUSCULAR ONCE
Refills: 0 | Status: DISCONTINUED | OUTPATIENT
Start: 2020-10-11 | End: 2020-10-14

## 2020-10-11 RX ORDER — LIDOCAINE 4 G/100G
1 CREAM TOPICAL ONCE
Refills: 0 | Status: COMPLETED | OUTPATIENT
Start: 2020-10-11 | End: 2020-10-11

## 2020-10-11 RX ORDER — CHLORHEXIDINE GLUCONATE 213 G/1000ML
1 SOLUTION TOPICAL
Refills: 0 | Status: DISCONTINUED | OUTPATIENT
Start: 2020-10-11 | End: 2020-10-14

## 2020-10-11 RX ORDER — ENOXAPARIN SODIUM 100 MG/ML
40 INJECTION SUBCUTANEOUS AT BEDTIME
Refills: 0 | Status: DISCONTINUED | OUTPATIENT
Start: 2020-10-11 | End: 2020-10-14

## 2020-10-11 RX ORDER — SIMVASTATIN 20 MG/1
40 TABLET, FILM COATED ORAL AT BEDTIME
Refills: 0 | Status: DISCONTINUED | OUTPATIENT
Start: 2020-10-11 | End: 2020-10-14

## 2020-10-11 RX ORDER — SENNA PLUS 8.6 MG/1
2 TABLET ORAL AT BEDTIME
Refills: 0 | Status: DISCONTINUED | OUTPATIENT
Start: 2020-10-11 | End: 2020-10-14

## 2020-10-11 RX ORDER — LOSARTAN POTASSIUM 100 MG/1
100 TABLET, FILM COATED ORAL DAILY
Refills: 0 | Status: DISCONTINUED | OUTPATIENT
Start: 2020-10-11 | End: 2020-10-14

## 2020-10-11 RX ORDER — GABAPENTIN 400 MG/1
100 CAPSULE ORAL THREE TIMES A DAY
Refills: 0 | Status: DISCONTINUED | OUTPATIENT
Start: 2020-10-11 | End: 2020-10-12

## 2020-10-11 RX ORDER — ESCITALOPRAM OXALATE 10 MG/1
10 TABLET, FILM COATED ORAL DAILY
Refills: 0 | Status: DISCONTINUED | OUTPATIENT
Start: 2020-10-11 | End: 2020-10-14

## 2020-10-11 RX ORDER — MORPHINE SULFATE 50 MG/1
6 CAPSULE, EXTENDED RELEASE ORAL ONCE
Refills: 0 | Status: DISCONTINUED | OUTPATIENT
Start: 2020-10-11 | End: 2020-10-11

## 2020-10-11 RX ORDER — METOPROLOL TARTRATE 50 MG
100 TABLET ORAL
Refills: 0 | Status: DISCONTINUED | OUTPATIENT
Start: 2020-10-11 | End: 2020-10-14

## 2020-10-11 RX ORDER — PANTOPRAZOLE SODIUM 20 MG/1
40 TABLET, DELAYED RELEASE ORAL
Refills: 0 | Status: DISCONTINUED | OUTPATIENT
Start: 2020-10-11 | End: 2020-10-14

## 2020-10-11 RX ORDER — QUETIAPINE FUMARATE 200 MG/1
100 TABLET, FILM COATED ORAL
Refills: 0 | Status: DISCONTINUED | OUTPATIENT
Start: 2020-10-11 | End: 2020-10-14

## 2020-10-11 RX ORDER — OXYCODONE AND ACETAMINOPHEN 5; 325 MG/1; MG/1
2 TABLET ORAL EVERY 4 HOURS
Refills: 0 | Status: DISCONTINUED | OUTPATIENT
Start: 2020-10-11 | End: 2020-10-12

## 2020-10-11 RX ORDER — ACETAMINOPHEN 500 MG
650 TABLET ORAL EVERY 6 HOURS
Refills: 0 | Status: DISCONTINUED | OUTPATIENT
Start: 2020-10-11 | End: 2020-10-14

## 2020-10-11 RX ADMIN — LIDOCAINE 1 PATCH: 4 CREAM TOPICAL at 21:59

## 2020-10-11 RX ADMIN — CEFTRIAXONE 100 MILLIGRAM(S): 500 INJECTION, POWDER, FOR SOLUTION INTRAMUSCULAR; INTRAVENOUS at 22:00

## 2020-10-11 RX ADMIN — MORPHINE SULFATE 6 MILLIGRAM(S): 50 CAPSULE, EXTENDED RELEASE ORAL at 18:38

## 2020-10-11 RX ADMIN — Medication 650 MILLIGRAM(S): at 23:18

## 2020-10-11 RX ADMIN — SENNA PLUS 2 TABLET(S): 8.6 TABLET ORAL at 22:01

## 2020-10-11 RX ADMIN — Medication 10 MILLIGRAM(S): at 23:19

## 2020-10-11 RX ADMIN — Medication 650 MILLIGRAM(S): at 23:48

## 2020-10-11 RX ADMIN — MORPHINE SULFATE 6 MILLIGRAM(S): 50 CAPSULE, EXTENDED RELEASE ORAL at 18:23

## 2020-10-11 RX ADMIN — ENOXAPARIN SODIUM 40 MILLIGRAM(S): 100 INJECTION SUBCUTANEOUS at 21:59

## 2020-10-11 RX ADMIN — SIMVASTATIN 40 MILLIGRAM(S): 20 TABLET, FILM COATED ORAL at 22:01

## 2020-10-11 RX ADMIN — GABAPENTIN 100 MILLIGRAM(S): 400 CAPSULE ORAL at 22:00

## 2020-10-11 NOTE — ED PROVIDER NOTE - PHYSICAL EXAMINATION
CONST: Well appearing in NAD  EYES: PERRL, EOMI, Sclera and conjunctiva clear.   ENT: No nasal discharge.  Oropharynx normal appearing, no erythema or exudates. No abscess or swelling. Uvula midline.   NECK: Non-tender, no meningeal signs. normal ROM. supple   CARD: Normal S1 S2; Normal rate and rhythm  RESP: Equal BS B/L, No wheezes, rhonchi or rales. No distress  GI: Soft, non-tender, non-distended. no cva tenderness. normal BS  MS: + paraspinal tenderness throughout back, Normal ROM in all extremities. No midline spinal tenderness. pulses 2 +. no calf tenderness or swelling  SKIN: Warm, dry, no acute rashes. Good turgor  NEURO: A&Ox3, No focal deficits. Strength 5/5 with no sensory deficits. Steady gait. Finger to nose intact. Negative pronator drift

## 2020-10-11 NOTE — H&P ADULT - ASSESSMENT
55 year old female with medical history of HTN, Depression, Anxiety, drug use, fibroids, and chronic back pain, admitted for mechanical fall, found to have UTI, proctitis along with sciatica.    # Mechanical fall with Back pain/sciatica/muscle spasms  - chronic back pain, along with radiation to right leg,   - straight leg raise test positive,   - gabapentin 100 milliGRAM(s) Oral three times a day  - PT/OT  - Lidoderm patch, prn percocet  - pain management  - Fu MRI result    # UTI/Proctitis  - Rocephin, SIRS ruled out on admission    # Depression/anxiety  - escitalopram 10 milliGRAM(s) Oral daily  - QUEtiapine 100 milliGRAM(s) Oral two times a day    # HTN  - hydrochlorothiazide 12.5 milliGRAM(s) Oral daily  - losartan 100 milliGRAM(s) Oral daily  - metoprolol tartrate 100 milliGRAM(s) Oral two times a day    # GERD  - pantoprazole    Tablet 40 milliGRAM(s) Oral before breakfast    # constipation  - senna 2 Tablet(s) Oral at bedtime PRN    # HLD  - simvastatin 40 milliGRAM(s) Oral at bedtime    #) MISC  Activity: IAT  DVT ppx: lovenox   GI ppx: protonix  Diet: DASH/TLC  Code: Full  Dispo: medical optimization  CHG wash

## 2020-10-11 NOTE — ED PROVIDER NOTE - NS ED ROS FT
Review of Systems:  	•	CONSTITUTIONAL - no fever, no diaphoresis, no chills  	•	SKIN - no rash  	•	HEMATOLOGIC - no bleeding, no bruising  	•	EYES - no eye pain, no blurry vision  	•	ENT - no change in hearing, no sore throat, no ear pain or tinnitus  	•	RESPIRATORY - no shortness of breath, no cough  	•	CARDIAC - no chest pain, no palpitations  	•	GI - no abd pain, no nausea, no vomiting, no diarrhea, no constipation  	•	GENITO-URINARY - no discharge, no dysuria; no hematuria, no increased urinary frequency  	•	MUSCULOSKELETAL - + right knee, neck and back pain. no swelling, no redness  	•	NEUROLOGIC - no weakness, no headache, no paresthesias, no LOC  	•	PSYCH - no anxiety, non suicidal, non homicidal, no hallucination, no depression

## 2020-10-11 NOTE — H&P ADULT - NSHPPHYSICALEXAM_GEN_ALL_CORE
GENERAL: NAD, well-developed, AAOx3  HEENT:  Atraumatic, Normocephalic. EOMI, PERRLA, conjunctiva and sclera clear, No JVD  PULMONARY: Clear to auscultation bilaterally; No wheeze  CARDIOVASCULAR: Regular rate and rhythm; No murmurs, rubs, or gallops  GASTROINTESTINAL: Soft, Nontender, Nondistended; Bowel sounds present  BACK: mild tenderness present. Straight leg raise test positive on right leg  MUSCULOSKELETAL:  2+ Peripheral Pulses, No clubbing, cyanosis, or edema  NEUROLOGY: non-focal  SKIN: No rashes or lesions

## 2020-10-11 NOTE — H&P ADULT - NSHPLABSRESULTS_GEN_ALL_CORE
cbc                        11.2   5.21  )-----------( 91       ( 11 Oct 2020 16:55 )             34.6     10-11    139  |  103  |  12  ----------------------------<  111<H>  4.2   |  27  |  0.6<L>    Ca    8.7      11 Oct 2020 16:55    TPro  8.4<H>  /  Alb  3.4<L>  /  TBili  0.5  /  DBili  x   /  AST  106<H>  /  ALT  83<H>  /  AlkPhos  83  10-11    < from: CT Abdomen and Pelvis w/ IV Cont (10.11.20 @ 17:21) >      IMPRESSION:    Perirectal stranding, correlate for proctitis.    No further CT evidence of acute intra-thoracic or intra-abdominal pathology.    Hepatic cirrhosis and chronic findings as above.    < end of copied text >    < from: CT Head No Cont (10.11.20 @ 17:16) >    Impression:    No evidence of acute intracranial pathology.    < end of copied text >    < from: CT Cervical Spine No Cont (10.11.20 @ 17:16) >    Impression:    No evidence of acute fracture or dislocation of the cervical spine.    < end of copied text >    < from: CT Chest w/ IV Cont (10.11.20 @ 17:21) >    IMPRESSION:    Perirectal stranding, correlate for proctitis.    No further CT evidence of acute intra-thoracic or intra-abdominal pathology.    Hepatic cirrhosis and chronic findings as above.    < end of copied text >
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
## 2020-10-11 NOTE — ED PROVIDER NOTE - CARE PLAN
Principal Discharge DX:	Frequent falls  Secondary Diagnosis:	Ambulatory dysfunction   Principal Discharge DX:	Frequent falls  Secondary Diagnosis:	Ambulatory dysfunction  Secondary Diagnosis:	Proctitis   Principal Discharge DX:	Frequent falls  Secondary Diagnosis:	Sciatica  Secondary Diagnosis:	Ambulatory dysfunction
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
## 2020-10-11 NOTE — ED PROVIDER NOTE - CLINICAL SUMMARY MEDICAL DECISION MAKING FREE TEXT BOX
Patient presented s/p multiple falls over the past week 2/2 generalized weakness and lower back pain suggestive of sciatica. Otherwise afebrile, HD stable, neurovascularly intact. Obtained labs which were grossly unremarkable including no significant leukocytosis, anemia, signs of dehydration/BRADLEY or significant electrolyte abnormalities. Pan scan for trauma negative for acute traumatic injury. Patient unable to ambulate and is complaining of persistent pain radiating down both legs, suggestive of sciatica and is fall risk at home. Therefore will require admission for further pain control, PT eval. Patient agreeable with plan. HD stable at time of admission.

## 2020-10-11 NOTE — ED ADULT NURSE NOTE - NSIMPLEMENTINTERV_GEN_ALL_ED
Implemented All Fall with Harm Risk Interventions:  [place] to call system. Call bell, personal items and telephone within reach. Instruct patient to call for assistance. Room bathroom lighting operational. Non-slip footwear when patient is off stretcher. Physically safe environment: no spills, clutter or unnecessary equipment. Stretcher in lowest position, wheels locked, appropriate side rails in place. Provide visual cue, wrist band, yellow gown, etc. Monitor gait and stability. Monitor for mental status changes and reorient to person, place, and time. Review medications for side effects contributing to fall risk. Reinforce activity limits and safety measures with patient and family. Provide visual clues: red socks.
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
## 2020-10-12 LAB
ALBUMIN SERPL ELPH-MCNC: 3.1 G/DL — LOW (ref 3.5–5.2)
ALP SERPL-CCNC: 82 U/L — SIGNIFICANT CHANGE UP (ref 30–115)
ALT FLD-CCNC: 87 U/L — HIGH (ref 0–41)
AMPHET UR-MCNC: NEGATIVE — SIGNIFICANT CHANGE UP
ANION GAP SERPL CALC-SCNC: 8 MMOL/L — SIGNIFICANT CHANGE UP (ref 7–14)
APTT BLD: 41.5 SEC — HIGH (ref 27–39.2)
AST SERPL-CCNC: 124 U/L — HIGH (ref 0–41)
BARBITURATES UR SCN-MCNC: NEGATIVE — SIGNIFICANT CHANGE UP
BASOPHILS # BLD AUTO: 0.02 K/UL — SIGNIFICANT CHANGE UP (ref 0–0.2)
BASOPHILS NFR BLD AUTO: 0.4 % — SIGNIFICANT CHANGE UP (ref 0–1)
BENZODIAZ UR-MCNC: POSITIVE
BILIRUB SERPL-MCNC: 0.4 MG/DL — SIGNIFICANT CHANGE UP (ref 0.2–1.2)
BUN SERPL-MCNC: 10 MG/DL — SIGNIFICANT CHANGE UP (ref 10–20)
CALCIUM SERPL-MCNC: 8.3 MG/DL — LOW (ref 8.5–10.1)
CHLORIDE SERPL-SCNC: 104 MMOL/L — SIGNIFICANT CHANGE UP (ref 98–110)
CHOLEST SERPL-MCNC: 68 MG/DL — LOW (ref 100–200)
CO2 SERPL-SCNC: 24 MMOL/L — SIGNIFICANT CHANGE UP (ref 17–32)
COCAINE METAB.OTHER UR-MCNC: NEGATIVE — SIGNIFICANT CHANGE UP
CREAT SERPL-MCNC: 0.5 MG/DL — LOW (ref 0.7–1.5)
EOSINOPHIL # BLD AUTO: 0.1 K/UL — SIGNIFICANT CHANGE UP (ref 0–0.7)
EOSINOPHIL NFR BLD AUTO: 1.8 % — SIGNIFICANT CHANGE UP (ref 0–8)
GLUCOSE BLDC GLUCOMTR-MCNC: 102 MG/DL — HIGH (ref 70–99)
GLUCOSE SERPL-MCNC: 111 MG/DL — HIGH (ref 70–99)
HCT VFR BLD CALC: 34.4 % — LOW (ref 37–47)
HDLC SERPL-MCNC: 27 MG/DL — LOW
HGB BLD-MCNC: 11.2 G/DL — LOW (ref 12–16)
IMM GRANULOCYTES NFR BLD AUTO: 0.4 % — HIGH (ref 0.1–0.3)
INR BLD: 1.31 RATIO — HIGH (ref 0.65–1.3)
LIPID PNL WITH DIRECT LDL SERPL: 25 MG/DL — SIGNIFICANT CHANGE UP (ref 4–129)
LYMPHOCYTES # BLD AUTO: 2.69 K/UL — SIGNIFICANT CHANGE UP (ref 1.2–3.4)
LYMPHOCYTES # BLD AUTO: 48.2 % — SIGNIFICANT CHANGE UP (ref 20.5–51.1)
MAGNESIUM SERPL-MCNC: 1.5 MG/DL — LOW (ref 1.8–2.4)
MCHC RBC-ENTMCNC: 31.1 PG — HIGH (ref 27–31)
MCHC RBC-ENTMCNC: 32.6 G/DL — SIGNIFICANT CHANGE UP (ref 32–37)
MCV RBC AUTO: 95.6 FL — SIGNIFICANT CHANGE UP (ref 81–99)
METHADONE UR-MCNC: NEGATIVE — SIGNIFICANT CHANGE UP
MONOCYTES # BLD AUTO: 0.48 K/UL — SIGNIFICANT CHANGE UP (ref 0.1–0.6)
MONOCYTES NFR BLD AUTO: 8.6 % — SIGNIFICANT CHANGE UP (ref 1.7–9.3)
MRSA PCR RESULT.: NEGATIVE — SIGNIFICANT CHANGE UP
NEUTROPHILS # BLD AUTO: 2.27 K/UL — SIGNIFICANT CHANGE UP (ref 1.4–6.5)
NEUTROPHILS NFR BLD AUTO: 40.6 % — LOW (ref 42.2–75.2)
NRBC # BLD: 0 /100 WBCS — SIGNIFICANT CHANGE UP (ref 0–0)
OPIATES UR-MCNC: POSITIVE
PCP SPEC-MCNC: SIGNIFICANT CHANGE UP
PHOSPHATE SERPL-MCNC: 3.8 MG/DL — SIGNIFICANT CHANGE UP (ref 2.1–4.9)
PLATELET # BLD AUTO: 75 K/UL — LOW (ref 130–400)
POTASSIUM SERPL-MCNC: 4.5 MMOL/L — SIGNIFICANT CHANGE UP (ref 3.5–5)
POTASSIUM SERPL-SCNC: 4.5 MMOL/L — SIGNIFICANT CHANGE UP (ref 3.5–5)
PROPOXYPHENE QUALITATIVE URINE RESULT: NEGATIVE — SIGNIFICANT CHANGE UP
PROT SERPL-MCNC: 7.8 G/DL — SIGNIFICANT CHANGE UP (ref 6–8)
PROTHROM AB SERPL-ACNC: 15.1 SEC — HIGH (ref 9.95–12.87)
RBC # BLD: 3.6 M/UL — LOW (ref 4.2–5.4)
RBC # FLD: 15.1 % — HIGH (ref 11.5–14.5)
SARS-COV-2 IGG SERPL QL IA: NEGATIVE — SIGNIFICANT CHANGE UP
SARS-COV-2 IGM SERPL IA-ACNC: 3.86 AU/ML — SIGNIFICANT CHANGE UP
SARS-COV-2 RNA SPEC QL NAA+PROBE: SIGNIFICANT CHANGE UP
SODIUM SERPL-SCNC: 136 MMOL/L — SIGNIFICANT CHANGE UP (ref 135–146)
TOTAL CHOLESTEROL/HDL RATIO MEASUREMENT: 2.5 RATIO — LOW (ref 4–5.5)
TRIGL SERPL-MCNC: 82 MG/DL — SIGNIFICANT CHANGE UP (ref 10–149)
WBC # BLD: 5.58 K/UL — SIGNIFICANT CHANGE UP (ref 4.8–10.8)
WBC # FLD AUTO: 5.58 K/UL — SIGNIFICANT CHANGE UP (ref 4.8–10.8)

## 2020-10-12 PROCEDURE — 99233 SBSQ HOSP IP/OBS HIGH 50: CPT

## 2020-10-12 RX ORDER — MAGNESIUM SULFATE 500 MG/ML
1 VIAL (ML) INJECTION ONCE
Refills: 0 | Status: COMPLETED | OUTPATIENT
Start: 2020-10-12 | End: 2020-10-12

## 2020-10-12 RX ORDER — BUPRENORPHINE AND NALOXONE 2; .5 MG/1; MG/1
2 TABLET SUBLINGUAL DAILY
Refills: 0 | Status: DISCONTINUED | OUTPATIENT
Start: 2020-10-12 | End: 2020-10-14

## 2020-10-12 RX ORDER — BUPRENORPHINE AND NALOXONE 2; .5 MG/1; MG/1
1 TABLET SUBLINGUAL
Refills: 0 | Status: DISCONTINUED | OUTPATIENT
Start: 2020-10-12 | End: 2020-10-12

## 2020-10-12 RX ORDER — BUPRENORPHINE AND NALOXONE 2; .5 MG/1; MG/1
2 TABLET SUBLINGUAL DAILY
Refills: 0 | Status: DISCONTINUED | OUTPATIENT
Start: 2020-10-12 | End: 2020-10-12

## 2020-10-12 RX ORDER — GABAPENTIN 400 MG/1
200 CAPSULE ORAL THREE TIMES A DAY
Refills: 0 | Status: DISCONTINUED | OUTPATIENT
Start: 2020-10-12 | End: 2020-10-13

## 2020-10-12 RX ORDER — MAGNESIUM SULFATE 500 MG/ML
2 VIAL (ML) INJECTION ONCE
Refills: 0 | Status: COMPLETED | OUTPATIENT
Start: 2020-10-12 | End: 2020-10-12

## 2020-10-12 RX ADMIN — Medication 100 MILLIGRAM(S): at 05:10

## 2020-10-12 RX ADMIN — Medication 50 GRAM(S): at 15:40

## 2020-10-12 RX ADMIN — Medication 12.5 MILLIGRAM(S): at 05:10

## 2020-10-12 RX ADMIN — GABAPENTIN 200 MILLIGRAM(S): 400 CAPSULE ORAL at 21:26

## 2020-10-12 RX ADMIN — LOSARTAN POTASSIUM 100 MILLIGRAM(S): 100 TABLET, FILM COATED ORAL at 05:10

## 2020-10-12 RX ADMIN — BUPRENORPHINE AND NALOXONE 2 TABLET(S): 2; .5 TABLET SUBLINGUAL at 11:12

## 2020-10-12 RX ADMIN — CEFTRIAXONE 100 MILLIGRAM(S): 500 INJECTION, POWDER, FOR SOLUTION INTRAMUSCULAR; INTRAVENOUS at 23:00

## 2020-10-12 RX ADMIN — ENOXAPARIN SODIUM 40 MILLIGRAM(S): 100 INJECTION SUBCUTANEOUS at 21:26

## 2020-10-12 RX ADMIN — CHLORHEXIDINE GLUCONATE 1 APPLICATION(S): 213 SOLUTION TOPICAL at 05:10

## 2020-10-12 RX ADMIN — QUETIAPINE FUMARATE 100 MILLIGRAM(S): 200 TABLET, FILM COATED ORAL at 17:54

## 2020-10-12 RX ADMIN — Medication 100 MILLIGRAM(S): at 17:54

## 2020-10-12 RX ADMIN — GABAPENTIN 100 MILLIGRAM(S): 400 CAPSULE ORAL at 05:10

## 2020-10-12 RX ADMIN — Medication 50 GRAM(S): at 11:11

## 2020-10-12 RX ADMIN — GABAPENTIN 200 MILLIGRAM(S): 400 CAPSULE ORAL at 13:24

## 2020-10-12 RX ADMIN — QUETIAPINE FUMARATE 100 MILLIGRAM(S): 200 TABLET, FILM COATED ORAL at 05:10

## 2020-10-12 RX ADMIN — ESCITALOPRAM OXALATE 10 MILLIGRAM(S): 10 TABLET, FILM COATED ORAL at 11:11

## 2020-10-12 RX ADMIN — SIMVASTATIN 40 MILLIGRAM(S): 20 TABLET, FILM COATED ORAL at 21:26

## 2020-10-12 RX ADMIN — PANTOPRAZOLE SODIUM 40 MILLIGRAM(S): 20 TABLET, DELAYED RELEASE ORAL at 05:10
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
## 2020-10-12 NOTE — PROGRESS NOTE ADULT - SUBJECTIVE AND OBJECTIVE BOX
ANA FERRELL  55y  Female      Patient is a 55y old  Female who presents with a chief complaint of Back pain, found to have UTI and proctitis (12 Oct 2020 13:56). patient is aggravated refusing to engage further with me at this time      REVIEW OF SYSTEMS:  limited as above  All other review of systems negative    T(C): 37.2 (10-12-20 @ 10:44), Max: 37.2 (10-12-20 @ 10:44)  HR: 63 (10-12-20 @ 10:44) (63 - 79)  BP: 130/62 (10-12-20 @ 10:44) (130/62 - 213/89)  RR: 18 (10-12-20 @ 10:44) (18 - 18)  SpO2: 98% (10-12-20 @ 10:44) (98% - 100%)  Wt(kg): --Vital Signs Last 24 Hrs  T(C): 37.2 (12 Oct 2020 10:44), Max: 37.2 (12 Oct 2020 10:44)  T(F): 98.9 (12 Oct 2020 10:44), Max: 98.9 (12 Oct 2020 10:44)  HR: 63 (12 Oct 2020 10:44) (63 - 79)  BP: 130/62 (12 Oct 2020 10:44) (130/62 - 213/89)  BP(mean): 121 (12 Oct 2020 00:44) (121 - 133)  RR: 18 (12 Oct 2020 10:44) (18 - 18)  SpO2: 98% (12 Oct 2020 10:44) (98% - 100%)        PHYSICAL EXAM:  GENERAL: NAD  PSYCH: annoyed  NERVOUS SYSTEM:  no obvious gross focal neurologic deficits  PULMONARY: defers auscultation at this time  CARDIOVASCULAR: defers auscultation at this time  GI: defers auscultation/ palptation at this time  EXTREMITIES:  no obvious edema appreciated     Consultant(s) Notes Reviewed:  [x ] YES  [ ] NO    Discussed with Consultants/Other Providers [ x] YES     LABS                          11.2   5.58  )-----------( 75       ( 12 Oct 2020 07:22 )             34.4     10-12    136  |  104  |  10  ----------------------------<  111<H>  4.5   |  24  |  0.5<L>    Ca    8.3<L>      12 Oct 2020 07:22  Phos  3.8     10-12  Mg     1.5     10-12    TPro  7.8  /  Alb  3.1<L>  /  TBili  0.4  /  DBili  x   /  AST  124<H>  /  ALT  87<H>  /  AlkPhos  82  10-12      Urinalysis Basic - ( 11 Oct 2020 18:39 )    Color: Yellow / Appearance: Clear / S.036 / pH: x  Gluc: x / Ketone: Negative  / Bili: Negative / Urobili: <2 mg/dL   Blood: x / Protein: Trace / Nitrite: Negative   Leuk Esterase: Moderate / RBC: 4 /HPF / WBC 17 /HPF   Sq Epi: x / Non Sq Epi: 2 /HPF / Bacteria: Many      PT/INR - ( 12 Oct 2020 07:22 )   PT: 15.10 sec;   INR: 1.31 ratio         PTT - ( 12 Oct 2020 07:22 )  PTT:41.5 sec  Lactate Trend    CARDIAC MARKERS ( 11 Oct 2020 16:55 )  x     / <0.01 ng/mL / x     / x     / x          CAPILLARY BLOOD GLUCOSE            RADIOLOGY & ADDITIONAL TESTS:    Imaging Personally Reviewed:  [ ] YES  [ ] NO    HEALTH ISSUES - PROBLEM Dx:

## 2020-10-12 NOTE — PROGRESS NOTE ADULT - ASSESSMENT
55 year old female with medical history of cirrhosis, hepatitis C, HTN, Depression, Anxiety, drug use, fibroids, and chronic back pain, admitted for mechanical fall, found to have UTI, proctitis along with sciatica.    # Mechanical fall with Back pain/sciatica/muscle spasms  - Lumbar MRI showing 1.5 lesion right paraspinal DDx small hematoma or abscess; however no particular point tenderness on exam of right paraspinal musculature  - Denies significant alcohol use.  - Chronic back pain, along with radiation to right leg, straight leg raise test positive  - C/w gabapentin 200 mg TID  - PT/OT  - Lidoderm patch. Percocet PRN. C/w home Suboxone 2-8 x2 once daily.  - Pain management    # UTI/Proctitis  - Rocephin, SIRS ruled out on admission    # Cirrhosis, hepatitis C  - Cirrhosis seen on CT abdomen. Was incidentally found last admission, also HCV reactive. CEA 4.2 prior visit.  - Previous admission iron, A1AT, and ceruloplasmin were normal.  - Patient was to obtain EGD for variceal screening as outpatient as well as Q6mo abd US and AFP for HCC screening  - GI is consulted.    # Depression/anxiety  - C/w escitalopram and quetiapine    # HTN  - C/w HCTZ, losartan, metoprolol tartrate    # GERD  - C/w pantoprazole    # Constipation  - Bowel regimen.    # HLD  - C/w simvastatin 40 mg.    DVT PPx: Lovenox 40 mg s/c  GI PPX: Protonix 40 mg PO  Diet: DASH  Activity: Increase as tolerated  Dispo: from home  Code Status: full code 55 year old female with medical history of cirrhosis, hepatitis C, HTN, Depression, Anxiety, drug use, fibroids, and chronic back pain, admitted for mechanical fall, found to have UTI, proctitis along with sciatica.    # Mechanical fall with Back pain/sciatica/muscle spasms  - Lumbar MRI showing 1.5 lesion right paraspinal DDx small hematoma or abscess; however no particular point tenderness on exam of right paraspinal musculature  - Denies significant alcohol use.  - Chronic back pain, along with radiation to right leg, straight leg raise test positive  - C/w gabapentin 200 mg TID  - PT/OT  - Lidoderm patch. Percocet PRN. C/w home Suboxone 2-8 x2 once daily.  - Pain management    # UTI/Proctitis  - C/w ceftriaxone q24h  - F/u UCx, BCx    # Cirrhosis, hepatitis C  - Cirrhosis seen on CT abdomen. Was incidentally found last admission, also HCV reactive. CEA 4.2 prior visit.  - Previous admission iron, A1AT, and ceruloplasmin were normal.  - Patient was to obtain EGD for variceal screening as outpatient as well as Q6mo abd US and AFP for HCC screening  - GI is consulted.    # Substance use disorder  - C/w Suboxone  - F/u urine drug screen  - Patient refused nicotine patch    # Depression/anxiety  - C/w escitalopram and quetiapine    # HTN  - C/w HCTZ, losartan, metoprolol tartrate    # GERD  - C/w pantoprazole    # Constipation  - Bowel regimen.    # HLD  - C/w simvastatin 40 mg.    DVT PPx: Lovenox 40 mg s/c  GI PPX: Protonix 40 mg PO  Diet: DASH  Activity: Increase as tolerated  Dispo: from home  Code Status: full code 55 year old female with medical history of cirrhosis, hepatitis C, HTN, Depression, Anxiety, drug use, fibroids, and chronic back pain, admitted for mechanical fall, found to have UTI, proctitis along with sciatica.    # Mechanical fall with Back pain/sciatica/muscle spasms  - Lumbar MRI showing 1.5 lesion right paraspinal DDx small hematoma or abscess; however no particular point tenderness on exam of right paraspinal musculature  - Denies significant alcohol use.  - Chronic back pain, along with radiation to right leg, straight leg raise test positive  - C/w gabapentin 200 mg TID  - PT/OT  - Lidoderm patch. C/w home Suboxone 2-8 x2 once daily.  - Pain management    # UTI/Proctitis  - C/w ceftriaxone q24h  - F/u UCx, BCx    # Cirrhosis, hepatitis C  - Cirrhosis seen on CT abdomen. Was incidentally found last admission, also HCV reactive. CEA 4.2 prior visit.  - Previous admission iron, A1AT, and ceruloplasmin were normal.  - Patient was to obtain EGD for variceal screening as outpatient as well as Q6mo abd US and AFP for HCC screening  - GI is consulted - no change to previous admission recs. Needs F/u in hepatology clinic.    # Substance use disorder  - C/w Suboxone  - F/u urine drug screen  - Patient refused nicotine patch    # Depression/anxiety  - C/w escitalopram and quetiapine    # HTN  - C/w HCTZ, losartan, metoprolol tartrate    # GERD  - C/w pantoprazole    # Constipation  - Bowel regimen.    # HLD  - C/w simvastatin 40 mg.    DVT PPx: Lovenox 40 mg s/c  GI PPX: Protonix 40 mg PO  Diet: DASH  Activity: Increase as tolerated  Dispo: from home  Code Status: full code

## 2020-10-13 ENCOUNTER — TRANSCRIPTION ENCOUNTER (OUTPATIENT)
Age: 55
End: 2020-10-13

## 2020-10-13 DIAGNOSIS — M54.9 DORSALGIA, UNSPECIFIED: ICD-10-CM

## 2020-10-13 LAB
ALBUMIN SERPL ELPH-MCNC: 2.8 G/DL — LOW (ref 3.5–5.2)
ALP SERPL-CCNC: 74 U/L — SIGNIFICANT CHANGE UP (ref 30–115)
ALT FLD-CCNC: 85 U/L — HIGH (ref 0–41)
ANION GAP SERPL CALC-SCNC: 10 MMOL/L — SIGNIFICANT CHANGE UP (ref 7–14)
AST SERPL-CCNC: 111 U/L — HIGH (ref 0–41)
BASOPHILS # BLD AUTO: 0.04 K/UL — SIGNIFICANT CHANGE UP (ref 0–0.2)
BASOPHILS NFR BLD AUTO: 0.7 % — SIGNIFICANT CHANGE UP (ref 0–1)
BILIRUB SERPL-MCNC: 0.5 MG/DL — SIGNIFICANT CHANGE UP (ref 0.2–1.2)
BUN SERPL-MCNC: 14 MG/DL — SIGNIFICANT CHANGE UP (ref 10–20)
CALCIUM SERPL-MCNC: 7.8 MG/DL — LOW (ref 8.5–10.1)
CHLORIDE SERPL-SCNC: 104 MMOL/L — SIGNIFICANT CHANGE UP (ref 98–110)
CO2 SERPL-SCNC: 24 MMOL/L — SIGNIFICANT CHANGE UP (ref 17–32)
CREAT SERPL-MCNC: 0.6 MG/DL — LOW (ref 0.7–1.5)
CULTURE RESULTS: SIGNIFICANT CHANGE UP
EOSINOPHIL # BLD AUTO: 0.1 K/UL — SIGNIFICANT CHANGE UP (ref 0–0.7)
EOSINOPHIL NFR BLD AUTO: 1.8 % — SIGNIFICANT CHANGE UP (ref 0–8)
GLUCOSE BLDC GLUCOMTR-MCNC: 88 MG/DL — SIGNIFICANT CHANGE UP (ref 70–99)
GLUCOSE SERPL-MCNC: 92 MG/DL — SIGNIFICANT CHANGE UP (ref 70–99)
HCT VFR BLD CALC: 34.4 % — LOW (ref 37–47)
HGB BLD-MCNC: 11.1 G/DL — LOW (ref 12–16)
IMM GRANULOCYTES NFR BLD AUTO: 0.2 % — SIGNIFICANT CHANGE UP (ref 0.1–0.3)
LYMPHOCYTES # BLD AUTO: 2.94 K/UL — SIGNIFICANT CHANGE UP (ref 1.2–3.4)
LYMPHOCYTES # BLD AUTO: 52.2 % — HIGH (ref 20.5–51.1)
MAGNESIUM SERPL-MCNC: 1.8 MG/DL — SIGNIFICANT CHANGE UP (ref 1.8–2.4)
MCHC RBC-ENTMCNC: 31.2 PG — HIGH (ref 27–31)
MCHC RBC-ENTMCNC: 32.3 G/DL — SIGNIFICANT CHANGE UP (ref 32–37)
MCV RBC AUTO: 96.6 FL — SIGNIFICANT CHANGE UP (ref 81–99)
MONOCYTES # BLD AUTO: 0.44 K/UL — SIGNIFICANT CHANGE UP (ref 0.1–0.6)
MONOCYTES NFR BLD AUTO: 7.8 % — SIGNIFICANT CHANGE UP (ref 1.7–9.3)
NEUTROPHILS # BLD AUTO: 2.1 K/UL — SIGNIFICANT CHANGE UP (ref 1.4–6.5)
NEUTROPHILS NFR BLD AUTO: 37.3 % — LOW (ref 42.2–75.2)
NRBC # BLD: 0 /100 WBCS — SIGNIFICANT CHANGE UP (ref 0–0)
PLATELET # BLD AUTO: 101 K/UL — LOW (ref 130–400)
POTASSIUM SERPL-MCNC: 4.3 MMOL/L — SIGNIFICANT CHANGE UP (ref 3.5–5)
POTASSIUM SERPL-SCNC: 4.3 MMOL/L — SIGNIFICANT CHANGE UP (ref 3.5–5)
PROT SERPL-MCNC: 7.4 G/DL — SIGNIFICANT CHANGE UP (ref 6–8)
RBC # BLD: 3.56 M/UL — LOW (ref 4.2–5.4)
RBC # FLD: 15 % — HIGH (ref 11.5–14.5)
SODIUM SERPL-SCNC: 138 MMOL/L — SIGNIFICANT CHANGE UP (ref 135–146)
SPECIMEN SOURCE: SIGNIFICANT CHANGE UP
WBC # BLD: 5.63 K/UL — SIGNIFICANT CHANGE UP (ref 4.8–10.8)
WBC # FLD AUTO: 5.63 K/UL — SIGNIFICANT CHANGE UP (ref 4.8–10.8)

## 2020-10-13 PROCEDURE — 99233 SBSQ HOSP IP/OBS HIGH 50: CPT

## 2020-10-13 RX ORDER — QUETIAPINE FUMARATE 200 MG/1
1 TABLET, FILM COATED ORAL
Qty: 0 | Refills: 0 | DISCHARGE

## 2020-10-13 RX ORDER — ESCITALOPRAM OXALATE 10 MG/1
1 TABLET, FILM COATED ORAL
Qty: 0 | Refills: 0 | DISCHARGE
Start: 2020-10-13

## 2020-10-13 RX ORDER — MAGNESIUM SULFATE 500 MG/ML
2 VIAL (ML) INJECTION ONCE
Refills: 0 | Status: COMPLETED | OUTPATIENT
Start: 2020-10-13 | End: 2020-10-13

## 2020-10-13 RX ORDER — ACETAMINOPHEN 500 MG
2 TABLET ORAL
Qty: 0 | Refills: 0 | DISCHARGE
Start: 2020-10-13

## 2020-10-13 RX ORDER — QUETIAPINE FUMARATE 200 MG/1
1 TABLET, FILM COATED ORAL
Qty: 0 | Refills: 0 | DISCHARGE
Start: 2020-10-13

## 2020-10-13 RX ORDER — CEFPODOXIME PROXETIL 100 MG
1 TABLET ORAL
Qty: 16 | Refills: 0
Start: 2020-10-13 | End: 2020-10-20

## 2020-10-13 RX ORDER — BUPRENORPHINE AND NALOXONE 2; .5 MG/1; MG/1
2 TABLET SUBLINGUAL
Qty: 0 | Refills: 0 | DISCHARGE
Start: 2020-10-13

## 2020-10-13 RX ORDER — GABAPENTIN 400 MG/1
300 CAPSULE ORAL THREE TIMES A DAY
Refills: 0 | Status: DISCONTINUED | OUTPATIENT
Start: 2020-10-13 | End: 2020-10-14

## 2020-10-13 RX ORDER — METHOCARBAMOL 500 MG/1
750 TABLET, FILM COATED ORAL EVERY 6 HOURS
Refills: 0 | Status: DISCONTINUED | OUTPATIENT
Start: 2020-10-13 | End: 2020-10-14

## 2020-10-13 RX ADMIN — Medication 100 MILLIGRAM(S): at 17:26

## 2020-10-13 RX ADMIN — QUETIAPINE FUMARATE 100 MILLIGRAM(S): 200 TABLET, FILM COATED ORAL at 17:26

## 2020-10-13 RX ADMIN — GABAPENTIN 300 MILLIGRAM(S): 400 CAPSULE ORAL at 11:46

## 2020-10-13 RX ADMIN — Medication 12.5 MILLIGRAM(S): at 05:46

## 2020-10-13 RX ADMIN — Medication 25 GRAM(S): at 14:50

## 2020-10-13 RX ADMIN — QUETIAPINE FUMARATE 100 MILLIGRAM(S): 200 TABLET, FILM COATED ORAL at 05:46

## 2020-10-13 RX ADMIN — ESCITALOPRAM OXALATE 10 MILLIGRAM(S): 10 TABLET, FILM COATED ORAL at 11:46

## 2020-10-13 RX ADMIN — PANTOPRAZOLE SODIUM 40 MILLIGRAM(S): 20 TABLET, DELAYED RELEASE ORAL at 05:46

## 2020-10-13 RX ADMIN — Medication 100 MILLIGRAM(S): at 05:46

## 2020-10-13 RX ADMIN — BUPRENORPHINE AND NALOXONE 2 TABLET(S): 2; .5 TABLET SUBLINGUAL at 12:03

## 2020-10-13 RX ADMIN — GABAPENTIN 200 MILLIGRAM(S): 400 CAPSULE ORAL at 05:46

## 2020-10-13 RX ADMIN — METHOCARBAMOL 750 MILLIGRAM(S): 500 TABLET, FILM COATED ORAL at 17:26

## 2020-10-13 RX ADMIN — METHOCARBAMOL 750 MILLIGRAM(S): 500 TABLET, FILM COATED ORAL at 11:46

## 2020-10-13 RX ADMIN — LOSARTAN POTASSIUM 100 MILLIGRAM(S): 100 TABLET, FILM COATED ORAL at 05:46

## 2020-10-13 NOTE — DISCHARGE NOTE PROVIDER - CARE PROVIDER_API CALL
BERHANE SULLIVAN  [id number]  [address] [place] [address]  Phone: [phone]  Fax: [phone]  Follow Up Time: 1 week
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
## 2020-10-13 NOTE — OCCUPATIONAL THERAPY INITIAL EVALUATION ADULT - LIVES WITH, PROFILE
Pt lives with her son in a private home with ~3STE no HR, and 1 flight to second floor to bedroom bathroom with walk-in shower/children

## 2020-10-13 NOTE — DISCHARGE NOTE PROVIDER - NSDCFUADDINST_GEN_ALL_CORE_FT
Please follow up with your regular doctor for further care and health maintenance.   Please follow up with your regular doctor for further care and health maintenance.  Please follow up with a urologist for your bladder infection and a gastroenterologist for your history of hepatitis C. Please follow up with your regular doctor for further care and health maintenance.  Please follow up with a urologist for your bladder infection and a gastroenterologist for your history of hepatitis C.  Please call the outpatient clinic at [phone] to make an appointment with a gastroenterologist.

## 2020-10-13 NOTE — PROGRESS NOTE ADULT - REASON FOR ADMISSION
Back pain, found to have UTI and proctitis

## 2020-10-13 NOTE — OCCUPATIONAL THERAPY INITIAL EVALUATION ADULT - GENERAL OBSERVATIONS, REHAB EVAL
Pt encountered sitting EOB in NAD, +IV lock. c/o pain to lower back 8/10, Agreeable to OT alka, seen 8:00-8:25am

## 2020-10-13 NOTE — DISCHARGE NOTE NURSING/CASE MANAGEMENT/SOCIAL WORK - PATIENT PORTAL LINK FT
You can access the FollowMyHealth Patient Portal offered by [facility] by registering at the following website: [email or web address]. By joining [facility]’s FollowMyHealth portal, you will also be able to view your health information using other applications (apps) compatible with our system.
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
## 2020-10-13 NOTE — DISCHARGE NOTE PROVIDER - NSFOLLOWUPCLINICS_GEN_ALL_ED_FT
[facility]  Outpatient Clinic  [address] [place], [place]   Phone: [phone]  Fax:   Follow Up Time:     [facility]  Urology  .  [place]   Phone: [phone]  Fax:   Follow Up Time:

## 2020-10-13 NOTE — DISCHARGE NOTE PROVIDER - NSDCCPCAREPLAN_GEN_ALL_CORE_FT
PRINCIPAL DISCHARGE DIAGNOSIS  Diagnosis: Frequent falls  Assessment and Plan of Treatment: Fall prevention includes ways to make your home and other areas safer. It also includes ways you can move more carefully to prevent a fall. Health conditions that cause changes in your blood pressure, vision, or muscle strength and coordination may increase your risk for falls. Medicines may also increase your risk for falls if they make you dizzy, weak, or sleepy.  Seek Medical Attention If:  You have fallen and are unconscious.  fallen and cannot move part of your body.  You have fallen and have pain or a headache.  Fall prevention tips:  Stand or sit up slowly.  Use assistive devices as directed.   You may need to have grab bars put in your bathroom near the toilet or in the shower.  Wear shoes that fit well and have soles that . Wear shoes both inside and outside. Do not wear shoes with high heels.  Wear a personal alarm that can call 911 in an emergency.   Manage your medical conditions. Keep all appointments with your healthcare providers. Visit your eye doctor as directed.  Home safety tips:   Put nonslip strips on your bath or shower floor to prevent you from   Use a shower seat so you do not need to stand while you shower. Sit on the toilet or a chair in your bathroom to dry yourself and put on clothing. This will prevent you from losing your balance from drying or dressing yourself while you are standing.  Keep paths clear. Remove books, shoes, and other objects from walkways and stairs. Place cords for telephones and lamps out of the way so that you do not need to walk over them. Tape them down if you cannot move them. Remove small rugs or secure it with double-sided tape to prevent you from   Install bright lights in your home. Use night lights to help light paths to the bathroom or kitchen. Always turn on the light before you start walking.  Keep items you use often on shelves within reach. Do not use a step stool to help you reach an item.  Place reflective tape on the edges of your stairs. To see better.        SECONDARY DISCHARGE DIAGNOSES  Diagnosis: Substance abuse  Assessment and Plan of Treatment: When you presented to the hospital, you tested positive for benzo and opiate drug use. This most likely contributes to your multiple falls. Speak to your primary care doctor in regards to seeking help to stop your abuse of substances.    Diagnosis: Cystitis, acute  Assessment and Plan of Treatment: You were noted either on arrival or during this hospitalization to have a bladder Infection. You may have already been treated and completed the antibiotics, please refer to the list of medications present on your discharge paperwork. If you notice that there are antibiotics listed, these may be to treat your infection, be sure to complete taking the full course, whether you have symptoms or not, as prescribed.  While taking antibiotics, you may benefit from taking a probiotic such as florastore to help to try and prevent an infectious type of diarrhea known as C Diff. If you notice that you begin having severe watery diarrhea, more than 4-5 episodes a day, please see your Primary Care Doctor or come to the ER to have your stool tested for this infection.   It is not necessary to repeat a urine test to see if the infection is gone, it is assumed that after treatment it should have resolved. However, if you continue to have symptoms, please see your Primary Care doctor or return to the ER.      Diagnosis: Ambulatory dysfunction  Assessment and Plan of Treatment:

## 2020-10-13 NOTE — CONSULT NOTE ADULT - SUBJECTIVE AND OBJECTIVE BOX
Pain Management Consult Note  10-13-20 @ 15:15     55y female with medical history of HTN, Depression, Anxiety, illicit drug use, fibroids, and chronic back pain. Presented after mechanical fall. Complaining of lower back pain.    Allergies    Benadryl (Unknown)    Intolerances        PAST MEDICAL & SURGICAL HISTORY:  Hypertension    Panic attacks    Depression    Anxiety    H/O:     History of cholecystectomy        Home Medications:  gabapentin 100 mg oral capsule: 1 cap(s) orally 3 times a day (11 Dec 2018 15:40)  hydroCHLOROthiazide 12.5 mg oral capsule: 1 cap(s) orally once a day (19 Oct 2018 16:48)  losartan 100 mg oral tablet: 1 tab(s) orally once a day (23 Aug 2020 02:05)  Metoprolol Tartrate 100 mg oral tablet: 1 tab(s) orally 2 times a day (19 Oct 2018 16:48)  omeprazole 20 mg oral delayed release capsule: 1 cap(s) orally once a day (19 Oct 2018 16:48)  SEROquel 100 mg oral tablet: 1 tab(s) orally 2 times a day (23 Aug 2020 02:07)  simvastatin 40 mg oral tablet: 1 tab(s) orally once a day (at bedtime) (23 Aug 2020 02:05)      SOCIAL HISTORY:  Denies Smoking, Alcohol, or Drug Use    Benadryl (Unknown)      MEDICATIONS  (STANDING):  buprenorphine 8 mG/naloxone 2 mG SL  Tablet 2 Tablet(s) SubLingual daily  cefTRIAXone   IVPB 1000 milliGRAM(s) IV Intermittent every 24 hours  chlorhexidine 4% Liquid 1 Application(s) Topical <User Schedule>  enoxaparin Injectable 40 milliGRAM(s) SubCutaneous at bedtime  escitalopram 10 milliGRAM(s) Oral daily  gabapentin 300 milliGRAM(s) Oral three times a day  hydrochlorothiazide 12.5 milliGRAM(s) Oral daily  influenza   Vaccine 0.5 milliLiter(s) IntraMuscular once  losartan 100 milliGRAM(s) Oral daily  methocarbamol 750 milliGRAM(s) Oral every 6 hours  metoprolol tartrate 100 milliGRAM(s) Oral two times a day  pantoprazole    Tablet 40 milliGRAM(s) Oral before breakfast  QUEtiapine 100 milliGRAM(s) Oral two times a day  simvastatin 40 milliGRAM(s) Oral at bedtime    MEDICATIONS  (PRN):  acetaminophen   Tablet .. 650 milliGRAM(s) Oral every 6 hours PRN Temp greater or equal to 38C (100.4F), Mild Pain (1 - 3)  senna 2 Tablet(s) Oral at bedtime PRN Constipation      Vital Signs Last 24 Hrs  T(C): 36 (13 Oct 2020 13:31), Max: 36.7 (12 Oct 2020 19:51)  T(F): 96.8 (13 Oct 2020 13:31), Max: 98.1 (12 Oct 2020 19:51)  HR: 81 (13 Oct 2020 13:31) (60 - 82)  BP: 182/84 (13 Oct 2020 13:31) (122/80 - 182/84)  BP(mean): --  RR: 18 (13 Oct 2020 13:31) (18 - 18)  SpO2: 98% (13 Oct 2020 07:48) (94% - 98%)        LABS:                          11.1   5.63  )-----------( 101      ( 13 Oct 2020 06:27 )             34.4     10-13    138  |  104  |  14  ----------------------------<  92  4.3   |  24  |  0.6<L>    Ca    7.8<L>      13 Oct 2020 06:27  Phos  3.8     10-12  Mg     1.8     10-13    TPro  7.4  /  Alb  2.8<L>  /  TBili  0.5  /  DBili  x   /  AST  111<H>  /  ALT  85<H>  /  AlkPhos  74  10-13    LIVER FUNCTIONS - ( 13 Oct 2020 06:27 )  Alb: 2.8 g/dL / Pro: 7.4 g/dL / ALK PHOS: 74 U/L / ALT: 85 U/L / AST: 111 U/L / GGT: x           PT/INR - ( 12 Oct 2020 07:22 )   PT: 15.10 sec;   INR: 1.31 ratio         PTT - ( 12 Oct 2020 07:22 )  PTT:41.5 sec    Urinalysis Basic - ( 11 Oct 2020 18:39 )    Color: Yellow / Appearance: Clear / S.036 / pH: x  Gluc: x / Ketone: Negative  / Bili: Negative / Urobili: <2 mg/dL   Blood: x / Protein: Trace / Nitrite: Negative   Leuk Esterase: Moderate / RBC: 4 /HPF / WBC 17 /HPF   Sq Epi: x / Non Sq Epi: 2 /HPF / Bacteria: Many        RADIOLOGY:  EXAM:  MR SPINE LUMBAR            PROCEDURE DATE:  10/11/2020            INTERPRETATION:  Clinical History / Reason for exam: 5-year-old female with chronic lower back pain and fall.    Technique: Sagittal T1, T2, STIR and axial T1 and T2-weighted spin-echo sequences of the lumbar spine obtained on the 3 Pretty magnet.    Comparison studies: CT abdomen same date    FINDINGS:    The vertebral bodies are unremarkable in height and alignment.    There are multilevel mild degenerative changes with loss of disc space height and signal.    At L1-2, L2-3 and L3-4 the spinal canal and foramina are normally patent.    At L4-5 there is facet hypertrophy which is more pronounced on the right. The spinal canal and foramina are essentially normally patent.    At L5-S1 there are more pronounced degenerative change with loss of disc space height and disc desiccation. Disc bulging and facet hypertrophy contribute to foraminal stenosis greater on the left.    Marrow signal is normal. Conus is unremarkable. There are no intradural masses demonstrated.    There is a 1.3 cm rounded lesion within the right erector spinae muscles of the posterior paraspinal region. The lesion is of heterogeneous increased T1 and T2 signal. The signal changes are nonspecific. Although they can be seen with lipoma, no fat-containing lesion is correlated on the CT scan. Findings may reflect a small hematoma or abscess.    IMPRESSION:    1.  Chronic multilevel degenerative changes of the lumbar spine    2.  L5-S1: Severe left foraminal narrowing, due to disc bulging and facet hypertrophy    3.  All marginated, approximately  1.5 cm lesion right erector spinae muscle (paraspinal). Differential includes small hematoma or abscess. Not consistent with lipoma (CT available for comparison) Correlate clinically for site of pain. The well-defined margins suggest this may be chronic.                    MONET PALOMARES M.D., ATTENDING RADIOLOGIST  This document has been electronically signed. Oct 12 2020  8:47AM        Drug Screen:        [ ]  [place]  Reviewed on 10/13/20, 3:19P  Patient Name: Fatou Lema Date: 1965  Address: [address] [place] [id number]: Female  Rx Written	Rx Dispensed	Drug	Quantity	Days Supply	Prescriber Name  2020	buprenorphine-naloxone 8-2 mg sl film	52	26	Stephany Armstrong,CASTRO  Payment Method Insurance  Dispenser [facility]  2020	buprenorphine-naloxone 8-2 mg sl film	52	26	Stephany Armstrong,A  Payment Method Insurance  Dispenser [facility]  2020	buprenorphine-naloxone 8-2 mg sl film	14	7	Stephany Armstrong,A  Payment Method Insurance  Dispenser [facility]  2020	buprenorphine-naloxone 8-2 mg sl film	56	28	Kush Rowe MD  Payment Method Insurance  Dispenser [facility]    Patient Name: Fatou Lema Date: 1965  Address: [address] [place], [place] [address]Sex: Female  Rx Written	Rx Dispensed	Drug	Quantity	Days Supply	Prescriber Name  2020	buprenorphine-naloxone 8-2 mg sl film	56	28	Stephany Armstrong A  Payment Method Insurance  Dispenser [facility]  2020	buprenorphine-naloxone 8-2 mg sl film	56	28	Kush Rowe MD  Payment Method Insurance  Dispenser [facility]  2020	buprenorphine-naloxone 8-2 mg sl film	56	28	Kush Rowe MD  Payment Method Insurance  Dispenser [facility]  2020	buprenorphine-naloxone 8-2 mg sl tablet	14	7	Stephany Armstrong A  Payment Method Insurance  Dispenser [facility]  2020	buprenorphine-naloxone 8-2 mg sl film	56	28	Kush Rowe MD  Payment Method Insurance  Dispenser [facility]  2019	buprenorphine-naloxone 8-2 mg sl film	56	28	Stephany Armstrong A  Payment Method Insurance  Dispenser [facility]

## 2020-10-13 NOTE — CONSULT NOTE ADULT - PROBLEM SELECTOR RECOMMENDATION 9
Con't acetaminophen   Tablet .. 650 milliGRAM(s) Oral every 6 hours standing  Con't buprenorphine 8 mG/naloxone 2 mG SL  Tablet 2 Tablet(s) SubLingual daily.  NO OPIOIDS  Con't gabapentin 300 milliGRAM(s) Oral three times a day  Con't methocarbamol 750 milliGRAM(s) Oral every 6 hours  Start Toradol 30mg IVP/IM Q6hrs PRN x 5 days and DC

## 2020-10-13 NOTE — DISCHARGE NOTE PROVIDER - NSDCMRMEDTOKEN_GEN_ALL_CORE_FT
escitalopram 10 mg oral tablet: 1 tab(s) orally once a day x 14 days MDD:for depression  gabapentin 100 mg oral capsule: 1 cap(s) orally 3 times a day  hydroCHLOROthiazide 12.5 mg oral capsule: 1 cap(s) orally once a day  losartan 100 mg oral tablet: 1 tab(s) orally once a day  Metoprolol Tartrate 100 mg oral tablet: 1 tab(s) orally 2 times a day  omeprazole 20 mg oral delayed release capsule: 1 cap(s) orally once a day  senna oral tablet: 2 tab(s) orally once a day (at bedtime)  SEROquel 100 mg oral tablet: 1 tab(s) orally 2 times a day  simvastatin 40 mg oral tablet: 1 tab(s) orally once a day (at bedtime)   acetaminophen 325 mg oral tablet: 2 tab(s) orally every 6 hours, As needed, Temp greater or equal to 38C (100.4F), Mild Pain (1 - 3)  buprenorphine-naloxone 8 mg-2 mg sublingual tablet: 2 tab(s) sublingual once a day  escitalopram 10 mg oral tablet: 1 tab(s) orally once a day x 14 days MDD:for depression  escitalopram 10 mg oral tablet: 1 tab(s) orally once a day  gabapentin 100 mg oral capsule: 1 cap(s) orally 3 times a day  hydroCHLOROthiazide 12.5 mg oral capsule: 1 cap(s) orally once a day  losartan 100 mg oral tablet: 1 tab(s) orally once a day  Metoprolol Tartrate 100 mg oral tablet: 1 tab(s) orally 2 times a day  omeprazole 20 mg oral delayed release capsule: 1 cap(s) orally once a day  QUEtiapine 100 mg oral tablet: 1 tab(s) orally 2 times a day  senna oral tablet: 2 tab(s) orally once a day (at bedtime)  SEROquel 100 mg oral tablet: 1 tab(s) orally 2 times a day  simvastatin 40 mg oral tablet: 1 tab(s) orally once a day (at bedtime)   acetaminophen 325 mg oral tablet: 2 tab(s) orally every 6 hours, As needed, Temp greater or equal to 38C (100.4F), Mild Pain (1 - 3)  buprenorphine-naloxone 8 mg-2 mg sublingual tablet: 2 tab(s) sublingual once a day  cefpodoxime 200 mg oral tablet: 1 tab(s) orally 2 times a day, last day on 10/21  escitalopram 10 mg oral tablet: 1 tab(s) orally once a day  gabapentin 100 mg oral capsule: 1 cap(s) orally 3 times a day  hydroCHLOROthiazide 12.5 mg oral capsule: 1 cap(s) orally once a day  losartan 100 mg oral tablet: 1 tab(s) orally once a day  Metoprolol Tartrate 100 mg oral tablet: 1 tab(s) orally 2 times a day  omeprazole 20 mg oral delayed release capsule: 1 cap(s) orally once a day  QUEtiapine 100 mg oral tablet: 1 tab(s) orally 2 times a day  senna oral tablet: 2 tab(s) orally once a day (at bedtime)  simvastatin 40 mg oral tablet: 1 tab(s) orally once a day (at bedtime)

## 2020-10-13 NOTE — CONSULT NOTE ADULT - ASSESSMENT
REVIEW OF SYSTEMS    General:	NAD   Skin/Breast:	Neg  Ophthalmologic:	Neg  ENMT: Neg	  Respiratory and Thorax: Neg	  Cardiovascular:	Neg  Gastrointestinal:	Neg  Genitourinary:	Neg  Musculoskeletal:	chronic spinal deformities and lower back pain  Neurological:	Neg  Psychiatric:	Ex drug abuse, on Suboxone  Hematology/Lymphatics:	Neg  Endocrine:	Neg        PHYSICAL EXAM:    GENERAL: NAD, well-groomed, well-developed  HEAD:  Atraumatic, Normocephalic  EYES: EOMI, PERRLA, conjunctiva and sclera clear  ENMT: No tonsillar erythema, exudates, or enlargement; Moist mucous membranes, Good dentition, No lesions  NECK: Supple, No JVD, Normal thyroid  NERVOUS SYSTEM:  Alert & Oriented X3, Good concentration; Motor Strength 5/5 B/L upper and lower extremities. Patient with slow affect, slowed clear speech.   CHEST/LUNG: Clear to percussion bilaterally; No rales, rhonchi, wheezing, or rubs  HEART: Regular rate and rhythm; No murmurs, rubs, or gallops  ABDOMEN: Soft, Nontender, Nondistended; Bowel sounds present  EXTREMITIES:   No clubbing, cyanosis. Positive bilat foot and hands with edema  LYMPH: No lymphadenopathy noted  SKIN: Bilat feet and hand dirty      Patient lying in bed supine. Noted patient speaking slowly as if intoxicated. Affect in general is slow. No AMS. Patient denies taking any drugs, ETOH or smoking. Noted bilat hands and feet to have some swelling and dirty. Patient states she fell yesterday and now has pain to her lower back radiating to her right medial leg up to her right knee. Positive movement and sensation to bilat legs. No complaint of urinary or bladder incontinence. CT shows multiple level degenerative changes. Pain is 10/10. sharp and constant x1 year. Patient is on Suboxone, last filled on Sept 16, 2020.
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
## 2020-10-13 NOTE — PHYSICAL THERAPY INITIAL EVALUATION ADULT - CRITERIA FOR SKILLED THERAPEUTIC INTERVENTIONS
therapy frequency/functional limitations in following categories/rehab potential/anticipated equipment needs at discharge/impairments found/anticipated discharge recommendation/risk reduction/prevention/predicted duration of therapy intervention

## 2020-10-14 VITALS
TEMPERATURE: 96 F | SYSTOLIC BLOOD PRESSURE: 109 MMHG | HEART RATE: 90 BPM | DIASTOLIC BLOOD PRESSURE: 56 MMHG | WEIGHT: 78.26 LBS | RESPIRATION RATE: 18 BRPM | HEIGHT: 62 IN

## 2020-10-16 DIAGNOSIS — F41.9 ANXIETY DISORDER, UNSPECIFIED: ICD-10-CM

## 2020-10-16 DIAGNOSIS — K21.9 GASTRO-ESOPHAGEAL REFLUX DISEASE WITHOUT ESOPHAGITIS: ICD-10-CM

## 2020-10-16 DIAGNOSIS — E78.5 HYPERLIPIDEMIA, UNSPECIFIED: ICD-10-CM

## 2020-10-16 DIAGNOSIS — R16.1 SPLENOMEGALY, NOT ELSEWHERE CLASSIFIED: ICD-10-CM

## 2020-10-16 DIAGNOSIS — K62.89 OTHER SPECIFIED DISEASES OF ANUS AND RECTUM: ICD-10-CM

## 2020-10-16 DIAGNOSIS — D25.9 LEIOMYOMA OF UTERUS, UNSPECIFIED: ICD-10-CM

## 2020-10-16 DIAGNOSIS — Z87.828 PERSONAL HISTORY OF OTHER (HEALED) PHYSICAL INJURY AND TRAUMA: ICD-10-CM

## 2020-10-16 DIAGNOSIS — F17.210 NICOTINE DEPENDENCE, CIGARETTES, UNCOMPLICATED: ICD-10-CM

## 2020-10-16 DIAGNOSIS — F14.90 COCAINE USE, UNSPECIFIED, UNCOMPLICATED: ICD-10-CM

## 2020-10-16 DIAGNOSIS — F11.20 OPIOID DEPENDENCE, UNCOMPLICATED: ICD-10-CM

## 2020-10-16 DIAGNOSIS — I10 ESSENTIAL (PRIMARY) HYPERTENSION: ICD-10-CM

## 2020-10-16 DIAGNOSIS — M62.838 OTHER MUSCLE SPASM: ICD-10-CM

## 2020-10-16 DIAGNOSIS — R77.0 ABNORMALITY OF ALBUMIN: ICD-10-CM

## 2020-10-16 DIAGNOSIS — K59.00 CONSTIPATION, UNSPECIFIED: ICD-10-CM

## 2020-10-16 DIAGNOSIS — K74.60 UNSPECIFIED CIRRHOSIS OF LIVER: ICD-10-CM

## 2020-10-16 DIAGNOSIS — Z90.49 ACQUIRED ABSENCE OF OTHER SPECIFIED PARTS OF DIGESTIVE TRACT: ICD-10-CM

## 2020-10-16 DIAGNOSIS — R29.6 REPEATED FALLS: ICD-10-CM

## 2020-10-16 DIAGNOSIS — N30.00 ACUTE CYSTITIS WITHOUT HEMATURIA: ICD-10-CM

## 2020-10-16 DIAGNOSIS — M54.16 RADICULOPATHY, LUMBAR REGION: ICD-10-CM

## 2020-10-16 DIAGNOSIS — K76.6 PORTAL HYPERTENSION: ICD-10-CM

## 2020-10-16 DIAGNOSIS — M54.31 SCIATICA, RIGHT SIDE: ICD-10-CM

## 2020-10-16 DIAGNOSIS — B19.20 UNSPECIFIED VIRAL HEPATITIS C WITHOUT HEPATIC COMA: ICD-10-CM

## 2020-10-16 DIAGNOSIS — Z98.891 HISTORY OF UTERINE SCAR FROM PREVIOUS SURGERY: ICD-10-CM

## 2020-10-16 DIAGNOSIS — D69.59 OTHER SECONDARY THROMBOCYTOPENIA: ICD-10-CM

## 2020-10-16 DIAGNOSIS — Z88.8 ALLERGY STATUS TO OTHER DRUGS, MEDICAMENTS AND BIOLOGICAL SUBSTANCES: ICD-10-CM

## 2020-10-16 DIAGNOSIS — R74.01 ELEVATION OF LEVELS OF LIVER TRANSAMINASE LEVELS: ICD-10-CM

## 2020-10-16 DIAGNOSIS — G89.29 OTHER CHRONIC PAIN: ICD-10-CM

## 2020-10-16 DIAGNOSIS — F13.10 SEDATIVE, HYPNOTIC OR ANXIOLYTIC ABUSE, UNCOMPLICATED: ICD-10-CM

## 2020-10-16 DIAGNOSIS — F32.9 MAJOR DEPRESSIVE DISORDER, SINGLE EPISODE, UNSPECIFIED: ICD-10-CM

## 2020-10-16 DIAGNOSIS — R26.9 UNSPECIFIED ABNORMALITIES OF GAIT AND MOBILITY: ICD-10-CM

## 2020-10-16 DIAGNOSIS — M51.26 OTHER INTERVERTEBRAL DISC DISPLACEMENT, LUMBAR REGION: ICD-10-CM

## 2020-10-16 LAB
6-ACETYLMORPHINE, UR RESULT: NEGATIVE NG/ML — SIGNIFICANT CHANGE UP
6MAM UR CFM-MCNC: NEGATIVE NG/ML — SIGNIFICANT CHANGE UP
CODEINE UR CFM-MCNC: NEGATIVE NG/ML — SIGNIFICANT CHANGE UP
CODEINE, UR RESULT: NEGATIVE NG/ML — SIGNIFICANT CHANGE UP
HYDROCODONE UR QL CFM: NEGATIVE NG/ML — SIGNIFICANT CHANGE UP
HYDROCODONE, UR RESULT: NEGATIVE NG/ML — SIGNIFICANT CHANGE UP
HYDROMORPHONE UR QL CFM: NEGATIVE NG/ML — SIGNIFICANT CHANGE UP
HYDROMORPHONE, UR RESULT: NEGATIVE NG/ML — SIGNIFICANT CHANGE UP
MORPHINE UR QL CFM: 357 NG/ML — SIGNIFICANT CHANGE UP
MORPHINE, UR RESULT: 357 NG/ML — SIGNIFICANT CHANGE UP
NOROXYCODONE (OPIATES), UR RESULT: NEGATIVE NG/ML — SIGNIFICANT CHANGE UP
NOROXYCODONE UR CFM-MCNC: NEGATIVE NG/ML — SIGNIFICANT CHANGE UP
OPIATES IN-HOUSE INTERPRETATION: POSITIVE
OPIATES UR QL CFM: POSITIVE
OXYCODONE (OPIATES), UR RESULT: NEGATIVE NG/ML — SIGNIFICANT CHANGE UP
OXYCODONE UR-MCNC: NEGATIVE NG/ML — SIGNIFICANT CHANGE UP
OXYMORPHONE (OPIATES), UR RESULT: NEGATIVE NG/ML — SIGNIFICANT CHANGE UP
OXYMORPHONE UR CFM-MCNC: NEGATIVE NG/ML — SIGNIFICANT CHANGE UP

## 2020-10-17 ENCOUNTER — EMERGENCY (EMERGENCY)
Facility: HOSPITAL | Age: 55
LOS: 0 days | Discharge: HOME | End: 2020-10-18
Attending: EMERGENCY MEDICINE | Admitting: EMERGENCY MEDICINE
Payer: MEDICAID

## 2020-10-17 VITALS
DIASTOLIC BLOOD PRESSURE: 61 MMHG | SYSTOLIC BLOOD PRESSURE: 117 MMHG | WEIGHT: 220.02 LBS | TEMPERATURE: 98 F | OXYGEN SATURATION: 97 % | RESPIRATION RATE: 17 BRPM | HEIGHT: 64 IN | HEART RATE: 74 BPM

## 2020-10-17 DIAGNOSIS — F19.10 OTHER PSYCHOACTIVE SUBSTANCE ABUSE, UNCOMPLICATED: ICD-10-CM

## 2020-10-17 DIAGNOSIS — Z98.891 HISTORY OF UTERINE SCAR FROM PREVIOUS SURGERY: Chronic | ICD-10-CM

## 2020-10-17 DIAGNOSIS — Z90.49 ACQUIRED ABSENCE OF OTHER SPECIFIED PARTS OF DIGESTIVE TRACT: Chronic | ICD-10-CM

## 2020-10-17 DIAGNOSIS — I10 ESSENTIAL (PRIMARY) HYPERTENSION: ICD-10-CM

## 2020-10-17 DIAGNOSIS — F41.9 ANXIETY DISORDER, UNSPECIFIED: ICD-10-CM

## 2020-10-17 DIAGNOSIS — R45.851 SUICIDAL IDEATIONS: ICD-10-CM

## 2020-10-17 DIAGNOSIS — Z88.1 ALLERGY STATUS TO OTHER ANTIBIOTIC AGENTS STATUS: ICD-10-CM

## 2020-10-17 LAB
ALBUMIN SERPL ELPH-MCNC: 3.1 G/DL — LOW (ref 3.5–5.2)
ALP SERPL-CCNC: 81 U/L — SIGNIFICANT CHANGE UP (ref 30–115)
ALT FLD-CCNC: 61 U/L — HIGH (ref 0–41)
ANION GAP SERPL CALC-SCNC: 8 MMOL/L — SIGNIFICANT CHANGE UP (ref 7–14)
APAP SERPL-MCNC: <5 UG/ML — LOW (ref 10–30)
AST SERPL-CCNC: 60 U/L — HIGH (ref 0–41)
BASOPHILS # BLD AUTO: 0.02 K/UL — SIGNIFICANT CHANGE UP (ref 0–0.2)
BASOPHILS NFR BLD AUTO: 0.2 % — SIGNIFICANT CHANGE UP (ref 0–1)
BILIRUB DIRECT SERPL-MCNC: 0.2 MG/DL — SIGNIFICANT CHANGE UP (ref 0–0.2)
BILIRUB INDIRECT FLD-MCNC: 0.4 MG/DL — SIGNIFICANT CHANGE UP (ref 0.2–1.2)
BILIRUB SERPL-MCNC: 0.6 MG/DL — SIGNIFICANT CHANGE UP (ref 0.2–1.2)
BUN SERPL-MCNC: 23 MG/DL — HIGH (ref 10–20)
CALCIUM SERPL-MCNC: 7.9 MG/DL — LOW (ref 8.5–10.1)
CHLORIDE SERPL-SCNC: 102 MMOL/L — SIGNIFICANT CHANGE UP (ref 98–110)
CK SERPL-CCNC: 1085 U/L — HIGH (ref 0–225)
CO2 SERPL-SCNC: 26 MMOL/L — SIGNIFICANT CHANGE UP (ref 17–32)
CREAT SERPL-MCNC: 0.9 MG/DL — SIGNIFICANT CHANGE UP (ref 0.7–1.5)
EOSINOPHIL # BLD AUTO: 0.11 K/UL — SIGNIFICANT CHANGE UP (ref 0–0.7)
EOSINOPHIL NFR BLD AUTO: 0.9 % — SIGNIFICANT CHANGE UP (ref 0–8)
ETHANOL SERPL-MCNC: <10 MG/DL — SIGNIFICANT CHANGE UP
GLUCOSE SERPL-MCNC: 132 MG/DL — HIGH (ref 70–99)
HCG SERPL QL: NEGATIVE — SIGNIFICANT CHANGE UP
HCT VFR BLD CALC: 32 % — LOW (ref 37–47)
HGB BLD-MCNC: 10.6 G/DL — LOW (ref 12–16)
IMM GRANULOCYTES NFR BLD AUTO: 0.3 % — SIGNIFICANT CHANGE UP (ref 0.1–0.3)
LIDOCAIN IGE QN: 32 U/L — SIGNIFICANT CHANGE UP (ref 7–60)
LYMPHOCYTES # BLD AUTO: 28.8 % — SIGNIFICANT CHANGE UP (ref 20.5–51.1)
LYMPHOCYTES # BLD AUTO: 3.42 K/UL — HIGH (ref 1.2–3.4)
MCHC RBC-ENTMCNC: 31.7 PG — HIGH (ref 27–31)
MCHC RBC-ENTMCNC: 33.1 G/DL — SIGNIFICANT CHANGE UP (ref 32–37)
MCV RBC AUTO: 95.8 FL — SIGNIFICANT CHANGE UP (ref 81–99)
MONOCYTES # BLD AUTO: 0.83 K/UL — HIGH (ref 0.1–0.6)
MONOCYTES NFR BLD AUTO: 7 % — SIGNIFICANT CHANGE UP (ref 1.7–9.3)
NEUTROPHILS # BLD AUTO: 7.44 K/UL — HIGH (ref 1.4–6.5)
NEUTROPHILS NFR BLD AUTO: 62.8 % — SIGNIFICANT CHANGE UP (ref 42.2–75.2)
NRBC # BLD: 0 /100 WBCS — SIGNIFICANT CHANGE UP (ref 0–0)
PLATELET # BLD AUTO: 100 K/UL — LOW (ref 130–400)
POTASSIUM SERPL-MCNC: 3 MMOL/L — LOW (ref 3.5–5)
POTASSIUM SERPL-SCNC: 3 MMOL/L — LOW (ref 3.5–5)
PROT SERPL-MCNC: 8 G/DL — SIGNIFICANT CHANGE UP (ref 6–8)
RBC # BLD: 3.34 M/UL — LOW (ref 4.2–5.4)
RBC # FLD: 15 % — HIGH (ref 11.5–14.5)
SALICYLATES SERPL-MCNC: <0.3 MG/DL — LOW (ref 4–30)
SODIUM SERPL-SCNC: 136 MMOL/L — SIGNIFICANT CHANGE UP (ref 135–146)
TROPONIN T SERPL-MCNC: <0.01 NG/ML — SIGNIFICANT CHANGE UP
WBC # BLD: 11.86 K/UL — HIGH (ref 4.8–10.8)
WBC # FLD AUTO: 11.86 K/UL — HIGH (ref 4.8–10.8)

## 2020-10-17 PROCEDURE — 99285 EMERGENCY DEPT VISIT HI MDM: CPT

## 2020-10-17 PROCEDURE — 71045 X-RAY EXAM CHEST 1 VIEW: CPT | Mod: 26

## 2020-10-17 PROCEDURE — 93010 ELECTROCARDIOGRAM REPORT: CPT

## 2020-10-17 RX ORDER — SODIUM CHLORIDE 9 MG/ML
1000 INJECTION INTRAMUSCULAR; INTRAVENOUS; SUBCUTANEOUS ONCE
Refills: 0 | Status: COMPLETED | OUTPATIENT
Start: 2020-10-17 | End: 2020-10-17

## 2020-10-17 NOTE — ED BEHAVIORAL HEALTH ASSESSMENT NOTE - SAFETY PLAN DETAILS
patient did not engage in formal  Safety plan, however, stated that should her symptoms worse, she would reach out to her friend/neighbor, call 911 or return to the emergency room; patient provided with National Suicide Hotline as well patient refused to fill out formal  Safety plan, however, stated that should her symptoms worsen, she would reach out to her friend/neighbor, call 911 or return to the emergency room; patient provided with National Suicide Hotline as well

## 2020-10-17 NOTE — ED BEHAVIORAL HEALTH ASSESSMENT NOTE - SUICIDE RISK FACTORS
Access to lethal methods (pills, firearm, etc.: Ask specifically about presence or absence of a firearm in the home or ease of accessing/Alcohol/Substance abuse disorders
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
## 2020-10-17 NOTE — ED BEHAVIORAL HEALTH ASSESSMENT NOTE - MEDICATIONS (PRESCRIPTIONS, DIRECTIONS)
N/A; -patient refused PHP or Chemical Dependency Services at this time, however, please provide with referral for latter should patient change her mind: [facility]; [phone]; [address] [place], [place] [id number] N/A; -patient educated on signs and symptoms of acute benzo withdrawal and advised to return to ED if she experiences them
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
## 2020-10-17 NOTE — ED PROVIDER NOTE - PHYSICAL EXAMINATION
CONSTITUTIONAL: sad appearing woman, periodically crying in NAD  SKIN: Warm dry, normal skin turgor  HEAD: NCAT  EYES: EOMI, PERRLA, no scleral icterus, conjunctiva pink  ENT: normal pharynx with no erythema or exudates  NECK: Supple; non tender. Full ROM.  CARD: RRR, no murmurs.  RESP: clear to ausculation b/l. No crackles or wheezing.  ABD: soft, diffusely tender, non-distended, no rebound or guarding.  EXT: Full ROM, no bony tenderness, no pedal edema, no calf tenderness  NEURO: normal motor. normal sensory. CN II-XII intact. Cerebellar testing normal. Normal gait.  PSYCH: Cooperative, appropriate. CONSTITUTIONAL: sad appearing woman, periodically crying in NAD  SKIN: Warm dry, normal skin turgor  HEAD: NCAT  EYES: EOMI, PERRLA, no scleral icterus, conjunctiva pink  ENT: normal pharynx with no erythema or exudates  NECK: Supple; non tender. Full ROM.  CARD: RRR, no murmurs.  RESP: clear to ausculation b/l. No crackles or wheezing.  ABD: soft, diffusely tender, non-distended, no rebound or guarding.  EXT: Full ROM, no bony tenderness, no pedal edema, no calf tenderness  NEURO: normal motor. normal sensory. CN II-XII intact. Cerebellar testing normal. Normal gait.  PSYCH: depressed, but cooperative

## 2020-10-17 NOTE — ED PROVIDER NOTE - PROGRESS NOTE DETAILS
Consulted psychiatry on call, will evaluate patient. Patient was evaluated by psychiatry resident, pending further recommendations. BK: Patient evaluated by psychiatry; history is inconsistent, was at [facility] yesterday for fall and did not complain of suicidal ideation. Cleared for discharge by psych with referral for detox as long as patient is medically cleared. BK: Discussed case with toxicology fellow. As the patient claims to have ingested medications over 24 hours ago and does not have EKG changes, toxicology is signing off.

## 2020-10-17 NOTE — ED ADULT TRIAGE NOTE - CHIEF COMPLAINT QUOTE
"i'm so tired, I suffer from depression & I'm tired of fighting in this life" - patient   pt with suicidal thoughts, denies suicidal plan, denies homicidal ideations

## 2020-10-17 NOTE — ED ADULT NURSE REASSESSMENT NOTE - NS ED NURSE REASSESS COMMENT FT1
Pt changed into gown and jewlery, clothes placed in bag and sent to security. 1:1 in progress. Will continue to monitor.
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
## 2020-10-17 NOTE — ED BEHAVIORAL HEALTH ASSESSMENT NOTE - PAST PSYCHOTROPIC MEDICATION
patient does not recall, however, as per chart review, patient does not recall, however, as per chart review, Gabapentin, Seroquel, Celexa, Zoloft

## 2020-10-17 NOTE — ED BEHAVIORAL HEALTH ASSESSMENT NOTE - DESCRIPTION
Patient is alert, calm and cooperative with gown change, lab draws and interview. HTN, Fibroids, GERD HTN, Fibroids, GERD, Hep C, chronic back pain domiciled with friend and same house as her life long friend, unemployed,

## 2020-10-17 NOTE — ED PROVIDER NOTE - NSFOLLOWUPINSTRUCTIONS_ED_ALL_ED_FT
Suicidal Feelings: How to Help Yourself  Suicide is when you end your own life. There are many things you can do to help yourself feel better when struggling with these feelings. Many services and people are available to support you and others who struggle with similar feelings.     If you ever feel like you may hurt yourself or others, or have thoughts about taking your own life, get help right away. To get help:   Call your local emergency services (911 in the [place]   Go to your nearest emergency department.   Call a suicide hotline to speak with a trained counselor. The following suicide hotlines are available in the [place]:  0-382-668-TALK (1-757.210.7191).  8-932-AAEUVCZ (1-993.840.6726).  1-787.542.2458. This is a hotline for Irish speakers.  1-639.615.2429. This is a hotline for TTY users.  5-892-9-U-MANUEL (1-483.342.5831). This is a hotline for lesbian, flynn, bisexual, transgender, or questioning youth.  For a list of hotlines in [place], visit www.suicide.org/hotlines/international/mzitrl-qotrbdn-euqvyjey.html  Contact a crisis center or a local suicide prevention center. To find a crisis center or suicide prevention center:  Call your local hospital, clinic, community service organization, mental health center, social service provider, or health department. Ask for help with connecting to a crisis center.  For a list of crisis centers in the [place], visit: suicidepreventionlifeline.org  For a list of crisis centers in [place], visit: suicideprevention.ca  How to help yourself feel better  Image   Promise yourself that you will not do anything extreme when you have suicidal feelings. Remember, there is hope. Many people have gotten through suicidal thoughts and feelings, and you can too. If you have had these feelings before, remind yourself that you can get through them again.  Let family, friends, teachers, or counselors know how you are feeling. Try not to separate yourself from those who care about you and want to help you. Talk with someone every day, even if you do not feel sociable. Face-to-face conversation is best to help them understand your feelings.  Contact a mental health care provider and work with this person regularly.  Make a safety plan that you can follow during a crisis. Include phone numbers of suicide prevention hotlines, mental health professionals, and trusted friends and family members you can call during an emergency. Save these numbers on your phone.  If you are thinking of taking a lot of medicine, give your medicine to someone who can give it to you as prescribed. If you are on antidepressants and are concerned you will overdose, tell your health care provider so that he or she can give you safer medicines.  Try to stick to your routines. Follow a schedule every day. Make self-care a priority.  Make a list of realistic goals, and cross them off when you achieve them. Accomplishments can give you a sense of worth.  Wait until you are feeling better before doing things that you find difficult or unpleasant.  Do things that you have always enjoyed to take your mind off your feelings. Try reading a book, or listening to or playing music. Spending time outside, in nature, may help you feel better.  Follow these instructions at home:  Image   Visit your primary health care provider every year for a checkup.  Work with a mental health care provider as needed.  Eat a well-balanced diet, and eat regular meals.  Get plenty of rest.  Exercise if you are able. Just 30 minutes of exercise each day can help you feel better.  Take over-the-counter and prescription medicines only as told by your health care provider. Ask your mental health care provider about the possible side effects of any medicines you are taking.  Do not use alcohol or drugs, and remove these substances from your home.  Remove weapons, poisons, knives, and other deadly items from your home.  General recommendations  Keep your living space well lit.  When you are feeling well, write yourself a letter with tips and support that you can read when you are not feeling well.  Remember that life's difficulties can be sorted out with help. Conditions can be treated, and you can learn behaviors and ways of thinking that will help you.  Where to find more information  National Suicide Prevention Lifeline: www.suicidepreventionlifeline.org  Hopeline: www.hopeline.com  American Foundation for Suicide Prevention: www.afsp.org  The [place] Project (for lesbian, flynn, bisexual, transgender, or questioning youth): www.theCRVvorproject.org  Contact a health care provider if:  You feel as though you are a burden to others.  You feel agitated, angry, vengeful, or have extreme mood swings.  You have withdrawn from family and friends.  Get help right away if:  You are talking about suicide or wishing to die.  You start making plans for how to commit suicide.  You feel that you have no reason to live.  You start making plans for putting your affairs in order, saying goodbye, or giving your possessions away.  You feel guilt, shame, or unbearable pain, and it seems like there is no way out.  You are frequently using drugs or alcohol.  You are engaging in risky behaviors that could lead to death.  If you have any of these symptoms, get help right away. Call emergency services, go to your nearest emergency department or crisis center, or call a suicide crisis helpline.     Summary  Suicide is when you take your own life.  Promise yourself that you will not do anything extreme when you have suicidal feelings.  Let family, friends, teachers, or counselors know how you are feeling.  Get help right away if you feel as though life is getting too tough to handle and you are thinking about suicide.  This information is not intended to replace advice given to you by your health care provider. Make sure you discuss any questions you have with your health care provider.    Document Released: 06/23/2004 Document Revised: 07/31/2018 Document Reviewed: 07/31/2018  [employer] Patient Education © 2019 [employer]    Drug Abuse    Chemical dependency is an addiction to drugs or alcohol. It is characterized by the repeated behavior of seeking out and using drugs and alcohol despite harmful consequences to the health and safety of ones self and others.     SEEK IMMEDIATE MEDICAL CARE IF YOU HAVE THE FOLLOWING SYMPTOMS: chest pain, shortness of breath, change in mental status, thoughts about hurting killing yourself, thoughts about hurting or killing somebody else, hallucinations, or worsening depression.

## 2020-10-17 NOTE — ED PROVIDER NOTE - PATIENT PORTAL LINK FT
You can access the FollowMyHealth Patient Portal offered by [facility] by registering at the following website: [email or web address]. By joining [employer]’s FollowMyHealth portal, you will also be able to view your health information using other applications (apps) compatible with our system.

## 2020-10-17 NOTE — ED BEHAVIORAL HEALTH ASSESSMENT NOTE - OTHER
Life long Friend/ Neighbor friend, Ambrosio Mireles as above not assessed antalgic conditional on IPP admission patient unable to give specifics on content of hallucinations, quality, nor describe the voices and states "they call me on the phone"; does not appear internally preoccupied

## 2020-10-17 NOTE — ED ADULT NURSE NOTE - OBJECTIVE STATEMENT
Presents to ED as "i'm so tired, I suffer from depression & I'm tired of fighting in this life" - patient   pt with suicidal thoughts, denies suicidal plan, denies homicidal ideations. Denies wanting to harm others.

## 2020-10-17 NOTE — ED ADULT NURSE NOTE - NSIMPLEMENTINTERV_GEN_ALL_ED
Implemented All Fall Risk Interventions:  [place] to call system. Call bell, personal items and telephone within reach. Instruct patient to call for assistance. Room bathroom lighting operational. Non-slip footwear when patient is off stretcher. Physically safe environment: no spills, clutter or unnecessary equipment. Stretcher in lowest position, wheels locked, appropriate side rails in place. Provide visual cue, wrist band, yellow gown, etc. Monitor gait and stability. Monitor for mental status changes and reorient to person, place, and time. Review medications for side effects contributing to fall risk. Reinforce activity limits and safety measures with patient and family.

## 2020-10-17 NOTE — ED PROVIDER NOTE - ATTENDING CONTRIBUTION TO CARE
Patient presents to ED with multiple complaints, denies any recent trauma. Patient is c/o feeling suicidal, also has been having abdominal cramping and diarrhea, denies blood in the stool, diarrhea described as loose BMs, few times a day. Denies f/c/cp/sob/syncope. Denies OD on medications.   Vitals noted  lungs: CTA, no crackles  abd: +Bs, NT, ND, soft  CNS: awake, alert, o x 3, speaking in full sentences, appears comfortable and ambulatory in ED.   A/P: Suicidal ideation  Abdominal cramping/diarrhea  labs, EKG, Psychiatry consult  reevaluation

## 2020-10-17 NOTE — ED BEHAVIORAL HEALTH ASSESSMENT NOTE - ADDITIONAL DETAILS ALL
patient endorses suicidality w/ plan, however, conditional on IPP admission; also notes overdose night prior to presentation however patient did not complete attempt patient endorses suicidality w/ plan to take pills, however, this appears to be conditional on IPP admission; also notes overdose night prior to presentation however patient did not complete attempt and is vague about intent citing reason was to "sleep I don't know" and states feeling embarrassed by attempt

## 2020-10-17 NOTE — ED PROVIDER NOTE - NSFOLLOWUPCLINICS_GEN_ALL_ED_FT
[facility]  Detox Mgmt  [address] [place] [address]  Phone: [phone]  Fax:   Follow Up Time: 1-3 Days    [facility]  Medicine  [address] [place] [address]   Phone: [phone]  Fax:   Follow Up Time: 1-3 Days    [facility]  OP Mental Health  [address] [place] [address]  Phone: [phone]  Fax:   Follow Up Time: 1-3 Days    [facility] ([facility])  Rehabilitation  [facility] [address] [place] [address]  Phone: [phone]  Fax:   Follow Up Time: 1-3 Days
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
## 2020-10-17 NOTE — ED BEHAVIORAL HEALTH ASSESSMENT NOTE - ACTIVATING EVENTS/STRESSORS
Perceived burden on family or others/Non-compliant or not receiving treatment/Substance intoxication or withdrawal

## 2020-10-17 NOTE — ED PROVIDER NOTE - NS ED ROS FT
Constitutional:  (-) fever, (-) chills, (+) lethargy  Eyes:  (-) eye pain (-) visual changes  ENMT: (-) nasal discharge, (-) sore throat. (-) neck pain or stiffness  Cardiac: (-) chest pain (-) palpitations  Respiratory:  (-) cough (-) respiratory distress.   GI:  (-) nausea (-) vomiting (+) diarrhea (+) abdominal pain.  :  (-) dysuria (-) frequency (-) burning.  MS:  (+) back pain, chronic (-) joint pain.  Neuro:  (-) headache (-) numbness (-) tingling (-) focal weakness  Skin:  (-) rash  Except as documented in the HPI,  all other systems are negative

## 2020-10-17 NOTE — ED PROVIDER NOTE - CLINICAL SUMMARY MEDICAL DECISION MAKING FREE TEXT BOX
patient remained stable in ED, improved well, results of the diagnostic studies reviewed and discussed with patient, Patient remained awake, alert, ambulatory and comfortable, tolerated PO. Patient was evaluated and cleared by psychiatrist to f/u as outpatient. Discussed with patient in detail about the need for close outpatient follow up and the need to return to ED for any persistent, or worsening symptoms, for any new symptoms/concerns. patient verbalized understanding and agreed. patient is given detail aftercare instructions and is instructed well to f/u as outpatient for further care.
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
## 2020-10-17 NOTE — ED BEHAVIORAL HEALTH ASSESSMENT NOTE - SAFETY PLAN ADDT'L DETAILS
Safety plan discussed with.../Provision of National Suicide Prevention Lifeline 5-345-537-TALK (3253)

## 2020-10-17 NOTE — ED BEHAVIORAL HEALTH ASSESSMENT NOTE - DESCRIPTION (FIRST USE, LAST USE, QUANTITY, FREQUENCY, DURATION)
patient does not specify amount patient denies , however, as per chart review patient has hx of alcohol use d/o patient notes remote hx of crack cocaine use d/o, reports last use 20 years ago patient denies hx of opioid use , however, as per chart review has history of same and currently Rxed Suboxone 8mg BID as per Istop, which she reports daily compliance notes current daily use of "1-2 stucks per day" x 2 years patient also reports buying Ambien from the street

## 2020-10-17 NOTE — ED BEHAVIORAL HEALTH ASSESSMENT NOTE - RISK ASSESSMENT
Patient has chronic elevated risk for self harm given past history of suicide attempt, substance use, marital status and age.  Though patient endorses current suicidal ideation, this appears to be conditional on secondary gain of admission to inpatient unit.  Her acute risk is mitigated by ability to state reasons for living (her kids and her friend), strong social supports, willingness to seek care at multiple emergency rooms, residential stability and ability to state that she would return to ED or call 911 if symptoms worsen.  Given these mitigating factors, patient does not appear to be at imminent risk for harm to self or others at this time and does not meet criteria for involuntary inpatient psychiatric hospitalization. Low Acute Suicide Risk

## 2020-10-18 VITALS
OXYGEN SATURATION: 99 % | HEART RATE: 69 BPM | DIASTOLIC BLOOD PRESSURE: 72 MMHG | SYSTOLIC BLOOD PRESSURE: 127 MMHG | TEMPERATURE: 98 F | RESPIRATION RATE: 16 BRPM

## 2020-10-18 DIAGNOSIS — F48.9 NONPSYCHOTIC MENTAL DISORDER, UNSPECIFIED: ICD-10-CM

## 2020-10-18 DIAGNOSIS — F13.90 SEDATIVE, HYPNOTIC, OR ANXIOLYTIC USE, UNSPECIFIED, UNCOMPLICATED: ICD-10-CM

## 2020-10-18 LAB
APPEARANCE UR: CLEAR — SIGNIFICANT CHANGE UP
BACTERIA # UR AUTO: NEGATIVE — SIGNIFICANT CHANGE UP
BILIRUB UR-MCNC: NEGATIVE — SIGNIFICANT CHANGE UP
CK SERPL-CCNC: 760 U/L — HIGH (ref 0–225)
COLOR SPEC: YELLOW — SIGNIFICANT CHANGE UP
DIFF PNL FLD: NEGATIVE — SIGNIFICANT CHANGE UP
EPI CELLS # UR: 3 /HPF — SIGNIFICANT CHANGE UP (ref 0–5)
GLUCOSE UR QL: NEGATIVE — SIGNIFICANT CHANGE UP
HYALINE CASTS # UR AUTO: 3 /LPF — SIGNIFICANT CHANGE UP (ref 0–7)
KETONES UR-MCNC: NEGATIVE — SIGNIFICANT CHANGE UP
LEUKOCYTE ESTERASE UR-ACNC: ABNORMAL
MAGNESIUM SERPL-MCNC: 1.7 MG/DL — LOW (ref 1.8–2.4)
NITRITE UR-MCNC: NEGATIVE — SIGNIFICANT CHANGE UP
PH UR: 6.5 — SIGNIFICANT CHANGE UP (ref 5–8)
PROT UR-MCNC: SIGNIFICANT CHANGE UP
RBC CASTS # UR COMP ASSIST: 3 /HPF — SIGNIFICANT CHANGE UP (ref 0–4)
SP GR SPEC: 1.03 — SIGNIFICANT CHANGE UP (ref 1.01–1.03)
TROPONIN T SERPL-MCNC: <0.01 NG/ML — SIGNIFICANT CHANGE UP
UROBILINOGEN FLD QL: SIGNIFICANT CHANGE UP
WBC UR QL: 15 /HPF — HIGH (ref 0–5)

## 2020-10-18 RX ORDER — SODIUM CHLORIDE 9 MG/ML
1000 INJECTION INTRAMUSCULAR; INTRAVENOUS; SUBCUTANEOUS ONCE
Refills: 0 | Status: COMPLETED | OUTPATIENT
Start: 2020-10-18 | End: 2020-10-18

## 2020-10-18 RX ORDER — POTASSIUM CHLORIDE 20 MEQ
40 PACKET (EA) ORAL ONCE
Refills: 0 | Status: COMPLETED | OUTPATIENT
Start: 2020-10-18 | End: 2020-10-18

## 2020-10-18 RX ORDER — MAGNESIUM SULFATE 500 MG/ML
2 VIAL (ML) INJECTION ONCE
Refills: 0 | Status: COMPLETED | OUTPATIENT
Start: 2020-10-18 | End: 2020-10-18

## 2020-10-18 RX ADMIN — Medication 50 GRAM(S): at 02:07

## 2020-10-18 RX ADMIN — Medication 40 MILLIEQUIVALENT(S): at 02:06

## 2020-10-18 RX ADMIN — SODIUM CHLORIDE 1000 MILLILITER(S): 9 INJECTION INTRAMUSCULAR; INTRAVENOUS; SUBCUTANEOUS at 01:01

## 2020-10-18 RX ADMIN — SODIUM CHLORIDE 1000 MILLILITER(S): 9 INJECTION INTRAMUSCULAR; INTRAVENOUS; SUBCUTANEOUS at 02:07

## 2020-10-19 LAB
AMPHET UR-MCNC: NEGATIVE — SIGNIFICANT CHANGE UP
BARBITURATES UR SCN-MCNC: NEGATIVE — SIGNIFICANT CHANGE UP
BENZODIAZ UR-MCNC: POSITIVE
COCAINE METAB.OTHER UR-MCNC: NEGATIVE — SIGNIFICANT CHANGE UP
DRUG SCREEN 1, URINE RESULT: SIGNIFICANT CHANGE UP
METHADONE UR-MCNC: NEGATIVE — SIGNIFICANT CHANGE UP
OPIATES UR-MCNC: NEGATIVE — SIGNIFICANT CHANGE UP
PCP UR-MCNC: NEGATIVE — SIGNIFICANT CHANGE UP
PROPOXYPHENE QUALITATIVE URINE RESULT: NEGATIVE — SIGNIFICANT CHANGE UP
THC UR QL: NEGATIVE — SIGNIFICANT CHANGE UP

## 2021-07-09 ENCOUNTER — EMERGENCY (EMERGENCY)
Facility: HOSPITAL | Age: 56
LOS: 0 days | Discharge: HOME | End: 2021-07-09
Attending: EMERGENCY MEDICINE | Admitting: EMERGENCY MEDICINE
Payer: MEDICAID

## 2021-07-09 VITALS
HEIGHT: 64 IN | RESPIRATION RATE: 20 BRPM | OXYGEN SATURATION: 99 % | DIASTOLIC BLOOD PRESSURE: 66 MMHG | SYSTOLIC BLOOD PRESSURE: 146 MMHG | HEART RATE: 72 BPM | TEMPERATURE: 98 F | WEIGHT: 225.09 LBS

## 2021-07-09 DIAGNOSIS — Z98.891 HISTORY OF UTERINE SCAR FROM PREVIOUS SURGERY: Chronic | ICD-10-CM

## 2021-07-09 DIAGNOSIS — Z86.59 PERSONAL HISTORY OF OTHER MENTAL AND BEHAVIORAL DISORDERS: ICD-10-CM

## 2021-07-09 DIAGNOSIS — F17.200 NICOTINE DEPENDENCE, UNSPECIFIED, UNCOMPLICATED: ICD-10-CM

## 2021-07-09 DIAGNOSIS — I10 ESSENTIAL (PRIMARY) HYPERTENSION: ICD-10-CM

## 2021-07-09 DIAGNOSIS — Z90.49 ACQUIRED ABSENCE OF OTHER SPECIFIED PARTS OF DIGESTIVE TRACT: Chronic | ICD-10-CM

## 2021-07-09 DIAGNOSIS — K08.89 OTHER SPECIFIED DISORDERS OF TEETH AND SUPPORTING STRUCTURES: ICD-10-CM

## 2021-07-09 DIAGNOSIS — Z79.899 OTHER LONG TERM (CURRENT) DRUG THERAPY: ICD-10-CM

## 2021-07-09 DIAGNOSIS — Z88.8 ALLERGY STATUS TO OTHER DRUGS, MEDICAMENTS AND BIOLOGICAL SUBSTANCES: ICD-10-CM

## 2021-07-09 PROCEDURE — 99283 EMERGENCY DEPT VISIT LOW MDM: CPT

## 2021-07-09 RX ORDER — IBUPROFEN 200 MG
1 TABLET ORAL
Qty: 21 | Refills: 0
Start: 2021-07-09 | End: 2021-07-15

## 2021-07-09 RX ORDER — AMOXICILLIN 250 MG/5ML
1 SUSPENSION, RECONSTITUTED, ORAL (ML) ORAL
Qty: 20 | Refills: 0
Start: 2021-07-09 | End: 2021-07-18

## 2021-07-09 RX ORDER — IBUPROFEN 200 MG
600 TABLET ORAL ONCE
Refills: 0 | Status: COMPLETED | OUTPATIENT
Start: 2021-07-09 | End: 2021-07-09

## 2021-07-09 RX ADMIN — Medication 1 TABLET(S): at 17:40

## 2021-07-09 RX ADMIN — Medication 600 MILLIGRAM(S): at 17:40

## 2021-07-09 NOTE — ED PROVIDER NOTE - OBJECTIVE STATEMENT
pt presents to ED c/o #8 tooth pain for over 1 week not relieved by tylenol. pain is sharp, nonradiating, moderate. denies exacerbating or relieving factors. Denies fever/chill/HA/dizziness/chest pain/palpitation/sob/abd pain/n/v/d/ black stool/bloody stool/urinary sxs

## 2021-07-09 NOTE — ED PROVIDER NOTE - NSFOLLOWUPCLINICS_GEN_ALL_ED_FT
[facility]  Dental  [address] [place] [id number]  Phone: [phone]  Fax:

## 2021-07-09 NOTE — ED PROVIDER NOTE - PHYSICAL EXAMINATION
CONSTITUTIONAL: Well-appearing; well-nourished; in no apparent distress.   ENT: poor dentition, dental caries, otherwise normal nose; no rhinorrhea; normal pharynx with no tonsillar hypertrophy.   SKIN: Normal for age and race; warm; dry; good turgor; no apparent lesions or exudate.   NEURO/PSYCH: A & O x 4; grossly unremarkable. mood and manner are appropriate.

## 2021-07-09 NOTE — ED ADULT TRIAGE NOTE - CHIEF COMPLAINT QUOTE
Pt presented with c/o dental pain to upper teeth. Pt states pain has been going on for a week. Denies fever/difficulty swallowing/breathing.

## 2021-07-09 NOTE — ED PROVIDER NOTE - PATIENT PORTAL LINK FT
You can access the FollowMyHealth Patient Portal offered by [facility] by registering at the following website: [email or web address]. By joining [employer]’s FollowMyHealth portal, you will also be able to view your health information using other applications (apps) compatible with our system.

## 2021-07-09 NOTE — ED PROVIDER NOTE - ATTENDING CONTRIBUTION TO CARE
55 yo f   presents with toothache.  Tender to percussion tooth# 8   .  No abscess.  Requesting dental block.  Dental consult

## 2021-07-09 NOTE — ED ADULT NURSE NOTE - OBJECTIVE STATEMENT
Patient present to ED with complains of upper dental pain for a couple of days. Denies fevers, chills, headache and nausea.

## 2021-07-09 NOTE — CONSULT NOTE ADULT - SUBJECTIVE AND OBJECTIVE BOX
Patient is a 56y old  Female who presents to ED with a chief complaint of dental pain.     HPI: pt presents to ED c/o #8 tooth pain for over a week, pain not relieved by Tylenol, pain is sharp, non-radiating, moderate, denies exacerbating or relieving factors.       PAST MEDICAL & SURGICAL HISTORY:  Anxiety  Depression  Panic attacks  Hypertension  History of cholecystectomy  H/O:     MEDICATIONS  (PRN): cefpodoxime, Quetiapine, buprenorphine-naloxone, escitaoprem, acetominophen, senna oral tablet, gabapentin, simastatin, losartan, hydroCHLOROthiazide, metoprolol tartrate, omeprazole.     Allergies  Benadryl (Unknown)    FAMILY HISTORY:  No pertinent family history in first degree relatives    *SOCIAL HISTORY: ( +  ) Tobacco;    Vital Signs Last 24 Hrs  T(C): 36.8 (2021 16:35), Max: 36.8 (2021 16:35)  T(F): 98.2 (2021 16:35), Max: 98.2 (2021 16:35)  HR: 72 (2021 16:35) (72 - 72)  BP: 146/66 (2021 16:35) (146/66 - 146/66)  BP(mean): --  RR: 20 (2021 16:35) (20 - 20)  SpO2: 99% (2021 16:35) (99% - 99%)      EOE:  TMJ (  -  ) clicks                     ( -  ) pops                     ( -  ) crepitus             Mandible <<FROM>>             Facial bones and MOM <<grossly intact>>             (-   ) trismus             (  - ) lymphadenopathy             (  - ) swelling             ( -  ) asymmetry             ( -  ) palpation             (  - ) dyspnea             (-   ) dysphagia             (  -) loss of consciousness    IOE:  <<permanent>> dentition          <<multiple carious teeth>>             <<multiple missing teeth>>                    hard/soft palate:  ( -  ) palatal torus, <<No pathology noted>>            tongue/FOM <<No pathology noted>>   Tooth #8:            (  +  ) percussion           (  +  ) palpation           ( -  ) swelling            ( -  ) abscess           ( -  ) sinus tract    *DENTAL RADIOGRAPHS: not available     *ASSESSMENT: 57 y/o female  pt presents to ED c/o #8 tooth pain for over a week, pain not relieved by Tylenol, pain is sharp, non-radiating, moderate, denies exacerbating or relieving factors. Tooth #8 is fractured, + percussion, + palpation. Tooth is hopeless and requires extraction.     *PLAN:  Anesthetize with 1 Caruple Marcaine .5% with epinephrine 1:200k via maxillary infiltration around tooth # 8. Antibiotics and analgesics for infection and pain control. Patient to return to primary dentist for treatment.    PROCEDURE:   Verbal and written consent given.  Anesthesia: << 1 Caruple Marcaine 0.5% with epinephrine 1:200k via maxillary infiltration around tooth # 8.   >>   Treatment: << maxillary infiltration around tooth #8 with 1 Caruple Marcaine 0.5% with epinephrine 1:200k  >>     RECOMMENDATIONS:  1) <<Antibiotics and pain medication as per the primary team    >>  2) Dental F/U with outpatient dentist for comprehensive dental care.   3) If any difficulty swallowing/breathing, fever occur, return to ER.     Case discussed with Dr. Amena Purcell [facility], pager #[phone]

## 2021-10-06 NOTE — ED ADULT TRIAGE NOTE - SOURCE OF INFORMATION
Patient Erivedge Counseling- I discussed with the patient the risks of Erivedge including but not limited to nausea, vomiting, diarrhea, constipation, weight loss, changes in the sense of taste, decreased appetite, muscle spasms, and hair loss.  The patient verbalized understanding of the proper use and possible adverse effects of Erivedge.  All of the patient's questions and concerns were addressed.

## 2021-10-13 ENCOUNTER — OUTPATIENT (OUTPATIENT)
Dept: OUTPATIENT SERVICES | Facility: HOSPITAL | Age: 56
LOS: 1 days | Discharge: HOME | End: 2021-10-13

## 2021-10-13 DIAGNOSIS — Z90.49 ACQUIRED ABSENCE OF OTHER SPECIFIED PARTS OF DIGESTIVE TRACT: Chronic | ICD-10-CM

## 2021-10-13 DIAGNOSIS — Z98.891 HISTORY OF UTERINE SCAR FROM PREVIOUS SURGERY: Chronic | ICD-10-CM

## 2021-10-18 NOTE — ED ADULT TRIAGE NOTE - AS O2 DELIVERY
ED HPI GENERAL MEDICAL PROBLEM





- General


Chief Complaint: Gastrointestinal Problem


Stated Complaint: VOMITTING/CRYING


Time Seen by Provider: 10/18/21 06:00


Source of Information: Reports: Family





- History of Present Illness


INITIAL COMMENTS - FREE TEXT/NARRATIVE: 





Patient is an 11-month-old female with no significant past medical history 

presenting with mother for chief complaint of vomiting.  According to mother, 

the vomiting has been ongoing for the past 2 days.  Patient has had 2-3 episodes

of vomiting per day which has not been bloody at all.  Child does seem to be a 

little bit more irritable and fussy.  Mother states the child is teething and 

has been grabbing at her mouth a lot.  Otherwise, child has had normal appetite.

 Slight decrease in wet diapers but is still having 1-2 bowel movements per day 

as well.  No fevers at home, no cough, no rhinorrhea.  Mother's been utilizing 

some Orajel as well as Tylenol/Motrin which does not seem to help the patient's 

symptoms.





- Related Data


                                    Allergies











Allergy/AdvReac Type Severity Reaction Status Date / Time


 


No Known Allergies Allergy   Verified 10/18/21 05:45











Home Meds: 


                                    Home Meds





Ondansetron [Zofran ODT] 2 mg PO Q6H PRN #6 tab.dis 10/18/21 [Rx]











Social & Family History





- Tobacco Use


Tobacco Use Status *Q: Never Tobacco User





- Caffeine Use


Caffeine Use: Reports: None





- Recreational Drug Use


Recreational Drug Use: No





ED ROS GENERAL





- Review of Systems


Review Of Systems: Comprehensive ROS is negative, except as noted in HPI.





ED EXAM, GI/ABD





- Physical Exam


Exam: See Below


Text/Narrative:: 





Constitutional: Sleeping comfortably in mother's arms, NAD, active, vigorous


EYES: PERRL. Sclera non-icteric. Conjunctiva non-injected. No discharge.


HENT: NCAT. Fontanelles flat. MMM. TMs clear bilaterally No cervical LAD.  Neck 

supple without meningismus.


CV: Warm and well-perfused extremities.  Less than 2-second capillary refill


Resp: No increased WOB. CTAB.


GI: Normoactive bowel sounds. Soft, NT/ND, no masses or organomegaly 

appreciated.


MSK: No gross deformities appreciated.


Neuro: Alert, age appropriate. Normal muscle tone. Moving all extremities.


Skin: No rashes.








Course





- Vital Signs


Last Recorded V/S: 


                                Last Vital Signs











Temp  36.2 C   10/18/21 05:41


 


Pulse  117   10/18/21 05:41


 


Resp  25   10/18/21 05:41


 


BP      


 


Pulse Ox  100   10/18/21 05:41














- Orders/Labs/Meds


Meds: 


Medications














Discontinued Medications














Generic Name Dose Route Start Last Admin





  Trade Name Freq  PRN Reason Stop Dose Admin


 


Ondansetron HCl  2 mg  10/18/21 06:08  10/18/21 06:19





  Ondansetron 4 Mg Tab.Dis  PO  10/18/21 06:09  2 mg





  ONETIME ONE   Administration














Departure





- Departure


Time of Disposition: 06:54


Disposition: Home, Self-Care 01


Clinical Impression: 


 Vomiting








- Discharge Information


Prescriptions: 


Ondansetron [Zofran ODT] 2 mg PO Q6H PRN #6 tab.dis


 PRN Reason: Vomiting


Instructions:  Viral Gastroenteritis, Infant


Referrals: 


Nick Antoine MD [Primary Care Provider] - 


Forms:  ED Department Discharge





Sepsis Event Note (ED)





- Evaluation


Sepsis Screening Result: No Definite Risk





- Focused Exam


Vital Signs: 


                                   Vital Signs











  Temp Pulse Resp Pulse Ox


 


 10/18/21 05:41  36.2 C  117  25  100














- Assessment/Plan


Assessment:: 





Patient is 11-month-old female presented to emergency room with a complaint of 

vomiting.  Child's exam is entirely benign.  No evidence of dehydration.  

Differential diagnosis for vomiting in pediatric patient includes appendicitis, 

bowel obstruction, UTI, otitis media, streptococcal pharyngitis, pneumonia.  Ba

sed on evaluation, likely etiology of vomiting is related to teething.  Possible

mild viral infection.  Patient received Zofran in the emergency room and was 

tolerating p.o.  Her abdominal exam is benign.  Child awake and interacting 

appropriately.  Recommend discharge with outpatient follow-up.  Return 

precautions discussed as usual.  Mother agrees with plan of care.
room air

## 2021-11-08 NOTE — ED PROVIDER NOTE - CARE PLAN
English
Principal Discharge DX:	Suicidal thoughts  Secondary Diagnosis:	UTI (urinary tract infection)

## 2022-07-25 ENCOUNTER — EMERGENCY (EMERGENCY)
Facility: HOSPITAL | Age: 57
LOS: 0 days | Discharge: HOME | End: 2022-07-25
Attending: EMERGENCY MEDICINE | Admitting: EMERGENCY MEDICINE

## 2022-07-25 VITALS
HEART RATE: 102 BPM | TEMPERATURE: 99 F | OXYGEN SATURATION: 100 % | DIASTOLIC BLOOD PRESSURE: 94 MMHG | SYSTOLIC BLOOD PRESSURE: 185 MMHG | RESPIRATION RATE: 18 BRPM | HEIGHT: 64 IN

## 2022-07-25 VITALS — HEART RATE: 92 BPM

## 2022-07-25 DIAGNOSIS — R50.9 FEVER, UNSPECIFIED: ICD-10-CM

## 2022-07-25 DIAGNOSIS — J02.9 ACUTE PHARYNGITIS, UNSPECIFIED: ICD-10-CM

## 2022-07-25 DIAGNOSIS — Z90.49 ACQUIRED ABSENCE OF OTHER SPECIFIED PARTS OF DIGESTIVE TRACT: ICD-10-CM

## 2022-07-25 DIAGNOSIS — Z98.891 HISTORY OF UTERINE SCAR FROM PREVIOUS SURGERY: Chronic | ICD-10-CM

## 2022-07-25 DIAGNOSIS — M79.10 MYALGIA, UNSPECIFIED SITE: ICD-10-CM

## 2022-07-25 DIAGNOSIS — Z88.8 ALLERGY STATUS TO OTHER DRUGS, MEDICAMENTS AND BIOLOGICAL SUBSTANCES STATUS: ICD-10-CM

## 2022-07-25 DIAGNOSIS — Z28.310 UNVACCINATED FOR COVID-19: ICD-10-CM

## 2022-07-25 DIAGNOSIS — I10 ESSENTIAL (PRIMARY) HYPERTENSION: ICD-10-CM

## 2022-07-25 DIAGNOSIS — Z20.822 CONTACT WITH AND (SUSPECTED) EXPOSURE TO COVID-19: ICD-10-CM

## 2022-07-25 DIAGNOSIS — Z90.49 ACQUIRED ABSENCE OF OTHER SPECIFIED PARTS OF DIGESTIVE TRACT: Chronic | ICD-10-CM

## 2022-07-25 LAB — SARS-COV-2 RNA SPEC QL NAA+PROBE: SIGNIFICANT CHANGE UP

## 2022-07-25 PROCEDURE — 99284 EMERGENCY DEPT VISIT MOD MDM: CPT

## 2022-07-25 RX ORDER — IBUPROFEN 200 MG
600 TABLET ORAL ONCE
Refills: 0 | Status: DISCONTINUED | OUTPATIENT
Start: 2022-07-25 | End: 2022-07-25

## 2022-07-25 NOTE — ED PROVIDER NOTE - PATIENT PORTAL LINK FT
You can access the FollowMyHealth Patient Portal offered by [facility] by registering at the following website: [email or web address]. By joining [employer]’s FollowMyHealth portal, you will also be able to view your health information using other applications (apps) compatible with our system.

## 2022-07-25 NOTE — ED ADULT NURSE NOTE - DRUG PRE-SCREENING (DAST -1)
----- Message from Ronna Elam MD sent at 1/5/6181  7:23 AM EDT -----  Please call   bw shows low thyroid level and I will need to increase her levothyroxine from 100 to 125mcg daily  I will send to pharm and recheck bw prior to leaving for [place] in fall  Statement Selected

## 2022-07-25 NOTE — ED PROVIDER NOTE - NSFOLLOWUPINSTRUCTIONS_ED_ALL_ED_FT
Follow up with your doctor in 1-2 days.  If you do not have a doctor, CALL [phone] to find a physician to follow up with.      Fever, Adult                    A fever is an increase in the body's temperature. It is usually defined as a temperature of 100.4°F (38°C) or higher. Brief mild or moderate fevers generally have no long-term effects, and they often do not need treatment. Moderate or high fevers may make you feel uncomfortable and can sometimes be a sign of a serious illness or disease. The sweating that may occur with repeated or prolonged fever may also cause a loss of fluid in the body (dehydration).    Fever is confirmed by taking a temperature with a thermometer. A measured temperature can vary with:  •Age.      •Time of day.    •Where in the body you take the temperature. Readings may vary if you place the thermometer:  •In the mouth (oral).      •In the rectum (rectal).      •In the ear (tympanic).      •Under the arm (axillary).      •On the forehead (temporal).          Follow these instructions at home:    Medicines     •Take over-the counter and prescription medicines only as told by your health care provider. Follow the dosing instructions carefully.      •If you were prescribed an antibiotic medicine, take it as told by your health care provider. Do not stop taking the antibiotic even if you start to feel better.      General instructions     •Watch your condition for any changes. Let your health care provider know about them.      •Rest as needed.      •Drink enough fluid to keep your urine pale yellow. This helps to prevent dehydration.      •Sponge yourself or bathe with room-temperature water to help reduce your body temperature as needed. Do not use ice water.      • Do not use too many blankets or wear clothes that are too heavy.      •If your fever may be caused by an infection that spreads from person to person (is contagious), such as a cold or the flu, you should stay home from work and public gatherings for at least 24 hours after your fever is gone. Your fever should be gone without the need to use medicines.        Contact a health care provider if:    •You vomit.      •You cannot eat or drink without vomiting.      •You have diarrhea.      •You have pain when you urinate.      •Your symptoms do not improve with treatment.      •You develop new symptoms.      •You develop excessive weakness.        Get help right away if:    •You have shortness of breath or have trouble breathing.      •You are dizzy or you faint.      •You are disoriented or confused.    •You develop signs of dehydration, such as:  •Dark urine, very little urine, or no urine.      •Cracked lips.      •Dry mouth.      •Sunken eyes.      •Sleepiness.      •Weakness.        •You develop severe pain in your abdomen.      •You have persistent vomiting or diarrhea.      •You develop a skin rash.      •Your symptoms suddenly get worse.        Summary    •A fever is an increase in the body's temperature. It is usually defined as a temperature of 100.4°F (38°C) or higher. Moderate or high fevers can sometimes be a sign of a serious illness or disease. The sweating that may occur with repeated or prolonged fever may also cause dehydration.      •Pay attention to any changes in your symptoms and contact your health care provider if your symptoms do not improve with treatment.      •Take over-the counter and prescription medicines only as told by your health care provider. Follow the dosing instructions carefully.      •If your fever is from an infection that may be contagious, such as cold or flu, you should stay home from work and public gatherings for at least 24 hours after your fever is gone. Your fever should be gone without the need to use medicines.      •Get help right away if you develop signs of dehydration, such as dark urine, cracked lips, dry mouth, sunken eyes, sleepiness, or weakness.

## 2022-07-25 NOTE — ED PROVIDER NOTE - OBJECTIVE STATEMENT
57-year-old female history of HTN, cholecystectomy, non-smoker, not vaccinated for COVID, presents with fever (tactile) times several days, symptoms are persistent, denies modifying factors other than antipyretics, associated sore throat and myalgias, denies headache, ear pain, abdominal pain, n/v/d, respiratory sx, change in bowel habits or urinary sx, rash or other associated complaints at present.    Old chart reviewed.  I have reviewed and agree with the initial nursing note, except as documented in my note.

## 2022-07-25 NOTE — ED PROVIDER NOTE - PROGRESS NOTE DETAILS
BH: VSS, sat>90% RA with exertion, well appearing, clinical picture possibly c/w coronavirus, unlikely underlying cardiac or vascular complications, results d/w pt, states feels well enough to go home, no NICOLE, ambulating in ED w/o difficulty, advised they do not require hospitalization at this time although if symptoms change or worsen, may need to be hospitalized at a later date, also advised to self isolate.    The patient was given detailed return precautions and advised to return to the emergency department if any new symptoms developed, symptoms worsened or for any concerns. The patient was offered the opportunity to ask questions and verbalized that they understand the diagnosis and discharge instructions.

## 2022-07-25 NOTE — ED PROVIDER NOTE - PHYSICAL EXAMINATION
VSS, awake, alert, non-toxic appearing, oropharynx clear, no tracheal deviation, non-labored breathing without accessory muscle use apparent or pursed lip breathing, no retractions, speaks full sentences, chest CTAB, no w/r/r, +S1/S2, RRR, no m/r/g, abdomen soft, NT, ND, +BS, AO x 3, clear speech and steady gait.

## 2022-09-01 NOTE — PATIENT PROFILE BEHAVIORAL HEALTH - NSBHPSTIDEA_PSY_A_CORE
Implemented All Universal Safety Interventions:  [place] to call system. Call bell, personal items and telephone within reach. Instruct patient to call for assistance. Room bathroom lighting operational. Non-slip footwear when patient is off stretcher. Physically safe environment: no spills, clutter or unnecessary equipment. Stretcher in lowest position, wheels locked, appropriate side rails in place.
active

## 2023-06-24 ENCOUNTER — INPATIENT (INPATIENT)
Facility: HOSPITAL | Age: 58
LOS: 10 days | Discharge: NURSING, REHAB OR LONG-TERM CARE | DRG: 751 | End: 2023-07-05
Attending: PSYCHIATRY & NEUROLOGY | Admitting: PSYCHIATRY & NEUROLOGY
Payer: MEDICAID

## 2023-06-24 VITALS
HEIGHT: 64 IN | TEMPERATURE: 98 F | RESPIRATION RATE: 18 BRPM | OXYGEN SATURATION: 98 % | WEIGHT: 160.06 LBS | DIASTOLIC BLOOD PRESSURE: 86 MMHG | HEART RATE: 103 BPM | SYSTOLIC BLOOD PRESSURE: 187 MMHG

## 2023-06-24 DIAGNOSIS — Z98.891 HISTORY OF UTERINE SCAR FROM PREVIOUS SURGERY: Chronic | ICD-10-CM

## 2023-06-24 DIAGNOSIS — Z90.49 ACQUIRED ABSENCE OF OTHER SPECIFIED PARTS OF DIGESTIVE TRACT: Chronic | ICD-10-CM

## 2023-06-24 DIAGNOSIS — F32.A DEPRESSION, UNSPECIFIED: ICD-10-CM

## 2023-06-24 DIAGNOSIS — F41.1 GENERALIZED ANXIETY DISORDER: ICD-10-CM

## 2023-06-24 DIAGNOSIS — F14.10 COCAINE ABUSE, UNCOMPLICATED: ICD-10-CM

## 2023-06-24 LAB
ALBUMIN SERPL ELPH-MCNC: 4.3 G/DL — SIGNIFICANT CHANGE UP (ref 3.5–5.2)
ALP SERPL-CCNC: 75 U/L — SIGNIFICANT CHANGE UP (ref 30–115)
ALT FLD-CCNC: 27 U/L — SIGNIFICANT CHANGE UP (ref 0–41)
ANION GAP SERPL CALC-SCNC: 12 MMOL/L — SIGNIFICANT CHANGE UP (ref 7–14)
APAP SERPL-MCNC: <5 UG/ML — LOW (ref 10–30)
AST SERPL-CCNC: 29 U/L — SIGNIFICANT CHANGE UP (ref 0–41)
BASOPHILS # BLD AUTO: 0.03 K/UL — SIGNIFICANT CHANGE UP (ref 0–0.2)
BASOPHILS NFR BLD AUTO: 0.6 % — SIGNIFICANT CHANGE UP (ref 0–1)
BILIRUB SERPL-MCNC: 0.3 MG/DL — SIGNIFICANT CHANGE UP (ref 0.2–1.2)
BUN SERPL-MCNC: 11 MG/DL — SIGNIFICANT CHANGE UP (ref 10–20)
CALCIUM SERPL-MCNC: 9.1 MG/DL — SIGNIFICANT CHANGE UP (ref 8.4–10.5)
CHLORIDE SERPL-SCNC: 103 MMOL/L — SIGNIFICANT CHANGE UP (ref 98–110)
CO2 SERPL-SCNC: 24 MMOL/L — SIGNIFICANT CHANGE UP (ref 17–32)
CREAT SERPL-MCNC: 0.6 MG/DL — LOW (ref 0.7–1.5)
EGFR: 103 ML/MIN/1.73M2 — SIGNIFICANT CHANGE UP
EOSINOPHIL # BLD AUTO: 0.04 K/UL — SIGNIFICANT CHANGE UP (ref 0–0.7)
EOSINOPHIL NFR BLD AUTO: 0.8 % — SIGNIFICANT CHANGE UP (ref 0–8)
ETHANOL SERPL-MCNC: <10 MG/DL — SIGNIFICANT CHANGE UP
GLUCOSE SERPL-MCNC: 118 MG/DL — HIGH (ref 70–99)
HCT VFR BLD CALC: 37.9 % — SIGNIFICANT CHANGE UP (ref 37–47)
HGB BLD-MCNC: 12.8 G/DL — SIGNIFICANT CHANGE UP (ref 12–16)
IMM GRANULOCYTES NFR BLD AUTO: 0.2 % — SIGNIFICANT CHANGE UP (ref 0.1–0.3)
LYMPHOCYTES # BLD AUTO: 2.55 K/UL — SIGNIFICANT CHANGE UP (ref 1.2–3.4)
LYMPHOCYTES # BLD AUTO: 48.1 % — SIGNIFICANT CHANGE UP (ref 20.5–51.1)
MCHC RBC-ENTMCNC: 30 PG — SIGNIFICANT CHANGE UP (ref 27–31)
MCHC RBC-ENTMCNC: 33.8 G/DL — SIGNIFICANT CHANGE UP (ref 32–37)
MCV RBC AUTO: 88.8 FL — SIGNIFICANT CHANGE UP (ref 81–99)
MONOCYTES # BLD AUTO: 0.38 K/UL — SIGNIFICANT CHANGE UP (ref 0.1–0.6)
MONOCYTES NFR BLD AUTO: 7.2 % — SIGNIFICANT CHANGE UP (ref 1.7–9.3)
NEUTROPHILS # BLD AUTO: 2.29 K/UL — SIGNIFICANT CHANGE UP (ref 1.4–6.5)
NEUTROPHILS NFR BLD AUTO: 43.1 % — SIGNIFICANT CHANGE UP (ref 42.2–75.2)
NRBC # BLD: 0 /100 WBCS — SIGNIFICANT CHANGE UP (ref 0–0)
PLATELET # BLD AUTO: 110 K/UL — LOW (ref 130–400)
PMV BLD: 11.2 FL — HIGH (ref 7.4–10.4)
POTASSIUM SERPL-MCNC: 3.9 MMOL/L — SIGNIFICANT CHANGE UP (ref 3.5–5)
POTASSIUM SERPL-SCNC: 3.9 MMOL/L — SIGNIFICANT CHANGE UP (ref 3.5–5)
PROT SERPL-MCNC: 8.3 G/DL — HIGH (ref 6–8)
RBC # BLD: 4.27 M/UL — SIGNIFICANT CHANGE UP (ref 4.2–5.4)
RBC # FLD: 14.2 % — SIGNIFICANT CHANGE UP (ref 11.5–14.5)
SALICYLATES SERPL-MCNC: <0.3 MG/DL — LOW (ref 4–30)
SARS-COV-2 RNA SPEC QL NAA+PROBE: SIGNIFICANT CHANGE UP
SODIUM SERPL-SCNC: 139 MMOL/L — SIGNIFICANT CHANGE UP (ref 135–146)
WBC # BLD: 5.3 K/UL — SIGNIFICANT CHANGE UP (ref 4.8–10.8)
WBC # FLD AUTO: 5.3 K/UL — SIGNIFICANT CHANGE UP (ref 4.8–10.8)

## 2023-06-24 PROCEDURE — 99285 EMERGENCY DEPT VISIT HI MDM: CPT

## 2023-06-24 PROCEDURE — 93010 ELECTROCARDIOGRAM REPORT: CPT

## 2023-06-24 RX ORDER — AMLODIPINE BESYLATE 2.5 MG/1
10 TABLET ORAL ONCE
Refills: 0 | Status: COMPLETED | OUTPATIENT
Start: 2023-06-24 | End: 2023-06-24

## 2023-06-24 RX ORDER — CLONAZEPAM 1 MG
1 TABLET ORAL ONCE
Refills: 0 | Status: DISCONTINUED | OUTPATIENT
Start: 2023-06-24 | End: 2023-06-24

## 2023-06-24 RX ORDER — HALOPERIDOL DECANOATE 100 MG/ML
5 INJECTION INTRAMUSCULAR EVERY 6 HOURS
Refills: 0 | Status: DISCONTINUED | OUTPATIENT
Start: 2023-06-25 | End: 2023-07-05

## 2023-06-24 RX ORDER — HYDRALAZINE HCL 50 MG
25 TABLET ORAL
Refills: 0 | Status: DISCONTINUED | OUTPATIENT
Start: 2023-06-25 | End: 2023-07-05

## 2023-06-24 RX ORDER — TRAZODONE HCL 50 MG
150 TABLET ORAL ONCE
Refills: 0 | Status: COMPLETED | OUTPATIENT
Start: 2023-06-24 | End: 2023-06-24

## 2023-06-24 RX ORDER — AMLODIPINE BESYLATE 2.5 MG/1
10 TABLET ORAL DAILY
Refills: 0 | Status: DISCONTINUED | OUTPATIENT
Start: 2023-06-25 | End: 2023-07-05

## 2023-06-24 RX ORDER — CLONAZEPAM 1 MG
1 TABLET ORAL
Refills: 0 | Status: DISCONTINUED | OUTPATIENT
Start: 2023-06-25 | End: 2023-06-29

## 2023-06-24 RX ORDER — DULOXETINE HYDROCHLORIDE 30 MG/1
60 CAPSULE, DELAYED RELEASE ORAL DAILY
Refills: 0 | Status: DISCONTINUED | OUTPATIENT
Start: 2023-06-25 | End: 2023-07-05

## 2023-06-24 RX ORDER — TRAZODONE HCL 50 MG
150 TABLET ORAL AT BEDTIME
Refills: 0 | Status: DISCONTINUED | OUTPATIENT
Start: 2023-06-25 | End: 2023-07-05

## 2023-06-24 RX ADMIN — Medication 1 MILLIGRAM(S): at 21:40

## 2023-06-24 RX ADMIN — AMLODIPINE BESYLATE 10 MILLIGRAM(S): 2.5 TABLET ORAL at 21:39

## 2023-06-24 RX ADMIN — Medication 150 MILLIGRAM(S): at 23:40
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
## 2023-06-24 NOTE — ED BEHAVIORAL HEALTH ASSESSMENT NOTE - PAST PSYCHOTROPIC MEDICATION
buprenorphine-naloxone 8 mg-2 mg sublingual tablet  gabapentin 100 mg oral capsule  escitalopram 10 mg oral tablet

## 2023-06-24 NOTE — ED BEHAVIORAL HEALTH ASSESSMENT NOTE - DESCRIPTION
domiciled alone in a private residence, , no dependents, has three adult sons, unemployed, Patient seen by psychiatric team. HTN

## 2023-06-24 NOTE — ED PROVIDER NOTE - ATTENDING APP SHARED VISIT CONTRIBUTION OF CARE
57 yo f with pmh of depression, anxiety, htn, presents with c/o SI.  pt denies any particular plans.  no cp, sob, abd pain, n/v/d.  no fever or chills.  exam: nad, ncat, perrl, eomi, mmm, rrr, ctab, abd soft, nt, nd aox3, no calf tenderness, no pitting edema imp: pt with SI, labs and psych consult

## 2023-06-24 NOTE — ED PROVIDER NOTE - NS ED ATTENDING STATEMENT MOD
This was a shared visit with the NEGAR. I reviewed and verified the documentation and independently performed the documented:

## 2023-06-24 NOTE — ED ADULT NURSE NOTE - OBJECTIVE STATEMENT
Patient a+ox3 hx of bipolar and anxiety c/o feeling depressed X 1 week with suicidal thoughts- denies HI.   1:1 continued

## 2023-06-24 NOTE — ED BEHAVIORAL HEALTH ASSESSMENT NOTE - RISK ASSESSMENT
Risk factors - previous suicide attempts, substance use, triggering events leading to shame,     Protective factors - supportive family, residential stability, no access to firearms,

## 2023-06-24 NOTE — ED PROVIDER NOTE - OBJECTIVE STATEMENT
57 yo F with pmhx of HTN, depression, anxiety presenting for evaluation of suicidal ideations. Endorses substance use. Has no plan. No cp, sob, fever, chills, abdominal pain, nausea, vomiting, diarrhea, back pain, urinary symptoms, headache, dizziness, paresthesias, or weakness. No homicidal or suicidal ideations.

## 2023-06-24 NOTE — ED ADULT NURSE NOTE - NSFALLUNIVINTERV_ED_ALL_ED
Bed/Stretcher in lowest position, wheels locked, appropriate side rails in place/Call bell, personal items and telephone in reach/Instruct patient to call for assistance before getting out of bed/chair/stretcher/Non-slip footwear applied when patient is off stretcher/[place] to call system/Physically safe environment - no spills, clutter or unnecessary equipment/Purposeful proactive rounding/Room/bathroom lighting operational, light cord in reach
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
## 2023-06-24 NOTE — ED ADULT TRIAGE NOTE - CHIEF COMPLAINT QUOTE
Patient a+ox3 hx of bipolar and anxiety co feeling depressed X 1 week with suicidal thoughts- denies HI. 1:1 initiated in triage

## 2023-06-24 NOTE — ED PROVIDER NOTE - CLINICAL SUMMARY MEDICAL DECISION MAKING FREE TEXT BOX
Pt with SI, psych evaluated pt and will admit to IPP at [facility].  Any ordered labs and EKG were reviewed.  Any imaging was ordered and reviewed by me.  Appropriate medications for patient's presenting complaints were ordered and effects were reassessed.  Patient's records (prior hospital, ED visit, and/or nursing home notes if available) were reviewed.  Additional history was obtained from EMS, family, and/or PCP (where available).  Escalation to admission/observation was considered.  Patient requires inpatient hospitalization - monitored setting.

## 2023-06-24 NOTE — ED BEHAVIORAL HEALTH ASSESSMENT NOTE - CURRENT MEDICATION
- Klonopin 1 mg BID  - Duloxetine 60 mg qdaily  - Trazodone 150 mg qHS  - Amlodipine 10 mg qdaily  - Hydralazine 25 mg BID  - Propranolol 10 mg BID

## 2023-06-24 NOTE — ED BEHAVIORAL HEALTH ASSESSMENT NOTE - NSBHSACOC_PSY_A_CORE FT
First use age 33, last use 3 weeks ago, quantity is smoking $100 worth per day, using consistently for a few years now

## 2023-06-24 NOTE — ED BEHAVIORAL HEALTH ASSESSMENT NOTE - NSBHMSETHTPROC_PSY_A_CORE
September 28, 2018      Noé Schmidt  [address] [id number]          Dear Mr. Schmidt:    Crista BURKS MD has ordered a colonoscopy for you and we would like to schedule that procedure. Our attempts to reach you by phone have been unsuccessful.    Please call Open Access Scheduling Patient Line [phone] at your earliest convenience. Let the  know that your paperwork is started, and that you are calling to arrange a date and time for the procedure.      If you have any questions or concerns, we would be more than happy to discuss them with you. We look forward to assisting you with your health care needs.      Sincerely,  Open Access Scheduling Patient Line [phone]  Surgery Scheduler for Open Access Colonoscopy Scheduling  [facility] Pre-Admit Department   Linear/Normal reasoning

## 2023-06-25 DIAGNOSIS — F14.10 COCAINE ABUSE, UNCOMPLICATED: ICD-10-CM

## 2023-06-25 DIAGNOSIS — F41.9 ANXIETY DISORDER, UNSPECIFIED: ICD-10-CM

## 2023-06-25 DIAGNOSIS — R45.851 SUICIDAL IDEATIONS: ICD-10-CM

## 2023-06-25 LAB
ALBUMIN SERPL ELPH-MCNC: 4 G/DL — SIGNIFICANT CHANGE UP (ref 3.5–5.2)
ALP SERPL-CCNC: 76 U/L — SIGNIFICANT CHANGE UP (ref 30–115)
ALT FLD-CCNC: 25 U/L — SIGNIFICANT CHANGE UP (ref 0–41)
ANION GAP SERPL CALC-SCNC: 13 MMOL/L — SIGNIFICANT CHANGE UP (ref 7–14)
APPEARANCE UR: CLEAR — SIGNIFICANT CHANGE UP
AST SERPL-CCNC: 27 U/L — SIGNIFICANT CHANGE UP (ref 0–41)
BACTERIA # UR AUTO: ABNORMAL /HPF
BASOPHILS # BLD AUTO: 0.02 K/UL — SIGNIFICANT CHANGE UP (ref 0–0.2)
BASOPHILS NFR BLD AUTO: 0.5 % — SIGNIFICANT CHANGE UP (ref 0–1)
BILIRUB SERPL-MCNC: 0.3 MG/DL — SIGNIFICANT CHANGE UP (ref 0.2–1.2)
BILIRUB UR-MCNC: NEGATIVE — SIGNIFICANT CHANGE UP
BUN SERPL-MCNC: 17 MG/DL — SIGNIFICANT CHANGE UP (ref 10–20)
CALCIUM SERPL-MCNC: 8.9 MG/DL — SIGNIFICANT CHANGE UP (ref 8.4–10.5)
CHLORIDE SERPL-SCNC: 98 MMOL/L — SIGNIFICANT CHANGE UP (ref 98–110)
CHOLEST SERPL-MCNC: 157 MG/DL — SIGNIFICANT CHANGE UP
CO2 SERPL-SCNC: 25 MMOL/L — SIGNIFICANT CHANGE UP (ref 17–32)
COLOR SPEC: YELLOW — SIGNIFICANT CHANGE UP
CREAT SERPL-MCNC: 0.7 MG/DL — SIGNIFICANT CHANGE UP (ref 0.7–1.5)
DIFF PNL FLD: NEGATIVE — SIGNIFICANT CHANGE UP
EGFR: 100 ML/MIN/1.73M2 — SIGNIFICANT CHANGE UP
EOSINOPHIL # BLD AUTO: 0.04 K/UL — SIGNIFICANT CHANGE UP (ref 0–0.7)
EOSINOPHIL NFR BLD AUTO: 0.9 % — SIGNIFICANT CHANGE UP (ref 0–8)
EPI CELLS # UR: ABNORMAL /HPF (ref 0–5)
GLUCOSE SERPL-MCNC: 124 MG/DL — HIGH (ref 70–99)
GLUCOSE UR QL: NEGATIVE MG/DL — SIGNIFICANT CHANGE UP
HCT VFR BLD CALC: 37.1 % — SIGNIFICANT CHANGE UP (ref 37–47)
HDLC SERPL-MCNC: 54 MG/DL — SIGNIFICANT CHANGE UP
HGB BLD-MCNC: 12.4 G/DL — SIGNIFICANT CHANGE UP (ref 12–16)
IMM GRANULOCYTES NFR BLD AUTO: 0.2 % — SIGNIFICANT CHANGE UP (ref 0.1–0.3)
KETONES UR-MCNC: NEGATIVE — SIGNIFICANT CHANGE UP
LEUKOCYTE ESTERASE UR-ACNC: ABNORMAL
LIPID PNL WITH DIRECT LDL SERPL: 76 MG/DL — SIGNIFICANT CHANGE UP
LYMPHOCYTES # BLD AUTO: 1.83 K/UL — SIGNIFICANT CHANGE UP (ref 1.2–3.4)
LYMPHOCYTES # BLD AUTO: 41.9 % — SIGNIFICANT CHANGE UP (ref 20.5–51.1)
MAGNESIUM SERPL-MCNC: 1.8 MG/DL — SIGNIFICANT CHANGE UP (ref 1.8–2.4)
MCHC RBC-ENTMCNC: 29.2 PG — SIGNIFICANT CHANGE UP (ref 27–31)
MCHC RBC-ENTMCNC: 33.4 G/DL — SIGNIFICANT CHANGE UP (ref 32–37)
MCV RBC AUTO: 87.3 FL — SIGNIFICANT CHANGE UP (ref 81–99)
MONOCYTES # BLD AUTO: 0.38 K/UL — SIGNIFICANT CHANGE UP (ref 0.1–0.6)
MONOCYTES NFR BLD AUTO: 8.7 % — SIGNIFICANT CHANGE UP (ref 1.7–9.3)
NEUTROPHILS # BLD AUTO: 2.09 K/UL — SIGNIFICANT CHANGE UP (ref 1.4–6.5)
NEUTROPHILS NFR BLD AUTO: 47.8 % — SIGNIFICANT CHANGE UP (ref 42.2–75.2)
NITRITE UR-MCNC: NEGATIVE — SIGNIFICANT CHANGE UP
NON HDL CHOLESTEROL: 103 MG/DL — SIGNIFICANT CHANGE UP
NRBC # BLD: 0 /100 WBCS — SIGNIFICANT CHANGE UP (ref 0–0)
PH UR: 6 — SIGNIFICANT CHANGE UP (ref 5–8)
PLATELET # BLD AUTO: 108 K/UL — LOW (ref 130–400)
PMV BLD: 10.8 FL — HIGH (ref 7.4–10.4)
POTASSIUM SERPL-MCNC: 4 MMOL/L — SIGNIFICANT CHANGE UP (ref 3.5–5)
POTASSIUM SERPL-SCNC: 4 MMOL/L — SIGNIFICANT CHANGE UP (ref 3.5–5)
PROT SERPL-MCNC: 7.9 G/DL — SIGNIFICANT CHANGE UP (ref 6–8)
PROT UR-MCNC: NEGATIVE MG/DL — SIGNIFICANT CHANGE UP
RBC # BLD: 4.25 M/UL — SIGNIFICANT CHANGE UP (ref 4.2–5.4)
RBC # FLD: 14.1 % — SIGNIFICANT CHANGE UP (ref 11.5–14.5)
RBC CASTS # UR COMP ASSIST: SIGNIFICANT CHANGE UP /HPF (ref 0–4)
SODIUM SERPL-SCNC: 136 MMOL/L — SIGNIFICANT CHANGE UP (ref 135–146)
SP GR SPEC: 1.01 — SIGNIFICANT CHANGE UP (ref 1.01–1.03)
TRIGL SERPL-MCNC: 135 MG/DL — SIGNIFICANT CHANGE UP
UROBILINOGEN FLD QL: 0.2 MG/DL — SIGNIFICANT CHANGE UP
WBC # BLD: 4.37 K/UL — LOW (ref 4.8–10.8)
WBC # FLD AUTO: 4.37 K/UL — LOW (ref 4.8–10.8)
WBC UR QL: ABNORMAL /HPF (ref 0–5)

## 2023-06-25 PROCEDURE — 80307 DRUG TEST PRSMV CHEM ANLYZR: CPT

## 2023-06-25 PROCEDURE — 83735 ASSAY OF MAGNESIUM: CPT

## 2023-06-25 PROCEDURE — 93005 ELECTROCARDIOGRAM TRACING: CPT

## 2023-06-25 PROCEDURE — 87635 SARS-COV-2 COVID-19 AMP PRB: CPT

## 2023-06-25 PROCEDURE — 83036 HEMOGLOBIN GLYCOSYLATED A1C: CPT

## 2023-06-25 PROCEDURE — 84443 ASSAY THYROID STIM HORMONE: CPT

## 2023-06-25 PROCEDURE — 99221 1ST HOSP IP/OBS SF/LOW 40: CPT | Mod: GC

## 2023-06-25 PROCEDURE — 85025 COMPLETE CBC W/AUTO DIFF WBC: CPT

## 2023-06-25 PROCEDURE — 93010 ELECTROCARDIOGRAM REPORT: CPT

## 2023-06-25 PROCEDURE — 99232 SBSQ HOSP IP/OBS MODERATE 35: CPT

## 2023-06-25 PROCEDURE — 80053 COMPREHEN METABOLIC PANEL: CPT

## 2023-06-25 PROCEDURE — 80061 LIPID PANEL: CPT

## 2023-06-25 PROCEDURE — 81001 URINALYSIS AUTO W/SCOPE: CPT

## 2023-06-25 RX ORDER — ACETAMINOPHEN 500 MG
650 TABLET ORAL EVERY 6 HOURS
Refills: 0 | Status: DISCONTINUED | OUTPATIENT
Start: 2023-06-25 | End: 2023-07-05

## 2023-06-25 RX ORDER — POLYETHYLENE GLYCOL 3350 17 G/17G
17 POWDER, FOR SOLUTION ORAL DAILY
Refills: 0 | Status: DISCONTINUED | OUTPATIENT
Start: 2023-06-25 | End: 2023-07-05

## 2023-06-25 RX ORDER — PRAZOSIN HCL 2 MG
2 CAPSULE ORAL AT BEDTIME
Refills: 0 | Status: DISCONTINUED | OUTPATIENT
Start: 2023-06-25 | End: 2023-07-05

## 2023-06-25 RX ORDER — SENNA PLUS 8.6 MG/1
2 TABLET ORAL AT BEDTIME
Refills: 0 | Status: DISCONTINUED | OUTPATIENT
Start: 2023-06-25 | End: 2023-07-05

## 2023-06-25 RX ADMIN — SENNA PLUS 2 TABLET(S): 8.6 TABLET ORAL at 20:03

## 2023-06-25 RX ADMIN — Medication 150 MILLIGRAM(S): at 20:02

## 2023-06-25 RX ADMIN — Medication 25 MILLIGRAM(S): at 20:01

## 2023-06-25 RX ADMIN — Medication 2 MILLIGRAM(S): at 20:01

## 2023-06-25 RX ADMIN — Medication 1 MILLIGRAM(S): at 20:04

## 2023-06-25 RX ADMIN — Medication 1 MILLIGRAM(S): at 08:11

## 2023-06-25 RX ADMIN — AMLODIPINE BESYLATE 10 MILLIGRAM(S): 2.5 TABLET ORAL at 08:11

## 2023-06-25 RX ADMIN — Medication 25 MILLIGRAM(S): at 08:11

## 2023-06-25 RX ADMIN — DULOXETINE HYDROCHLORIDE 60 MILLIGRAM(S): 30 CAPSULE, DELAYED RELEASE ORAL at 08:15

## 2023-06-25 RX ADMIN — POLYETHYLENE GLYCOL 3350 17 GRAM(S): 17 POWDER, FOR SOLUTION ORAL at 10:46
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
## 2023-06-25 NOTE — BH INPATIENT PSYCHIATRY ASSESSMENT NOTE - CURRENT MEDICATION
MEDICATIONS  (STANDING):  amLODIPine   Tablet 10 milliGRAM(s) Oral daily  clonazePAM  Tablet 1 milliGRAM(s) Oral two times a day  DULoxetine 60 milliGRAM(s) Oral daily  hydrALAZINE 25 milliGRAM(s) Oral two times a day  prazosin. 2 milliGRAM(s) Oral at bedtime  propranolol 10 milliGRAM(s) Oral two times a day  senna 2 Tablet(s) Oral at bedtime  traZODone 150 milliGRAM(s) Oral at bedtime    MEDICATIONS  (PRN):  acetaminophen     Tablet .. 650 milliGRAM(s) Oral every 6 hours PRN Temp greater or equal to 38C (100.4F), Mild Pain (1 - 3)  haloperidol     Tablet 5 milliGRAM(s) Oral every 6 hours PRN Agitation  LORazepam     Tablet 2 milliGRAM(s) Oral every 6 hours PRN Agitation  polyethylene glycol 3350 17 Gram(s) Oral daily PRN Constipation
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
## 2023-06-25 NOTE — BH INPATIENT PSYCHIATRY ASSESSMENT NOTE - NSICDXBHSECONDARYDX_PSY_ALL_CORE
Generalized anxiety disorder with panic attacks   F41.1  Cocaine abuse   F14.10   Anxiety   F41.9  Cocaine use disorder   F14.10

## 2023-06-25 NOTE — BH PATIENT PROFILE - HOME MEDICATIONS
ibuprofen 600 mg oral tablet , 1 tab(s) orally every 8 hours   amoxicillin 875 mg oral tablet , 1 tab(s) orally 2 times a day   cefpodoxime 200 mg oral tablet , 1 tab(s) orally 2 times a day, last day on 10/21  QUEtiapine 100 mg oral tablet , 1 tab(s) orally 2 times a day  buprenorphine-naloxone 8 mg-2 mg sublingual tablet , 2 tab(s) sublingual once a day  escitalopram 10 mg oral tablet , 1 tab(s) orally once a day  acetaminophen 325 mg oral tablet , 2 tab(s) orally every 6 hours, As needed, Temp greater or equal to 38C (100.4F), Mild Pain (1 - 3)  senna oral tablet , 2 tab(s) orally once a day (at bedtime)  losartan 100 mg oral tablet , 1 tab(s) orally once a day  simvastatin 40 mg oral tablet , 1 tab(s) orally once a day (at bedtime)  gabapentin 100 mg oral capsule , 1 cap(s) orally 3 times a day  omeprazole 20 mg oral delayed release capsule , 1 cap(s) orally once a day  Metoprolol Tartrate 100 mg oral tablet , 1 tab(s) orally 2 times a day  hydroCHLOROthiazide 12.5 mg oral capsule , 1 cap(s) orally once a day

## 2023-06-25 NOTE — CONSULT NOTE ADULT - ASSESSMENT
SUBJECTIVE:    Patient is a 58y old Female who presents with a chief complaint of   Currently admitted to medicine with the primary diagnosis of Suicidal ideation       Today is hospital day . This morning she is resting comfortably in bed and reports no new issues or overnight events.     ROS:   CONSTITUTIONAL: No weakness, fevers or chills   EYES/ENT: No visual changes; No vertigo or throat pain   NECK: No pain or stiffness   RESPIRATORY: No cough, wheezing, hemoptysis; No shortness of breath   CARDIOVASCULAR: No chest pain or palpitations   GASTROINTESTINAL: No abdominal or epigastric pain. No nausea, vomiting, or hematemesis;+ constipation. No melena or hematochezia.  GENITOURINARY: No dysuria, frequency or hematuria  NEUROLOGICAL: No numbness or weakness  SKIN: No itching, rashes      PAST MEDICAL & SURGICAL HISTORY  Anxiety    Depression    Panic attacks    Hypertension    History of cholecystectomy    H/O:       SOCIAL HISTORY:    ALLERGIES:  Benadryl (Unknown)    MEDICATIONS:  STANDING MEDICATIONS  amLODIPine   Tablet 10 milliGRAM(s) Oral daily  clonazePAM  Tablet 1 milliGRAM(s) Oral two times a day  DULoxetine 60 milliGRAM(s) Oral daily  hydrALAZINE 25 milliGRAM(s) Oral two times a day  propranolol 10 milliGRAM(s) Oral two times a day  traZODone 150 milliGRAM(s) Oral at bedtime    PRN MEDICATIONS  acetaminophen     Tablet .. 650 milliGRAM(s) Oral every 6 hours PRN  haloperidol     Tablet 5 milliGRAM(s) Oral every 6 hours PRN  LORazepam     Tablet 2 milliGRAM(s) Oral every 6 hours PRN    VITALS:   T(F): 98.9  HR: 101  BP: 127/75  RR: 16  SpO2: 99%    LABS:  Negative for smoking/alcohol/drug use.                         12.4   4.37  )-----------( 108      ( 2023 08:40 )             37.1     -    136  |  98  |  17  ----------------------------<  124<H>  4.0   |  25  |  0.7    Ca    8.9      2023 08:40  Mg     1.8         TPro  7.9  /  Alb  4.0  /  TBili  0.3  /  DBili  x   /  AST  27  /  ALT  25  /  AlkPhos  76        Urinalysis Basic - ( 2023 08:40 )    Color: x / Appearance: x / SG: x / pH: x  Gluc: 124 mg/dL / Ketone: x  / Bili: x / Urobili: x   Blood: x / Protein: x / Nitrite: x   Leuk Esterase: x / RBC: x / WBC x   Sq Epi: x / Non Sq Epi: x / Bacteria: x                RADIOLOGY:    PHYSICAL EXAM:  GEN: No acute distress  HEENT: normocephalic, atraumatic, aniceteric  LUNGS: Clear to auscultation bilaterally, no rales/wheezing/ rhonchi  HEART: S1/S2 present. RRR, no murmurs  ABD: Soft, non-tender, non-distended. Bowel sounds present  EXT: no edema   NEURO: non focal       ASSESSMENT AND PLAN:    59 yo F with PMH of HTN , MDD/ ? bipolar presents to the hospital with suicidal ideation, medicine consult called for med eval    # H/O ?  Bipolar disorder / MDD / Panic attacks  - no active SI/HI on my interview  - care per primary psych team    # Constipation  - senna, miralax (ordered)    # H/O HTN , controlled   - was on losartan and metoprolol at home   - please obtain updated records of patient's medications   - op fu with pcp    # H/O HLD,  - check lipid profile  - obtain updated record of patient's medications  - was on simvastatin 40 mg QHS  - op fu with pcp     thank you for the courtesy of this consult  recall prn

## 2023-06-25 NOTE — BH PATIENT PROFILE - NSSBIRTDRGPOSREINDET_GEN_A_CORE
She was not taking any illegal drugs for the last 3 weeks. Encouraged her continue abstinence from taking any illegal drugs.
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
## 2023-06-25 NOTE — BH INPATIENT PSYCHIATRY ASSESSMENT NOTE - NSBHMETABOLIC_PSY_ALL_CORE_FT
BMI: BMI (kg/m2): 27.9 (06-25-23 @ 01:28)  HbA1c:   Glucose:   BP: 127/75 (06-25-23 @ 08:57) (127/75 - 194/100)  Lipid Panel: Date/Time: 06-25-23 @ 08:40  Cholesterol, Serum: 157  Direct LDL: --  HDL Cholesterol, Serum: 54  Total Cholesterol/HDL Ration Measurement: --  Triglycerides, Serum: 135   BMI: BMI (kg/m2): 27.9 (06-25-23 @ 01:28)  HbA1c:   Glucose:   BP: 128/90 (06-25-23 @ 15:32) (127/75 - 194/100)  Lipid Panel: Date/Time: 06-25-23 @ 08:40  Cholesterol, Serum: 157  Direct LDL: --  HDL Cholesterol, Serum: 54  Total Cholesterol/HDL Ration Measurement: --  Triglycerides, Serum: 135

## 2023-06-25 NOTE — BH PATIENT PROFILE - FALL HARM RISK - UNIVERSAL INTERVENTIONS
Bed in lowest position, wheels locked, appropriate side rails in place/Call bell, personal items and telephone in reach/Instruct patient to call for assistance before getting out of bed or chair/Non-slip footwear when patient is out of bed/[place] to call system/Physically safe environment - no spills, clutter or unnecessary equipment/Purposeful Proactive Rounding/Room/bathroom lighting operational, light cord in reach

## 2023-06-25 NOTE — CONSULT NOTE ADULT - SUBJECTIVE AND OBJECTIVE BOX
SUBJECTIVE:    Patient is a 58y old Female who presents with a chief complaint of   Currently admitted to medicine with the primary diagnosis of Suicidal ideation       Today is hospital day . This morning she is resting comfortably in bed and reports no new issues or overnight events.     ROS:   CONSTITUTIONAL: No weakness, fevers or chills   EYES/ENT: No visual changes; No vertigo or throat pain   NECK: No pain or stiffness   RESPIRATORY: No cough, wheezing, hemoptysis; No shortness of breath   CARDIOVASCULAR: No chest pain or palpitations   GASTROINTESTINAL: No abdominal or epigastric pain. No nausea, vomiting, or hematemesis;+ constipation. No melena or hematochezia.  GENITOURINARY: No dysuria, frequency or hematuria  NEUROLOGICAL: No numbness or weakness  SKIN: No itching, rashes      PAST MEDICAL & SURGICAL HISTORY  Anxiety    Depression    Panic attacks    Hypertension    History of cholecystectomy    H/O:       SOCIAL HISTORY:    ALLERGIES:  Benadryl (Unknown)    MEDICATIONS:  STANDING MEDICATIONS  amLODIPine   Tablet 10 milliGRAM(s) Oral daily  clonazePAM  Tablet 1 milliGRAM(s) Oral two times a day  DULoxetine 60 milliGRAM(s) Oral daily  hydrALAZINE 25 milliGRAM(s) Oral two times a day  propranolol 10 milliGRAM(s) Oral two times a day  traZODone 150 milliGRAM(s) Oral at bedtime    PRN MEDICATIONS  acetaminophen     Tablet .. 650 milliGRAM(s) Oral every 6 hours PRN  haloperidol     Tablet 5 milliGRAM(s) Oral every 6 hours PRN  LORazepam     Tablet 2 milliGRAM(s) Oral every 6 hours PRN    VITALS:   T(F): 98.9  HR: 101  BP: 127/75  RR: 16  SpO2: 99%    LABS:  Negative for smoking/alcohol/drug use.                         12.4   4.37  )-----------( 108      ( 2023 08:40 )             37.1     -    136  |  98  |  17  ----------------------------<  124<H>  4.0   |  25  |  0.7    Ca    8.9      2023 08:40  Mg     1.8         TPro  7.9  /  Alb  4.0  /  TBili  0.3  /  DBili  x   /  AST  27  /  ALT  25  /  AlkPhos  76        Urinalysis Basic - ( 2023 08:40 )    Color: x / Appearance: x / SG: x / pH: x  Gluc: 124 mg/dL / Ketone: x  / Bili: x / Urobili: x   Blood: x / Protein: x / Nitrite: x   Leuk Esterase: x / RBC: x / WBC x   Sq Epi: x / Non Sq Epi: x / Bacteria: x                RADIOLOGY:    PHYSICAL EXAM:  GEN: No acute distress  HEENT: normocephalic, atraumatic, aniceteric  LUNGS: Clear to auscultation bilaterally, no rales/wheezing/ rhonchi  HEART: S1/S2 present. RRR, no murmurs  ABD: Soft, non-tender, non-distended. Bowel sounds present  EXT: no edema   NEURO: non focal       ASSESSMENT AND PLAN:    59 yo F with PMH of HTN , MDD/ ? bipolar presents to the hospital with suicidal ideation, medicine consult called for med eval    # H/O ?  Bipolar disorder / MDD / Panic attacks  - no active SI/HI on my interview  - care per primary psych team    # Constipation  - senna, miralax (ordered)    # H/O HTN , controlled   - was on losartan and metoprolol at home   - please obtain updated records of patient's medications   - dash diet   - op fu with pcp    # H/O HLD,  - check lipid profile  - obtain updated record of patient's medications  - was on simvastatin 40 mg QHS  - dash diet   - op fu with pcp     thank you for the courtesy of this consult  recall prn

## 2023-06-26 LAB
A1C WITH ESTIMATED AVERAGE GLUCOSE RESULT: 5.7 % — HIGH (ref 4–5.6)
ESTIMATED AVERAGE GLUCOSE: 117 MG/DL — HIGH (ref 68–114)
TSH SERPL-MCNC: 2.18 UIU/ML — SIGNIFICANT CHANGE UP (ref 0.27–4.2)

## 2023-06-26 PROCEDURE — 93010 ELECTROCARDIOGRAM REPORT: CPT

## 2023-06-26 PROCEDURE — 99231 SBSQ HOSP IP/OBS SF/LOW 25: CPT

## 2023-06-26 RX ORDER — LANOLIN ALCOHOL/MO/W.PET/CERES
5 CREAM (GRAM) TOPICAL AT BEDTIME
Refills: 0 | Status: DISCONTINUED | OUTPATIENT
Start: 2023-06-26 | End: 2023-07-05

## 2023-06-26 RX ADMIN — Medication 1 MILLIGRAM(S): at 20:35

## 2023-06-26 RX ADMIN — DULOXETINE HYDROCHLORIDE 60 MILLIGRAM(S): 30 CAPSULE, DELAYED RELEASE ORAL at 08:07

## 2023-06-26 RX ADMIN — Medication 150 MILLIGRAM(S): at 20:36

## 2023-06-26 RX ADMIN — Medication 5 MILLIGRAM(S): at 21:28

## 2023-06-26 RX ADMIN — AMLODIPINE BESYLATE 10 MILLIGRAM(S): 2.5 TABLET ORAL at 08:07

## 2023-06-26 RX ADMIN — Medication 25 MILLIGRAM(S): at 08:07

## 2023-06-26 RX ADMIN — SENNA PLUS 2 TABLET(S): 8.6 TABLET ORAL at 20:36

## 2023-06-26 RX ADMIN — Medication 1 MILLIGRAM(S): at 08:07

## 2023-06-26 RX ADMIN — Medication 25 MILLIGRAM(S): at 20:35

## 2023-06-26 RX ADMIN — Medication 2 MILLIGRAM(S): at 20:35

## 2023-06-26 NOTE — BH CHART NOTE - NSEVENTNOTEFT_PSY_ALL_CORE
Attempted to reach son Daljit, ([phone]) for collateral; unable to leave message at this time. Reached brother, Morgan () who has been out of the country, he will pass along message to nephew to call back.     Confirmed home medications with [facility] ().   Duloxetine delayed release 30mg daily  Amlodipine 10mg daily  Hydralazine 25mg BID  Klonopin 1mg BID  Trazadone 150mg qHS  Propranolol 10mg BID
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
## 2023-06-26 NOTE — BH INPATIENT PSYCHIATRY PROGRESS NOTE - NSBHCHARTREVIEWVS_PSY_A_CORE FT
Vital Signs Last 24 Hrs  T(C): 36.1 (06-26-23 @ 08:36), Max: 36.1 (06-26-23 @ 08:36)  T(F): 96.9 (06-26-23 @ 08:36), Max: 96.9 (06-26-23 @ 08:36)  HR: 101 (06-26-23 @ 08:36) (101 - 109)  BP: 99/69 (06-26-23 @ 08:36) (99/69 - 132/83)  BP(mean): --  RR: 18 (06-26-23 @ 08:36) (16 - 18)  SpO2: --     Vital Signs Last 24 Hrs  T(C): 35.8 (06-26-23 @ 15:40), Max: 36.1 (06-26-23 @ 08:36)  T(F): 96.5 (06-26-23 @ 15:40), Max: 96.9 (06-26-23 @ 08:36)  HR: 107 (06-26-23 @ 15:40) (101 - 107)  BP: 119/62 (06-26-23 @ 15:40) (99/69 - 132/83)  BP(mean): --  RR: 16 (06-26-23 @ 15:40) (16 - 18)  SpO2: --     Vital Signs Last 24 Hrs  T(C): 35.9 (06-27-23 @ 09:06), Max: 35.9 (06-27-23 @ 09:06)  T(F): 96.6 (06-27-23 @ 09:06), Max: 96.6 (06-27-23 @ 09:06)  HR: 109 (06-27-23 @ 09:06) (107 - 109)  BP: 123/75 (06-27-23 @ 09:06) (119/62 - 134/79)  BP(mean): --  RR: 109 (06-27-23 @ 09:06) (16 - 109)  SpO2: --

## 2023-06-26 NOTE — BH SOCIAL WORK INITIAL PSYCHOSOCIAL EVALUATION - OTHER PAST PSYCHIATRIC HISTORY (INCLUDE DETAILS REGARDING ONSET, COURSE OF ILLNESS, INPATIENT/OUTPATIENT TREATMENT)
Patient has a past psychiatric history of depression and anxiety.  She has a history of substance use (crack cocaine, benzodiazepines).  Patient reports 5 prior IPP admissions (most recently at [facility] in 12/2018).  She also reports 5 prior suicide attempts
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
## 2023-06-26 NOTE — BH INPATIENT PSYCHIATRY PROGRESS NOTE - NSBHMETABOLIC_PSY_ALL_CORE_FT
BMI: BMI (kg/m2): 27.9 (06-25-23 @ 01:28)  HbA1c: A1C with Estimated Average Glucose Result: 5.7 % (06-25-23 @ 08:40)    Glucose:   BP: 99/69 (06-26-23 @ 08:36) (99/69 - 194/100)  Lipid Panel: Date/Time: 06-25-23 @ 08:40  Cholesterol, Serum: 157  Direct LDL: --  HDL Cholesterol, Serum: 54  Total Cholesterol/HDL Ration Measurement: --  Triglycerides, Serum: 135   BMI: BMI (kg/m2): 27.9 (06-25-23 @ 01:28)  HbA1c: A1C with Estimated Average Glucose Result: 5.7 % (06-25-23 @ 08:40)    Glucose:   BP: 119/62 (06-26-23 @ 15:40) (99/69 - 194/100)  Lipid Panel: Date/Time: 06-25-23 @ 08:40  Cholesterol, Serum: 157  Direct LDL: --  HDL Cholesterol, Serum: 54  Total Cholesterol/HDL Ration Measurement: --  Triglycerides, Serum: 135   BMI: BMI (kg/m2): 27.9 (06-25-23 @ 01:28)  HbA1c: A1C with Estimated Average Glucose Result: 5.7 % (06-25-23 @ 08:40)    Glucose:   BP: 123/75 (06-27-23 @ 09:06) (99/69 - 194/100)  Lipid Panel: Date/Time: 06-25-23 @ 08:40  Cholesterol, Serum: 157  Direct LDL: --  HDL Cholesterol, Serum: 54  Total Cholesterol/HDL Ration Measurement: --  Triglycerides, Serum: 135

## 2023-06-26 NOTE — BH INPATIENT PSYCHIATRY PROGRESS NOTE - CURRENT MEDICATION
MEDICATIONS  (STANDING):  amLODIPine   Tablet 10 milliGRAM(s) Oral daily  clonazePAM  Tablet 1 milliGRAM(s) Oral two times a day  DULoxetine 60 milliGRAM(s) Oral daily  hydrALAZINE 25 milliGRAM(s) Oral two times a day  prazosin. 2 milliGRAM(s) Oral at bedtime  propranolol 10 milliGRAM(s) Oral two times a day  senna 2 Tablet(s) Oral at bedtime  traZODone 150 milliGRAM(s) Oral at bedtime    MEDICATIONS  (PRN):  acetaminophen     Tablet .. 650 milliGRAM(s) Oral every 6 hours PRN Temp greater or equal to 38C (100.4F), Mild Pain (1 - 3)  haloperidol     Tablet 5 milliGRAM(s) Oral every 6 hours PRN Agitation  melatonin. 5 milliGRAM(s) Oral at bedtime PRN Insomnia  polyethylene glycol 3350 17 Gram(s) Oral daily PRN Constipation

## 2023-06-27 PROCEDURE — 99231 SBSQ HOSP IP/OBS SF/LOW 25: CPT

## 2023-06-27 RX ADMIN — Medication 1 MILLIGRAM(S): at 08:36

## 2023-06-27 RX ADMIN — Medication 25 MILLIGRAM(S): at 08:35

## 2023-06-27 RX ADMIN — Medication 650 MILLIGRAM(S): at 12:37

## 2023-06-27 RX ADMIN — Medication 650 MILLIGRAM(S): at 22:28

## 2023-06-27 RX ADMIN — SENNA PLUS 2 TABLET(S): 8.6 TABLET ORAL at 20:27

## 2023-06-27 RX ADMIN — Medication 1 MILLIGRAM(S): at 20:26

## 2023-06-27 RX ADMIN — AMLODIPINE BESYLATE 10 MILLIGRAM(S): 2.5 TABLET ORAL at 08:35

## 2023-06-27 RX ADMIN — DULOXETINE HYDROCHLORIDE 60 MILLIGRAM(S): 30 CAPSULE, DELAYED RELEASE ORAL at 08:35

## 2023-06-27 RX ADMIN — Medication 150 MILLIGRAM(S): at 20:27

## 2023-06-27 RX ADMIN — Medication 2 MILLIGRAM(S): at 20:28

## 2023-06-27 RX ADMIN — Medication 25 MILLIGRAM(S): at 20:28

## 2023-06-27 RX ADMIN — Medication 650 MILLIGRAM(S): at 21:28

## 2023-06-27 NOTE — BH INPATIENT PSYCHIATRY PROGRESS NOTE - NSBHMETABOLIC_PSY_ALL_CORE_FT
BMI: BMI (kg/m2): 27.9 (06-25-23 @ 01:28)  HbA1c: A1C with Estimated Average Glucose Result: 5.7 % (06-25-23 @ 08:40)    Glucose:   BP: 123/75 (06-27-23 @ 09:06) (99/69 - 194/100)  Lipid Panel: Date/Time: 06-25-23 @ 08:40  Cholesterol, Serum: 157  Direct LDL: --  HDL Cholesterol, Serum: 54  Total Cholesterol/HDL Ration Measurement: --  Triglycerides, Serum: 135
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
## 2023-06-27 NOTE — BH INPATIENT PSYCHIATRY PROGRESS NOTE - NSBHFUPINTERVALHXFT_PSY_A_CORE
Patient seen and evaluated. As per nursing report melatonin for sleep. On approach patient calm and cooperative. No agitation or aggression noted. States she slept a little better with the melatonin. Patient states she is feeling better. Continues to have periods of depression. Cymbalta recently increased. Will continue to monitor. Denies any suicidal/homicidal ideations. Denies any A/V hallucinations. Patient states she would like to go to Rehab. CATCH team in place. Patient visible on the unit engaging with peers and attending groups.

## 2023-06-27 NOTE — BH INPATIENT PSYCHIATRY PROGRESS NOTE - NSBHCHARTREVIEWVS_PSY_A_CORE FT
Vital Signs Last 24 Hrs  T(C): 35.9 (06-27-23 @ 09:06), Max: 35.9 (06-27-23 @ 09:06)  T(F): 96.6 (06-27-23 @ 09:06), Max: 96.6 (06-27-23 @ 09:06)  HR: 109 (06-27-23 @ 09:06) (107 - 109)  BP: 123/75 (06-27-23 @ 09:06) (119/62 - 134/79)  BP(mean): --  RR: 109 (06-27-23 @ 09:06) (16 - 109)  SpO2: --

## 2023-06-28 LAB
AMPHET UR-MCNC: NEGATIVE NG/ML — SIGNIFICANT CHANGE UP
BARBITURATES UR QL SCN: NEGATIVE NG/ML — SIGNIFICANT CHANGE UP
BARBITURATES UR-MCNC: NEGATIVE NG/ML — SIGNIFICANT CHANGE UP
BENZODIAZ UR-MCNC: NEGATIVE NG/ML — SIGNIFICANT CHANGE UP
COCAINE METAB.OTHER UR-MCNC: NEGATIVE NG/ML — SIGNIFICANT CHANGE UP
CREATININE, URINE THERAPEUTIC: 34.3 MG/DL — SIGNIFICANT CHANGE UP (ref 20–300)
FENTANYL RESULT, UR: NEGATIVE NG/ML — SIGNIFICANT CHANGE UP
FENTANYL UR QL SCN: NEGATIVE NG/ML — SIGNIFICANT CHANGE UP
Lab: SIGNIFICANT CHANGE UP
METHADONE UR-MCNC: NEGATIVE NG/ML — SIGNIFICANT CHANGE UP
OPIATES UR-MCNC: NEGATIVE NG/ML — SIGNIFICANT CHANGE UP
OXYCODONE UR QL SCN: NEGATIVE NG/ML — SIGNIFICANT CHANGE UP
PCP UR-MCNC: NEGATIVE NG/ML — SIGNIFICANT CHANGE UP
PH, URINE RESULT: 6.3 — SIGNIFICANT CHANGE UP (ref 4.5–8.9)
THC UR QL: NEGATIVE NG/ML — SIGNIFICANT CHANGE UP

## 2023-06-28 PROCEDURE — 99231 SBSQ HOSP IP/OBS SF/LOW 25: CPT

## 2023-06-28 RX ORDER — IBUPROFEN 200 MG
400 TABLET ORAL ONCE
Refills: 0 | Status: COMPLETED | OUTPATIENT
Start: 2023-06-28 | End: 2023-06-28

## 2023-06-28 RX ORDER — IBUPROFEN 200 MG
400 TABLET ORAL EVERY 6 HOURS
Refills: 0 | Status: DISCONTINUED | OUTPATIENT
Start: 2023-06-28 | End: 2023-07-05

## 2023-06-28 RX ORDER — DIPHENHYDRAMINE HCL 50 MG
25 CAPSULE ORAL EVERY 4 HOURS
Refills: 0 | Status: DISCONTINUED | OUTPATIENT
Start: 2023-06-28 | End: 2023-07-05

## 2023-06-28 RX ADMIN — DULOXETINE HYDROCHLORIDE 60 MILLIGRAM(S): 30 CAPSULE, DELAYED RELEASE ORAL at 08:12

## 2023-06-28 RX ADMIN — Medication 400 MILLIGRAM(S): at 04:34

## 2023-06-28 RX ADMIN — Medication 1 MILLIGRAM(S): at 08:12

## 2023-06-28 RX ADMIN — Medication 400 MILLIGRAM(S): at 03:34

## 2023-06-28 RX ADMIN — Medication 1 MILLIGRAM(S): at 20:18

## 2023-06-28 RX ADMIN — Medication 2 MILLIGRAM(S): at 20:18

## 2023-06-28 RX ADMIN — Medication 25 MILLIGRAM(S): at 23:37

## 2023-06-28 RX ADMIN — SENNA PLUS 2 TABLET(S): 8.6 TABLET ORAL at 20:18

## 2023-06-28 RX ADMIN — AMLODIPINE BESYLATE 10 MILLIGRAM(S): 2.5 TABLET ORAL at 16:26

## 2023-06-28 RX ADMIN — Medication 150 MILLIGRAM(S): at 20:18

## 2023-06-28 RX ADMIN — Medication 25 MILLIGRAM(S): at 20:18

## 2023-06-28 NOTE — BH SAFETY PLAN - ENVIRONMENT SAFETY 1:
One way I would make my environment safe would be to call for help once I get into oe of these bad moods.
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
## 2023-06-28 NOTE — BH INPATIENT PSYCHIATRY PROGRESS NOTE - NSBHFUPINTERVALHXFT_PSY_A_CORE
Patient seen and evaluated. As per nursing report no acute events. On approach patient calm and cooperative. No agitation or aggression noted. States she slept better last night. Patient states she is feeling better. Continues to have periods of depression but they are decreasing. Cymbalta recently increased. Will continue to monitor. Denies any suicidal/homicidal ideations. Denies any A/V hallucinations. Patient appears optimistic about Rehab. CATCH team in place. Patient visible on the unit engaging with peers and attending groups.

## 2023-06-28 NOTE — BH SAFETY PLAN - THE ONE THING THAT IS MOST IMPORTANT TO ME AND WORTH LIVING FOR IS:
After Visit Summary   12/5/2018    Judith Nelson    MRN: [id number]           Patient Information     Date Of Birth          1962        Visit Information        Provider Department      12/5/2018 12:00 PM Norm Franco MD [facility]        Today's Diagnoses     Pulmonary hypertension (H)    -  1    Hip pain, left        Dyspnea on exertion          Care Instructions    Medication Changes:   No medication changes at this time. Please continue current medication regiment.    Patient Instructions:  1. Continue staying active and eat a heart healthy diet.    2. Please keep current list of medications with you at all times.    3. Remember to weigh yourself daily after voiding and before you consume any food or beverages and log the numbers.  If you have gained/lost 2 pounds overnight or 5 pounds in a week contact us immediately for medication adjustments or further instructions.    4. **Please call us immediately if you have any syncope (fainting or passing out), chest pain, edema (swelling or weight gain), or decline in your functional status (general decline in how you are feeling).      Check-In  Time Check-In Location Estimated Length Procedure   Name        [facility]  waiting room 60-90 minutes Right Heart Catheterization**     Procedure Preparations & Instructions     This is an invasive procedure that DOES require preparation:    - Nothing to eat for 6 hours   - You may have clear liquids up until the time of your procedure  - A ride should be arranged for you in the instance you are unable to drive home, however you should be able to function as you normally would after the procedure     *For Patients with Diabetes: ? DO NOT take any oral diabetic medication, short-acting diabetes medications/insulin, humalog or regular insulin the morning of your test  ? Take   dose of long-acting insulin (Lantus, Levemir) the day of your test  ? Remember to  bring your glucometer and  insulin with you to take after your test if needed     *For Patients on anticoagulants: ? Your Coumadin Clinic will give you instructions on medication adjustments or bridging prior to the procedure      Follow up Appointment Information:  Follow up after Right heart catheterization    Referral to Orthopedic Surgery Dr. Lai Wheeler - [phone]    We are located on the third floor of the [facility] ([facility] on the [facility].  Our address is     [address] [place] [address] on [address] [place] [address]      Thank you for allowing us to be a part of your care here at the [facility]    If you have questions or concerns please contact us at:    Lynne Tamayo RN, BSN    Reji Escobar (Schedule,Prior Auth)  Nurse Coordinator     Clinic   Pulmonary Hypertension   Pulmonary Hypertension  [facility]  [phone])[phone]    (P) [phone]        (F)[phone]                ** Please note that you will NOT receive a reminder call regarding your scheduled testing, reminder calls are for provider appointments only.  If you are scheduled for testing within the [employer] system you may receive a call regarding pre-registration for billing purposes only.**     Support Group:  Pulmonary Hypertension Association  [email or web address].phassociation.org/  **Look at the Events Tab** They even have Support Groups that you can call into    [facility] PH Support Group  Second Saturday of the Month from 1-3 PM   Location: [address] [place] [address]  Leader: Michelet Garcia  Phone: [phone]  Email: [email or web address]          Follow-ups after your visit        Additional Services     ORTHOPEDICS ADULT REFERRAL       Your provider has referred you to: [facility]: Orthopaedic Clinic - [place] [phone]   [email or web address]      Please be  aware that coverage of these services is subject to the terms and limitations of your health insurance plan.  Call member services at your health plan with any benefit or coverage questions.      Please bring the following to your appointment:    >>   Any x-rays, CTs or MRIs which have been performed.  Contact the facility where they were done to arrange for  prior to your scheduled appointment.    >>   List of current medications   >>   This referral request   >>   Any documents/labs given to you for this referral                  Your next 10 appointments already scheduled     Dec 27, 2018  1:15 PM CST   (Arrive by 1:00 PM)   NEW HIP with Lai Fernandez MD   [facility] ([facility])    [address] [place] [address]  4th [facility] [address] [phone]              Future tests that were ordered for you today     Open Future Orders        Priority Expected Expires Ordered    Case Request Cath Lab: Right Heart Cath Routine 12/5/2018 12/5/2019 12/5/2018    General PFT Lab (Please always keep checked) Routine  12/5/2019 12/5/2018    6 minute walk test Routine  12/5/2019 12/5/2018    Pulmonary Function Test Routine  12/5/2019 12/5/2018    Basic metabolic panel Routine 12/5/2018 12/5/2019 12/5/2018    CBC with platelets differential Routine 12/5/2018 12/5/2019 12/5/2018    Hepatic panel Routine 12/5/2018 12/5/2019 12/5/2018            Who to contact     If you have questions or need follow up information about today's clinic visit or your schedule please contact [facility] directly at [phone].  Normal or non-critical lab and imaging results will be communicated to you by MyChart, letter or phone within 4 business days after the clinic has received the results. If you do not hear from us within 7 days, please contact the clinic through MyChart or phone. If you have a critical or abnormal lab result, we will notify you by phone as soon as  "possible.  Submit refill requests through [employer] or call your pharmacy and they will forward the refill request to us. Please allow 3 business days for your refill to be completed.          Additional Information About Your Visit        [employer] Information     [employer] gives you secure access to your electronic health record. If you see a primary care provider, you can also send messages to your care team and make appointments. If you have questions, please call your primary care clinic.  If you do not have a primary care provider, please call [phone] and they will assist you.        Care EveryWhere ID     This is your Care EveryWhere ID. This could be used by other organizations to access your [place] medical records  [id number]        Your Vitals Were     Pulse Height Pulse Oximetry BMI (Body Mass Index)          65 1.6 m (5' 3\") 96% 20.64 kg/m2         Blood Pressure from Last 3 Encounters:   12/05/18 117/78   12/05/18 108/73   11/24/18 99/60    Weight from Last 3 Encounters:   12/05/18 52.8 kg (116 lb 8 oz)   12/05/18 52.8 kg (116 lb 8 oz)   11/07/18 53.5 kg (118 lb)              We Performed the Following     ORTHOPEDICS ADULT REFERRAL          Today's Medication Changes          These changes are accurate as of 12/5/18  2:26 PM.  If you have any questions, ask your nurse or doctor.               These medicines have changed or have updated prescriptions.        Dose/Directions    furosemide 40 MG tablet   Commonly known as:  LASIX   This may have changed:    - how much to take  - when to take this  - additional instructions   Used for:  Pulmonary hypertension (H), Stress-induced cardiomyopathy        Take 40 MG M-W-F, 20 MG all other days.   Quantity:  90 tablet   Refills:  3       * levalbuterol 45 MCG/ACT inhaler   Commonly known as:  XOPENEX HFA   This may have changed:  Another medication with the same name was added. Make sure you understand how and when to take each.   Used for:  Pulmonary " hypertension (H)   Changed by:  Gael Flaherty MD        Dose:  2 puff   Inhale 2 puffs into the lungs every 6 hours as needed for shortness of breath / dyspnea or wheezing (priro to exertion/exercise.)   Quantity:  1 Inhaler   Refills:  1       * levalbuterol 45 MCG/ACT inhaler   Commonly known as:  XOPENEX HFA   This may have changed:  You were already taking a medication with the same name, and this prescription was added. Make sure you understand how and when to take each.   Used for:  Sarcoidosis   Changed by:  Gael Flaherty MD        Dose:  2 puff   Inhale 2 puffs into the lungs every 6 hours as needed for shortness of breath / dyspnea or wheezing   Quantity:  1 Inhaler   Refills:  11       * Notice:  This list has 2 medication(s) that are the same as other medications prescribed for you. Read the directions carefully, and ask your doctor or other care provider to review them with you.      Stop taking these medicines if you haven't already. Please contact your care team if you have questions.     morphine 15 MG IR tablet   Commonly known as:  MSIR   Stopped by:  Gael Flaherty MD                Where to get your medicines      These medications were sent to [address] [place] [address]    Hours:  TRANSPLANT PHONE NUMBER [phone] Phone:  [phone]     levalbuterol 45 MCG/ACT inhaler                Primary Care Provider Office Phone # Fax #    Anupama Babcock [phone]       [address] [address]        Equal Access to Services     [facility] AH: Hadii aad ku hadasho Soomaali, waaxda luqadaha, qaybta kaalmada adeegyada, alysha abdi. So [facility] [phone].    ATENCIÓN: Si habla español, tiene a dhillon disposición servicios gratuitos de asistencia lingüística. Llame al [phone].    We comply with  applicable federal civil rights laws and [place] laws. We do not discriminate on the basis of race, color, national origin, age, disability, sex, sexual orientation, or gender identity.            Thank you!     Thank you for choosing [facility]  for your care. Our goal is always to provide you with excellent care. Hearing back from our patients is one way we can continue to improve our services. Please take a few minutes to complete the written survey that you may receive in the mail after your visit with us. Thank you!             Your Updated Medication List - Protect others around you: Learn how to safely use, store and throw away your medicines at www.disposemymeds.org.          This list is accurate as of 12/5/18  2:26 PM.  Always use your most recent med list.                   Brand Name Dispense Instructions for use Diagnosis    acetaminophen 500 MG tablet    TYLENOL    45 tablet    Take 1-2 tablets (500-1,000 mg) by mouth every 6 hours as needed for pain (Take as needed for pain.  Do not exceed 4 grams (8 tablets) in a day)    Pulmonary hypertension (H)       azaTHIOprine 50 MG tablet    IMURAN    90 tablet    Take 1 tablet (50 mg) by mouth daily    Sarcoidosis       buPROPion 200 MG 12 hr tablet    WELLBUTRIN SR    30 tablet    Take 1 tablet (200 mg) by mouth every morning    Major depressive disorder, single episode, moderate (H)       BUSPIRONE HCL PO      Take 15 mg by mouth 2 times daily        calcitRIOL 0.25 MCG capsule    ROCALTROL    30 capsule    Take 1 capsule (0.25 mcg) by mouth daily        carvedilol 12.5 MG tablet    COREG    135 tablet    Take 0.5 tablets (6.25 mg) by mouth 2 times daily (with meals)    Chronic systolic heart failure (H)       denosumab 60 MG/ML Soln injection    PROLIA    1 mL    Inject 1 mL (60 mg) Subcutaneous every 6 months    Osteoporosis, postmenopausal       furosemide 40 MG tablet    LASIX    90 tablet    Take 40 MG M-W-F, 20 MG all other days.     Pulmonary hypertension (H), Stress-induced cardiomyopathy       hydrOXYzine 25 MG capsule    VISTARIL     1 cap(s)        * levalbuterol 45 MCG/ACT inhaler    XOPENEX HFA    1 Inhaler    Inhale 2 puffs into the lungs every 6 hours as needed for shortness of breath / dyspnea or wheezing (priro to exertion/exercise.)    Pulmonary hypertension (H)       * levalbuterol 45 MCG/ACT inhaler    XOPENEX HFA    1 Inhaler    Inhale 2 puffs into the lungs every 6 hours as needed for shortness of breath / dyspnea or wheezing    Sarcoidosis       LORazepam 0.5 MG tablet    ATIVAN    90 tablet    Take 0.5 mg (1 tab) every morning and 1 mg (2 tabs) every night at bedtime.    Major depressive disorder, single episode, severe with psychotic features (H)       macitentan 10 MG tablet    OPSUMIT    30 tablet    Take 1 tablet (10 mg) by mouth daily    Primary pulmonary hypertension (H)       order for DME     1 Device    Equipment being ordered: SHOWER CHAIR  FROM [facility]    Tremor, Generalized muscle weakness, Sarcoidosis       OXYCODONE HCL PO      Take by mouth every 6 hours as needed    Sarcoidosis       potassium chloride ER 10 MEQ CR tablet    K-TAB/KLOR-CON    90 tablet    Take 1 tablet (10 mEq) by mouth daily    Heart failure (H)       ranitidine 75 MG tablet    ranitidine    60 tablet    Take 1-2 tablets ( mg) by mouth 2 times daily    Primary pulmonary hypertension (H), Anemia, unspecified type, SOB (shortness of breath)       selexipag 1600 MCG tablet    UPTRAVI     Take 1 tablet (1,600 mcg) by mouth every 12 hours    Pulmonary hypertension (H)       simvastatin 10 MG tablet    ZOCOR    90 tablet    Take 1 tablet by mouth At Bedtime.    Hypercholesterolemia       SODIUM BICARBONATE PO      Take by mouth 2 times daily    Pulmonary arterial hypertension (H)       tadalafil (PAH) 20 MG Tabs    ADCIRCA    60 tablet    Take 2 tablets (40 mg) by mouth daily    Pulmonary hypertension (H)       TEMAZEPAM PO      Take by  mouth At Bedtime        * Notice:  This list has 2 medication(s) that are the same as other medications prescribed for you. Read the directions carefully, and ask your doctor or other care provider to review them with you.       The one thing that is most important to me and worth living for would be my family, friends, and security.

## 2023-06-28 NOTE — BH SAFETY PLAN - ENVIRONMENT SAFETY 2:
Another way I would make my environment safe would be to stay away from certain people, places and things.

## 2023-06-28 NOTE — BH INPATIENT PSYCHIATRY PROGRESS NOTE - CURRENT MEDICATION
MEDICATIONS  (STANDING):  amLODIPine   Tablet 10 milliGRAM(s) Oral daily  clonazePAM  Tablet 1 milliGRAM(s) Oral two times a day  DULoxetine 60 milliGRAM(s) Oral daily  hydrALAZINE 25 milliGRAM(s) Oral two times a day  prazosin. 2 milliGRAM(s) Oral at bedtime  propranolol 10 milliGRAM(s) Oral two times a day  senna 2 Tablet(s) Oral at bedtime  traZODone 150 milliGRAM(s) Oral at bedtime    MEDICATIONS  (PRN):  acetaminophen     Tablet .. 650 milliGRAM(s) Oral every 6 hours PRN Temp greater or equal to 38C (100.4F), Mild Pain (1 - 3)  haloperidol     Tablet 5 milliGRAM(s) Oral every 6 hours PRN Agitation  ibuprofen  Tablet. 400 milliGRAM(s) Oral every 6 hours PRN Moderate Pain (4 - 6)  melatonin. 5 milliGRAM(s) Oral at bedtime PRN Insomnia  polyethylene glycol 3350 17 Gram(s) Oral daily PRN Constipation

## 2023-06-28 NOTE — BH INPATIENT PSYCHIATRY PROGRESS NOTE - NSBHMETABOLIC_PSY_ALL_CORE_FT
BMI: BMI (kg/m2): 27.9 (06-25-23 @ 01:28)  HbA1c: A1C with Estimated Average Glucose Result: 5.7 % (06-25-23 @ 08:40)    Glucose:   BP: 106/52 (06-28-23 @ 08:29) (99/69 - 161/85)  Lipid Panel: Date/Time: 06-25-23 @ 08:40  Cholesterol, Serum: 157  Direct LDL: --  HDL Cholesterol, Serum: 54  Total Cholesterol/HDL Ration Measurement: --  Triglycerides, Serum: 135

## 2023-06-28 NOTE — BH INPATIENT PSYCHIATRY PROGRESS NOTE - NSBHCHARTREVIEWVS_PSY_A_CORE FT
Vital Signs Last 24 Hrs  T(C): 35.7 (06-28-23 @ 05:40), Max: 35.7 (06-28-23 @ 05:40)  T(F): 96.2 (06-28-23 @ 05:40), Max: 96.2 (06-28-23 @ 05:40)  HR: 109 (06-28-23 @ 08:29) (99 - 109)  BP: 106/52 (06-28-23 @ 08:29) (106/52 - 161/85)  BP(mean): --  RR: 16 (06-28-23 @ 08:29) (16 - 18)  SpO2: --

## 2023-06-29 PROCEDURE — 99231 SBSQ HOSP IP/OBS SF/LOW 25: CPT

## 2023-06-29 RX ORDER — CLONAZEPAM 1 MG
1 TABLET ORAL AT BEDTIME
Refills: 0 | Status: DISCONTINUED | OUTPATIENT
Start: 2023-06-29 | End: 2023-07-05

## 2023-06-29 RX ORDER — CLONAZEPAM 1 MG
0.5 TABLET ORAL DAILY
Refills: 0 | Status: DISCONTINUED | OUTPATIENT
Start: 2023-06-30 | End: 2023-07-05

## 2023-06-29 RX ADMIN — SENNA PLUS 2 TABLET(S): 8.6 TABLET ORAL at 20:03

## 2023-06-29 RX ADMIN — Medication 1 MILLIGRAM(S): at 20:03

## 2023-06-29 RX ADMIN — Medication 400 MILLIGRAM(S): at 20:55

## 2023-06-29 RX ADMIN — Medication 25 MILLIGRAM(S): at 08:44

## 2023-06-29 RX ADMIN — DULOXETINE HYDROCHLORIDE 60 MILLIGRAM(S): 30 CAPSULE, DELAYED RELEASE ORAL at 08:44

## 2023-06-29 RX ADMIN — Medication 25 MILLIGRAM(S): at 20:03

## 2023-06-29 RX ADMIN — Medication 400 MILLIGRAM(S): at 02:30

## 2023-06-29 RX ADMIN — Medication 1 MILLIGRAM(S): at 08:44

## 2023-06-29 RX ADMIN — Medication 400 MILLIGRAM(S): at 10:51

## 2023-06-29 RX ADMIN — Medication 2 MILLIGRAM(S): at 20:03

## 2023-06-29 RX ADMIN — Medication 400 MILLIGRAM(S): at 02:00

## 2023-06-29 RX ADMIN — Medication 150 MILLIGRAM(S): at 20:04

## 2023-06-29 RX ADMIN — AMLODIPINE BESYLATE 10 MILLIGRAM(S): 2.5 TABLET ORAL at 08:44

## 2023-06-29 RX ADMIN — Medication 25 MILLIGRAM(S): at 20:58

## 2023-06-29 RX ADMIN — Medication 400 MILLIGRAM(S): at 21:25

## 2023-06-29 RX ADMIN — Medication 400 MILLIGRAM(S): at 16:29

## 2023-06-29 NOTE — BH INPATIENT PSYCHIATRY PROGRESS NOTE - NSBHFUPINTERVALHXFT_PSY_A_CORE
Patient seen and evaluated. As per nursing report no acute events. On approach patient calm and cooperative. No agitation or aggression noted. States she slept well last night. Woke up because of a tooth ache. States she has been sleeping better since admission. Patient states she is feeling better. Continues to have periods of depression but they are decreasing. Writer discussed titrating Cymbalta accordingly. Patient verbalized understanding and states her symptoms are decrease so would like to keep the medication as is at this time. Will continue to monitor. Denies any suicidal/homicidal ideations. Denies any A/V hallucinations. Patient appears optimistic about Rehab. CATCH team in place. Patient visible on the unit engaging with peers and attending groups. Patient seen and evaluated. As per nursing report no acute events. On approach patient calm and cooperative. No agitation or aggression noted. States she slept well last night. Woke up because of a tooth ache. States she has been sleeping better since admission. Patient states she is feeling better. Continues to have periods of depression but they are decreasing. Writer discussed titrating Cymbalta accordingly. Patient verbalized understanding and states her symptoms are decrease so would like to keep the medication as is at this time. Will continue to monitor. Denies any suicidal/homicidal ideations. Denies any A/V hallucinations. Patient appears enthusiastic and optimistic about Rehab. Agrees to start tapering off Klonopin. CATCH team in place. Patient visible on the unit engaging with peers and attending groups.

## 2023-06-29 NOTE — BH INPATIENT PSYCHIATRY PROGRESS NOTE - CURRENT MEDICATION
MEDICATIONS  (STANDING):  amLODIPine   Tablet 10 milliGRAM(s) Oral daily  clonazePAM  Tablet 1 milliGRAM(s) Oral two times a day  DULoxetine 60 milliGRAM(s) Oral daily  hydrALAZINE 25 milliGRAM(s) Oral two times a day  prazosin. 2 milliGRAM(s) Oral at bedtime  propranolol 10 milliGRAM(s) Oral two times a day  senna 2 Tablet(s) Oral at bedtime  traZODone 150 milliGRAM(s) Oral at bedtime    MEDICATIONS  (PRN):  acetaminophen     Tablet .. 650 milliGRAM(s) Oral every 6 hours PRN Temp greater or equal to 38C (100.4F), Mild Pain (1 - 3)  diphenhydrAMINE 25 milliGRAM(s) Oral every 4 hours PRN Rash and/or Itching  haloperidol     Tablet 5 milliGRAM(s) Oral every 6 hours PRN Agitation  ibuprofen  Tablet. 400 milliGRAM(s) Oral every 6 hours PRN Moderate Pain (4 - 6)  melatonin. 5 milliGRAM(s) Oral at bedtime PRN Insomnia  polyethylene glycol 3350 17 Gram(s) Oral daily PRN Constipation   MEDICATIONS  (STANDING):  amLODIPine   Tablet 10 milliGRAM(s) Oral daily  clonazePAM  Tablet 1 milliGRAM(s) Oral at bedtime  DULoxetine 60 milliGRAM(s) Oral daily  hydrALAZINE 25 milliGRAM(s) Oral two times a day  prazosin. 2 milliGRAM(s) Oral at bedtime  propranolol 10 milliGRAM(s) Oral two times a day  senna 2 Tablet(s) Oral at bedtime  traZODone 150 milliGRAM(s) Oral at bedtime    MEDICATIONS  (PRN):  acetaminophen     Tablet .. 650 milliGRAM(s) Oral every 6 hours PRN Temp greater or equal to 38C (100.4F), Mild Pain (1 - 3)  diphenhydrAMINE 25 milliGRAM(s) Oral every 4 hours PRN Rash and/or Itching  haloperidol     Tablet 5 milliGRAM(s) Oral every 6 hours PRN Agitation  ibuprofen  Tablet. 400 milliGRAM(s) Oral every 6 hours PRN Moderate Pain (4 - 6)  melatonin. 5 milliGRAM(s) Oral at bedtime PRN Insomnia  polyethylene glycol 3350 17 Gram(s) Oral daily PRN Constipation   MEDICATIONS  (STANDING):  amLODIPine   Tablet 10 milliGRAM(s) Oral daily  clonazePAM  Tablet 1 milliGRAM(s) Oral at bedtime  clonazePAM  Tablet 0.5 milliGRAM(s) Oral daily  DULoxetine 60 milliGRAM(s) Oral daily  hydrALAZINE 25 milliGRAM(s) Oral two times a day  prazosin. 2 milliGRAM(s) Oral at bedtime  propranolol 10 milliGRAM(s) Oral two times a day  senna 2 Tablet(s) Oral at bedtime  traZODone 150 milliGRAM(s) Oral at bedtime    MEDICATIONS  (PRN):  acetaminophen     Tablet .. 650 milliGRAM(s) Oral every 6 hours PRN Temp greater or equal to 38C (100.4F), Mild Pain (1 - 3)  diphenhydrAMINE 25 milliGRAM(s) Oral every 4 hours PRN Rash and/or Itching  haloperidol     Tablet 5 milliGRAM(s) Oral every 6 hours PRN Agitation  ibuprofen  Tablet. 400 milliGRAM(s) Oral every 6 hours PRN Moderate Pain (4 - 6)  melatonin. 5 milliGRAM(s) Oral at bedtime PRN Insomnia  polyethylene glycol 3350 17 Gram(s) Oral daily PRN Constipation
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
## 2023-06-29 NOTE — BH INPATIENT PSYCHIATRY PROGRESS NOTE - NSBHCHARTREVIEWVS_PSY_A_CORE FT
Vital Signs Last 24 Hrs  T(C): 36.6 (06-29-23 @ 09:05), Max: 36.6 (06-29-23 @ 09:05)  T(F): 97.8 (06-29-23 @ 09:05), Max: 97.8 (06-29-23 @ 09:05)  HR: 105 (06-29-23 @ 09:05) (105 - 113)  BP: 128/63 (06-29-23 @ 09:05) (128/63 - 147/83)  BP(mean): --  RR: 16 (06-29-23 @ 09:05) (16 - 16)  SpO2: --     Vital Signs Last 24 Hrs  T(C): 35.8 (06-29-23 @ 16:06), Max: 35.8 (06-29-23 @ 16:06)  T(F): 96.5 (06-29-23 @ 16:06), Max: 96.5 (06-29-23 @ 16:06)  HR: 99 (06-30-23 @ 07:37) (99 - 107)  BP: 102/59 (06-30-23 @ 07:37) (102/59 - 137/77)  BP(mean): --  RR: 18 (06-30-23 @ 07:37) (18 - 18)  SpO2: --

## 2023-06-29 NOTE — BH INPATIENT PSYCHIATRY PROGRESS NOTE - NSBHMETABOLIC_PSY_ALL_CORE_FT
BMI: BMI (kg/m2): 27.9 (06-25-23 @ 01:28)  HbA1c: A1C with Estimated Average Glucose Result: 5.7 % (06-25-23 @ 08:40)    Glucose:   BP: 128/63 (06-29-23 @ 09:05) (106/52 - 161/85)  Lipid Panel: Date/Time: 06-25-23 @ 08:40  Cholesterol, Serum: 157  Direct LDL: --  HDL Cholesterol, Serum: 54  Total Cholesterol/HDL Ration Measurement: --  Triglycerides, Serum: 135   BMI: BMI (kg/m2): 27.9 (06-25-23 @ 01:28)  HbA1c: A1C with Estimated Average Glucose Result: 5.7 % (06-25-23 @ 08:40)    Glucose:   BP: 102/59 (06-30-23 @ 07:37) (102/59 - 161/85)  Lipid Panel: Date/Time: 06-25-23 @ 08:40  Cholesterol, Serum: 157  Direct LDL: --  HDL Cholesterol, Serum: 54  Total Cholesterol/HDL Ration Measurement: --  Triglycerides, Serum: 135

## 2023-06-30 PROCEDURE — 99231 SBSQ HOSP IP/OBS SF/LOW 25: CPT

## 2023-06-30 RX ORDER — LIDOCAINE 4 G/100G
10 CREAM TOPICAL EVERY 6 HOURS
Refills: 0 | Status: DISCONTINUED | OUTPATIENT
Start: 2023-06-30 | End: 2023-07-05

## 2023-06-30 RX ADMIN — SENNA PLUS 2 TABLET(S): 8.6 TABLET ORAL at 19:58

## 2023-06-30 RX ADMIN — DULOXETINE HYDROCHLORIDE 60 MILLIGRAM(S): 30 CAPSULE, DELAYED RELEASE ORAL at 08:06

## 2023-06-30 RX ADMIN — Medication 0.5 MILLIGRAM(S): at 08:05

## 2023-06-30 RX ADMIN — Medication 400 MILLIGRAM(S): at 12:38

## 2023-06-30 RX ADMIN — Medication 400 MILLIGRAM(S): at 19:13

## 2023-06-30 RX ADMIN — Medication 150 MILLIGRAM(S): at 19:59

## 2023-06-30 RX ADMIN — Medication 2 MILLIGRAM(S): at 19:58

## 2023-06-30 RX ADMIN — Medication 400 MILLIGRAM(S): at 12:45

## 2023-06-30 RX ADMIN — Medication 400 MILLIGRAM(S): at 03:55

## 2023-06-30 RX ADMIN — Medication 400 MILLIGRAM(S): at 20:11

## 2023-06-30 RX ADMIN — Medication 1 MILLIGRAM(S): at 20:00

## 2023-06-30 RX ADMIN — Medication 25 MILLIGRAM(S): at 19:58

## 2023-06-30 NOTE — BH INPATIENT PSYCHIATRY PROGRESS NOTE - NSBHFUPINTERVALHXFT_PSY_A_CORE
Patient seen and evaluated. As per nursing report no acute events. On approach patient calm and cooperative. No agitation or aggression noted. Verbalizes sleeping better since admission. Patient states she is feeling better. Expresses a decrease in depression and an overall improved mood. Denies any suicidal/homicidal ideations. Denies any A/V hallucinations. Patient continues to appear to be enthusiastic and optimistic about Rehab. Verbalizes wanting to stay clean. Agreed to start tapering off Klonopin. Decrease in Klonopin starts today. CATCH team in place. Has phone screening today. Patient visible on the unit engaging with peers and attending groups. Awaiting rehab placement. Patient seen and evaluated. As per nursing report no acute events. On approach patient calm and cooperative. No agitation or aggression noted. Verbalizes sleeping better since admission. Patient states she is feeling better. Expresses a decrease in depression and an overall improved mood. Denies any suicidal/homicidal ideations. Denies any A/V hallucinations. Patient continues to appear to be enthusiastic and optimistic about Rehab. Verbalizes wanting to stay clean. Agreed to start tapering off Klonopin. Decrease in Klonopin starts today. CATCH team in place. Has phone screening today. Patient visible on the unit engaging with peers and attending groups. Awaiting rehab placement.    Patient has complaints of tooth pain. Writer assessed patient. Asked if she needed to see a PA. Patient declined states Motrin is fine. Patient made aware she can alternate between Motrin and Tylenol. Advised to let the nurse know if pain increases. Writer spoke to RN who is aware and will continue to monitor.

## 2023-06-30 NOTE — BH INPATIENT PSYCHIATRY PROGRESS NOTE - NSBHCHARTREVIEWVS_PSY_A_CORE FT
Vital Signs Last 24 Hrs  T(C): 35.8 (06-29-23 @ 16:06), Max: 35.8 (06-29-23 @ 16:06)  T(F): 96.5 (06-29-23 @ 16:06), Max: 96.5 (06-29-23 @ 16:06)  HR: 99 (06-30-23 @ 07:37) (99 - 107)  BP: 102/59 (06-30-23 @ 07:37) (102/59 - 137/77)  BP(mean): --  RR: 18 (06-30-23 @ 07:37) (18 - 18)  SpO2: --     Vital Signs Last 24 Hrs  T(C): --  T(F): --  HR: 99 (06-30-23 @ 07:37) (99 - 107)  BP: 102/59 (06-30-23 @ 07:37) (102/59 - 137/77)  BP(mean): --  RR: 18 (06-30-23 @ 07:37) (18 - 18)  SpO2: --

## 2023-06-30 NOTE — BH INPATIENT PSYCHIATRY PROGRESS NOTE - CURRENT MEDICATION
MEDICATIONS  (STANDING):  amLODIPine   Tablet 10 milliGRAM(s) Oral daily  clonazePAM  Tablet 1 milliGRAM(s) Oral at bedtime  clonazePAM  Tablet 0.5 milliGRAM(s) Oral daily  DULoxetine 60 milliGRAM(s) Oral daily  hydrALAZINE 25 milliGRAM(s) Oral two times a day  prazosin. 2 milliGRAM(s) Oral at bedtime  propranolol 10 milliGRAM(s) Oral two times a day  senna 2 Tablet(s) Oral at bedtime  traZODone 150 milliGRAM(s) Oral at bedtime    MEDICATIONS  (PRN):  acetaminophen     Tablet .. 650 milliGRAM(s) Oral every 6 hours PRN Temp greater or equal to 38C (100.4F), Mild Pain (1 - 3)  diphenhydrAMINE 25 milliGRAM(s) Oral every 4 hours PRN Rash and/or Itching  haloperidol     Tablet 5 milliGRAM(s) Oral every 6 hours PRN Agitation  ibuprofen  Tablet. 400 milliGRAM(s) Oral every 6 hours PRN Moderate Pain (4 - 6)  melatonin. 5 milliGRAM(s) Oral at bedtime PRN Insomnia  polyethylene glycol 3350 17 Gram(s) Oral daily PRN Constipation

## 2023-06-30 NOTE — BH INPATIENT PSYCHIATRY PROGRESS NOTE - NSBHMETABOLIC_PSY_ALL_CORE_FT
BMI: BMI (kg/m2): 27.9 (06-25-23 @ 01:28)  HbA1c: A1C with Estimated Average Glucose Result: 5.7 % (06-25-23 @ 08:40)    Glucose:   BP: 102/59 (06-30-23 @ 07:37) (102/59 - 161/85)  Lipid Panel: Date/Time: 06-25-23 @ 08:40  Cholesterol, Serum: 157  Direct LDL: --  HDL Cholesterol, Serum: 54  Total Cholesterol/HDL Ration Measurement: --  Triglycerides, Serum: 135

## 2023-07-01 RX ADMIN — DULOXETINE HYDROCHLORIDE 60 MILLIGRAM(S): 30 CAPSULE, DELAYED RELEASE ORAL at 08:10

## 2023-07-01 RX ADMIN — Medication 1 MILLIGRAM(S): at 20:13

## 2023-07-01 RX ADMIN — Medication 25 MILLIGRAM(S): at 20:13

## 2023-07-01 RX ADMIN — Medication 150 MILLIGRAM(S): at 20:13

## 2023-07-01 RX ADMIN — SENNA PLUS 2 TABLET(S): 8.6 TABLET ORAL at 20:13

## 2023-07-01 RX ADMIN — LIDOCAINE 10 MILLILITER(S): 4 CREAM TOPICAL at 13:17

## 2023-07-01 RX ADMIN — AMLODIPINE BESYLATE 10 MILLIGRAM(S): 2.5 TABLET ORAL at 08:08

## 2023-07-01 RX ADMIN — Medication 2 MILLIGRAM(S): at 20:14

## 2023-07-01 RX ADMIN — Medication 25 MILLIGRAM(S): at 08:09

## 2023-07-01 RX ADMIN — Medication 25 MILLIGRAM(S): at 20:18

## 2023-07-01 RX ADMIN — Medication 0.5 MILLIGRAM(S): at 08:11

## 2023-07-02 RX ADMIN — POLYETHYLENE GLYCOL 3350 17 GRAM(S): 17 POWDER, FOR SOLUTION ORAL at 11:28

## 2023-07-02 RX ADMIN — AMLODIPINE BESYLATE 10 MILLIGRAM(S): 2.5 TABLET ORAL at 08:16

## 2023-07-02 RX ADMIN — Medication 25 MILLIGRAM(S): at 20:59

## 2023-07-02 RX ADMIN — SENNA PLUS 2 TABLET(S): 8.6 TABLET ORAL at 20:08

## 2023-07-02 RX ADMIN — Medication 1 MILLIGRAM(S): at 20:08

## 2023-07-02 RX ADMIN — Medication 2 MILLIGRAM(S): at 20:08

## 2023-07-02 RX ADMIN — Medication 0.5 MILLIGRAM(S): at 08:15

## 2023-07-02 RX ADMIN — Medication 25 MILLIGRAM(S): at 08:15

## 2023-07-02 RX ADMIN — Medication 25 MILLIGRAM(S): at 20:08

## 2023-07-02 RX ADMIN — Medication 150 MILLIGRAM(S): at 20:08

## 2023-07-02 RX ADMIN — DULOXETINE HYDROCHLORIDE 60 MILLIGRAM(S): 30 CAPSULE, DELAYED RELEASE ORAL at 08:15

## 2023-07-03 PROCEDURE — 99231 SBSQ HOSP IP/OBS SF/LOW 25: CPT

## 2023-07-03 RX ORDER — LIDOCAINE 4 G/100G
10 CREAM TOPICAL
Qty: 0 | Refills: 0 | DISCHARGE
Start: 2023-07-03

## 2023-07-03 RX ORDER — DULOXETINE HYDROCHLORIDE 30 MG/1
1 CAPSULE, DELAYED RELEASE ORAL
Qty: 0 | Refills: 0 | DISCHARGE
Start: 2023-07-03

## 2023-07-03 RX ORDER — ACETAMINOPHEN 500 MG
2 TABLET ORAL
Qty: 0 | Refills: 0 | DISCHARGE
Start: 2023-07-03

## 2023-07-03 RX ORDER — OMEPRAZOLE 10 MG/1
1 CAPSULE, DELAYED RELEASE ORAL
Qty: 0 | Refills: 0 | DISCHARGE

## 2023-07-03 RX ORDER — TRAZODONE HCL 50 MG
1 TABLET ORAL
Qty: 0 | Refills: 0 | DISCHARGE
Start: 2023-07-03

## 2023-07-03 RX ORDER — METOPROLOL TARTRATE 50 MG
1 TABLET ORAL
Qty: 0 | Refills: 0 | DISCHARGE

## 2023-07-03 RX ORDER — LOSARTAN POTASSIUM 100 MG/1
1 TABLET, FILM COATED ORAL
Qty: 0 | Refills: 0 | DISCHARGE

## 2023-07-03 RX ORDER — AMLODIPINE BESYLATE 2.5 MG/1
1 TABLET ORAL
Qty: 0 | Refills: 0 | DISCHARGE
Start: 2023-07-03

## 2023-07-03 RX ORDER — CLONAZEPAM 1 MG
1 TABLET ORAL
Qty: 0 | Refills: 0 | DISCHARGE
Start: 2023-07-03

## 2023-07-03 RX ORDER — PROPRANOLOL HCL 160 MG
1 CAPSULE, EXTENDED RELEASE 24HR ORAL
Qty: 0 | Refills: 0 | DISCHARGE
Start: 2023-07-03

## 2023-07-03 RX ORDER — SENNA PLUS 8.6 MG/1
2 TABLET ORAL
Qty: 0 | Refills: 0 | DISCHARGE
Start: 2023-07-03

## 2023-07-03 RX ORDER — LANOLIN ALCOHOL/MO/W.PET/CERES
1 CREAM (GRAM) TOPICAL
Qty: 0 | Refills: 0 | DISCHARGE
Start: 2023-07-03

## 2023-07-03 RX ORDER — POLYETHYLENE GLYCOL 3350 17 G/17G
17 POWDER, FOR SOLUTION ORAL
Qty: 0 | Refills: 0 | DISCHARGE
Start: 2023-07-03

## 2023-07-03 RX ORDER — HYDRALAZINE HCL 50 MG
1 TABLET ORAL
Qty: 0 | Refills: 0 | DISCHARGE
Start: 2023-07-03

## 2023-07-03 RX ORDER — IBUPROFEN 200 MG
1 TABLET ORAL
Qty: 0 | Refills: 0 | DISCHARGE
Start: 2023-07-03

## 2023-07-03 RX ORDER — HYDROCHLOROTHIAZIDE 25 MG
1 TABLET ORAL
Qty: 0 | Refills: 0 | DISCHARGE

## 2023-07-03 RX ORDER — SIMVASTATIN 20 MG/1
1 TABLET, FILM COATED ORAL
Qty: 0 | Refills: 0 | DISCHARGE

## 2023-07-03 RX ORDER — PRAZOSIN HCL 2 MG
1 CAPSULE ORAL
Qty: 0 | Refills: 0 | DISCHARGE
Start: 2023-07-03

## 2023-07-03 RX ADMIN — DULOXETINE HYDROCHLORIDE 60 MILLIGRAM(S): 30 CAPSULE, DELAYED RELEASE ORAL at 08:16

## 2023-07-03 RX ADMIN — Medication 2 MILLIGRAM(S): at 20:12

## 2023-07-03 RX ADMIN — POLYETHYLENE GLYCOL 3350 17 GRAM(S): 17 POWDER, FOR SOLUTION ORAL at 09:53

## 2023-07-03 RX ADMIN — Medication 0.5 MILLIGRAM(S): at 08:16

## 2023-07-03 RX ADMIN — Medication 150 MILLIGRAM(S): at 20:13

## 2023-07-03 RX ADMIN — Medication 1 MILLIGRAM(S): at 20:12

## 2023-07-03 RX ADMIN — AMLODIPINE BESYLATE 10 MILLIGRAM(S): 2.5 TABLET ORAL at 08:17

## 2023-07-03 RX ADMIN — Medication 25 MILLIGRAM(S): at 08:16

## 2023-07-03 RX ADMIN — Medication 25 MILLIGRAM(S): at 20:12

## 2023-07-03 RX ADMIN — Medication 25 MILLIGRAM(S): at 21:00

## 2023-07-03 RX ADMIN — SENNA PLUS 2 TABLET(S): 8.6 TABLET ORAL at 20:13
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
## 2023-07-03 NOTE — BH INPATIENT PSYCHIATRY DISCHARGE NOTE - NSBHMETABOLIC_PSY_ALL_CORE_FT
BMI: BMI (kg/m2): 27.9 (06-25-23 @ 01:28)  HbA1c: A1C with Estimated Average Glucose Result: 5.7 % (06-25-23 @ 08:40)    Glucose:   BP: 123/65 (07-03-23 @ 16:05) (117/72 - 156/74)  Lipid Panel: Date/Time: 06-25-23 @ 08:40  Cholesterol, Serum: 157  Direct LDL: --  HDL Cholesterol, Serum: 54  Total Cholesterol/HDL Ration Measurement: --  Triglycerides, Serum: 135

## 2023-07-03 NOTE — BH TREATMENT PLAN - NSTXDEPRESINTERSW_PSY_ALL_CORE
SW will encourage patient to attend unit groups to learn new coping skills.  SW will meet with patient daily for education and support
SW will encourage patient to attend unit groups to learn new coping skills.  SW will meet with patient daily for education and support

## 2023-07-03 NOTE — BH INPATIENT PSYCHIATRY DISCHARGE NOTE - NSBHFUPINTERVALHXFT_PSY_A_CORE
Patient seen and evaluated. As per nursing report PRN for sleep with good effect. On approach patient calm and cooperative. No agitation or aggression noted. Patient verbalizes continuing to feel better since admission. States she is sleeping betters. Verbalizes an overall improved mood. Per RN complaints of constipation. Patient prescribed senna standing. Patient has miralax PRN also ordered which patient has not been taken. RN will advise patient to take miralax and continue to monitor. Denies any suicidal/homicidal ideations. Able to contract for safety. Denies any A/V hallucinations. No evidence of psychosis noted. Patient continues to appear to be enthusiastic and optimistic about Rehab. Agreed to start tapering off Klonopin. Decrease in Klonopin started Friday. Patient states she has been doing well with the decreased dose. CATCH team in place. Patient visible on the unit engaging with peers and attending groups. Awaiting rehab placement.     Patient accepted to Rehab. Anticipated discharge Wednesday 7/5/23. Does not warrant continued Inpatient hospitalization. Patient does not present an imminent risk to self or others at this time. Rehab to continue Klonopin Taper. Patient seen and evaluated. As per nursing report PRN for sleep with good effect. On approach patient calm and cooperative. No agitation or aggression noted. Patient verbalizes continuing to feel better since admission. States she is sleeping betters. Verbalizes an overall improved mood. Per RN complaints of constipation. Patient prescribed senna standing. Patient has miralax PRN also ordered which patient has not been taken. RN will advise patient to take miralax and continue to monitor. Denies any suicidal/homicidal ideations. Able to contract for safety. Discussed coping skills. Denies any A/V hallucinations. No evidence of psychosis noted. Patient continues to appear to be enthusiastic and optimistic about Rehab. Agreed to start tapering off Klonopin. Decrease in Klonopin started Friday. Patient states she has been doing well with the decreased dose. CATCH team in place. Patient visible on the unit engaging with peers and attending groups. Awaiting rehab placement.     Patient accepted to Rehab. Anticipated discharge Wednesday 7/5/23. Does not warrant continued Inpatient hospitalization. Patient does not present an imminent risk to self or others at this time. Rehab to continue Klonopin Taper.

## 2023-07-03 NOTE — BH DISCHARGE NOTE NURSING/SOCIAL WORK/PSYCH REHAB - PATIENT PORTAL LINK FT
You can access the FollowMyHealth Patient Portal offered by [facility] by registering at the following website: [email or web address]. By joining [email or web address]’s FollowMyHealth portal, you will also be able to view your health information using other applications (apps) compatible with our system.

## 2023-07-03 NOTE — BH TREATMENT PLAN - NSTXSUICIDGOAL_PSY_ALL_CORE
Will express feelings associated with suicidal ideation
Talk to staff and ask for assistance when having suicidal wishes instead of acting out
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
## 2023-07-03 NOTE — BH TREATMENT PLAN - NSTXDEPRESPROGRES_PSY_ALL_CORE
Can you please dose this patients coumadin and let the patient know?    Thanks    Addy Frias M.D.  
Infusion Nursing Note:  Amira Arreola presents today for C8D1 daratumumab/velcade.    Patient seen by provider today: No   present during visit today: Not Applicable.    Note: patient states no new concerns at this time. Patient is taking the dexamethasone and acyclovir as directed.    Intravenous Access:  Labs drawn without difficulty.  Implanted Port.    Treatment Conditions:  Lab Results   Component Value Date    HGB 12.7 12/13/2017     Lab Results   Component Value Date    WBC 6.5 12/13/2017      Lab Results   Component Value Date    ANEU 5.9 12/13/2017     Lab Results   Component Value Date     12/13/2017                   Lab Results   Component Value Date    BILITOTAL 0.5 12/13/2017           Lab Results   Component Value Date    ALBUMIN 3.5 12/13/2017                    Lab Results   Component Value Date    ALT 29 12/13/2017           Lab Results   Component Value Date    AST 28 12/13/2017     Results reviewed, labs MET treatment parameters, ok to proceed with treatment.          Post Infusion Assessment:  Patient tolerated infusion without incident.  Blood return noted pre and post infusion.  Site patent and intact, free from redness, edema or discomfort.  No evidence of extravasations.  Access discontinued per protocol.    Discharge Plan:   Discharge instructions reviewed with: Patient.  Patient and/or family verbalized understanding of discharge instructions and all questions answered.  Copy of AVS reviewed with patient and/or family.  Patient will return 12/20/17 for next appointment.  Patient discharged in stable condition accompanied by: self.  Departure Mode: Ambulatory.    Ladonna Boo RN                      
Improving

## 2023-07-03 NOTE — BH TREATMENT PLAN - NSBHPRIMARYDX_PSY_ALL_CORE
Care Transition BPCI-A Episode created.  This patient has been identified as being eligible for the BPCI-A program. A review of the Final DRG and Unplanned Readmission risk score will be completed at discharge.  If the patient is identified upon discharge as high risk for unplanned readmission, the patient will be assigned to a BPCI-A nurse for 90 days follow-up post-discharge.     
Major depressive disorder, recurrent    
Major depressive disorder, recurrent

## 2023-07-03 NOTE — BH DISCHARGE NOTE NURSING/SOCIAL WORK/PSYCH REHAB - NSCDUDCCRISIS_PSY_A_CORE
[facility]  1 [phone]) [facility] ([phone])  Text "WELL" to 10570  Website: www.[employer]DVista.[facility]/.National Suicide Prevention Lifeline 0 (794) 036-1590(433) 901-1266/988 Suicide and Crisis Lifeline

## 2023-07-03 NOTE — BH INPATIENT PSYCHIATRY DISCHARGE NOTE - OTHER PAST PSYCHIATRIC HISTORY (INCLUDE DETAILS REGARDING ONSET, COURSE OF ILLNESS, INPATIENT/OUTPATIENT TREATMENT)
Patient has a past psychiatric history of depression and anxiety.  She has a history of substance use (crack cocaine, benzodiazepines).  Patient reports 5 prior IPP admissions (most recently at [facility] in 12/2018).  She also reports 5 prior suicide attempts
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
## 2023-07-03 NOTE — BH INPATIENT PSYCHIATRY PROGRESS NOTE - NSBHFUPINTERVALHXFT_PSY_A_CORE
Patient seen and evaluated. As per nursing report PRN for sleep with good effect. On approach patient calm and cooperative. No agitation or aggression noted. Patient verbalizes continuing to feel better since admission. States she is sleeping betters. Verbalizes an overall improved mood. Per RN complaints of constipation. Patient prescribed senna standing. Patient has miralax PRN also ordered which patient has not been taken. RN will advise patient to take miralax and continue to monitor. Denies any suicidal/homicidal ideations. Able to contract for safety. Denies any A/V hallucinations. No evidence of psychosis noted. Patient continues to appear to be enthusiastic and optimistic about Rehab. Agreed to start tapering off Klonopin. Decrease in Klonopin started Friday. Patient states she has been doing well with the decreased dose. CATCH team in place. Patient visible on the unit engaging with peers and attending groups. Awaiting rehab placement. Patient seen and evaluated. As per nursing report PRN for sleep with good effect. On approach patient calm and cooperative. No agitation or aggression noted. Patient verbalizes continuing to feel better since admission. States she is sleeping betters. Verbalizes an overall improved mood. Per RN complaints of constipation. Patient prescribed senna standing. Patient has miralax PRN also ordered which patient has not been taken. RN will advise patient to take miralax and continue to monitor. Denies any suicidal/homicidal ideations. Able to contract for safety. Denies any A/V hallucinations. No evidence of psychosis noted. Patient continues to appear to be enthusiastic and optimistic about Rehab. Agreed to start tapering off Klonopin. Decrease in Klonopin started Friday. Patient states she has been doing well with the decreased dose. CATCH team in place. Patient visible on the unit engaging with peers and attending groups. Awaiting rehab placement.     Patient accepted to Rehab. Anticipated discharge Wednesday 7/5/23. Does not warrant continued Inpatient hospitalization. Patient does not present an imminent risk to self or others at this time. Patient seen and evaluated. As per nursing report PRN for sleep with good effect. On approach patient calm and cooperative. No agitation or aggression noted. Patient verbalizes continuing to feel better since admission. States she is sleeping betters. Verbalizes an overall improved mood. Per RN complaints of constipation. Patient prescribed senna standing. Patient has miralax PRN also ordered which patient has not been taken. RN will advise patient to take miralax and continue to monitor. Denies any suicidal/homicidal ideations. Able to contract for safety. Discussed coping skills. Denies any A/V hallucinations. No evidence of psychosis noted. Patient continues to appear to be enthusiastic and optimistic about Rehab. Agreed to start tapering off Klonopin. Decrease in Klonopin started Friday. Patient states she has been doing well with the decreased dose. CATCH team in place. Patient visible on the unit engaging with peers and attending groups. Awaiting rehab placement.     Patient accepted to Rehab. Anticipated discharge Wednesday 7/5/23. Does not warrant continued Inpatient hospitalization. Patient does not present an imminent risk to self or others at this time. Rehab to continue taper.
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
## 2023-07-03 NOTE — BH INPATIENT PSYCHIATRY PROGRESS NOTE - NSCGISEVERILLNESS_PSY_ALL_CORE
5 = Markedly ill - intrusive symptoms that distinctly impair social/occupational function or cause intrusive levels of distress 1 = Normal – not at all ill, symptoms of disorder not present past seven days

## 2023-07-03 NOTE — BH TREATMENT PLAN - NSTXSUBMISGOAL_PSY_ALL_CORE
Accept referral for substance abuse treatment on discharge
Will verbalize 3 ways in which being sober can impact life positively

## 2023-07-03 NOTE — BH TREATMENT PLAN - NSTXSUICIDINTERRN_PSY_ALL_CORE
RN to assess patients behavior and be alert for increased signs of impulsivity/agitation.  RN will monitor patient and provide support and comfort and provedie safety on unit.    RN to encourage medication compliance and provide support and education as needed on Dx, coping skills, medication, and safety planning.  RN to Encourage daily ADL's  RN to Encourage group attendance  RN will assess and intervene for any suicidal ideations
RN to assess patients behavior and be alert for increased signs of impulsivity/agitation.  RN will monitor patient and provide support and comfort and provedie safety on unit.    RN to encourage medication compliance and provide support and education as needed on Dx, coping skills, medication, and safety planning.  RN to Encourage daily ADL's  RN to Encourage group attendance  RN will assess and intervene for any suicidal ideations

## 2023-07-03 NOTE — BH INPATIENT PSYCHIATRY DISCHARGE NOTE - NSBHDCMEDICALFT_PSY_A_CORE
HTN: patient precribed propanolol, Amlodipine, Hydralazine HTN: patient prescribed propanolol, Amlodipine, Hydralazine

## 2023-07-03 NOTE — BH INPATIENT PSYCHIATRY DISCHARGE NOTE - NSDCMRMEDTOKEN_GEN_ALL_CORE_FT
acetaminophen 325 mg oral tablet: 2 tab(s) orally every 6 hours, As needed, Temp greater or equal to 38C (100.4F), Mild Pain (1 - 3)  amoxicillin 875 mg oral tablet: 1 tab(s) orally 2 times a day   buprenorphine-naloxone 8 mg-2 mg sublingual tablet: 2 tab(s) sublingual once a day  cefpodoxime 200 mg oral tablet: 1 tab(s) orally 2 times a day, last day on 10/21  escitalopram 10 mg oral tablet: 1 tab(s) orally once a day  gabapentin 100 mg oral capsule: 1 cap(s) orally 3 times a day  hydroCHLOROthiazide 12.5 mg oral capsule: 1 cap(s) orally once a day  ibuprofen 600 mg oral tablet: 1 tab(s) orally every 8 hours   losartan 100 mg oral tablet: 1 tab(s) orally once a day  Metoprolol Tartrate 100 mg oral tablet: 1 tab(s) orally 2 times a day  omeprazole 20 mg oral delayed release capsule: 1 cap(s) orally once a day  QUEtiapine 100 mg oral tablet: 1 tab(s) orally 2 times a day  senna oral tablet: 2 tab(s) orally once a day (at bedtime)  simvastatin 40 mg oral tablet: 1 tab(s) orally once a day (at bedtime)   acetaminophen 325 mg oral tablet: 2 tab(s) orally every 6 hours as needed for Temp greater or equal to 38C (100.4F), Mild Pain (1 - 3) Continue until told otherwise by your provider.  amLODIPine 10 mg oral tablet: 1 tab(s) orally once a day Continue until told otherwise by your provider.  clonazePAM 0.5 mg oral tablet: 1 tab(s) orally once a day Continue until told otherwise by your provider. Rehab to continue Taper  clonazePAM 1 mg oral tablet: 1 tab(s) orally once a day (at bedtime) Continue until told otherwise by your provider. Rehab to continue Taper.  DULoxetine 60 mg oral delayed release capsule: 1 cap(s) orally once a day Continue until told otherwise by your provider.  hydrALAZINE 25 mg oral tablet: 1 tab(s) orally 2 times a day Continue until told otherwise by your provider.  ibuprofen 400 mg oral tablet: 1 tab(s) orally every 6 hours as needed for Moderate Pain (4 - 6) Continue until told otherwise by your provider.  lidocaine 2% mucous membrane solution: 10 milliliter(s) mucous membrane every 6 hours as needed for Dental pain Swish and spit. Continue until told otherwise by your provider.  melatonin 3 mg oral tablet: 1 tab(s) orally once a day (at bedtime) as needed for  insomnia Continue until told otherwise by your provider.  Minipress 2 mg oral capsule: 1 cap(s) orally once a day (at bedtime) Continue until told otherwise by your provider.  polyethylene glycol 3350 oral powder for reconstitution: 17 gram(s) orally once a day as needed for Constipation Continue until told otherwise by your provider.  propranolol 10 mg oral tablet: 1 tab(s) orally 2 times a day Continue until told otherwise by your provider.  senna leaf extract oral tablet: 2 tab(s) orally once a day (at bedtime) as needed for  constipation Continue until told otherwise by your provider.  traZODone 150 mg oral tablet: 1 tab(s) orally once a day (at bedtime) Continue until told otherwise by your provider.

## 2023-07-03 NOTE — BH INPATIENT PSYCHIATRY PROGRESS NOTE - NSBHCHARTREVIEWVS_PSY_A_CORE FT
Vital Signs Last 24 Hrs  T(C): 35.9 (07-03-23 @ 05:37), Max: 35.9 (07-03-23 @ 05:37)  T(F): 96.7 (07-03-23 @ 05:37), Max: 96.7 (07-03-23 @ 05:37)  HR: 99 (07-03-23 @ 08:21) (85 - 99)  BP: 129/74 (07-03-23 @ 08:21) (117/72 - 129/74)  BP(mean): --  RR: 20 (07-03-23 @ 08:21) (20 - 20)  SpO2: --     Vital Signs Last 24 Hrs  T(C): 36 (07-03-23 @ 16:05), Max: 36 (07-03-23 @ 16:05)  T(F): 96.8 (07-03-23 @ 16:05), Max: 96.8 (07-03-23 @ 16:05)  HR: 110 (07-03-23 @ 16:05) (97 - 110)  BP: 123/65 (07-03-23 @ 16:05) (119/76 - 129/74)  BP(mean): --  RR: 17 (07-03-23 @ 16:05) (17 - 20)  SpO2: --     Vital Signs Last 24 Hrs  T(C): 35.9 (07-05-23 @ 08:27), Max: 36 (07-04-23 @ 16:00)  T(F): 96.6 (07-05-23 @ 08:27), Max: 96.8 (07-04-23 @ 16:00)  HR: 106 (07-05-23 @ 08:27) (92 - 108)  BP: 134/63 (07-05-23 @ 08:27) (108/57 - 147/79)  BP(mean): --  RR: 18 (07-05-23 @ 08:27) (18 - 18)  SpO2: --

## 2023-07-03 NOTE — BH TREATMENT PLAN - NSTXPLANTHERAPYSESSIONSFT_PSY_ALL_CORE
06-26-23  Type of therapy: Dialectical behavior therapy, Coping skills  Type of session: Group  Level of patient participation: Attentive, Engaged, Participates  Duration of participation: 45 minutes  Therapy conducted by: Social work  Therapy Summary: Patient attended the Crisis Skills Group with  and peers. The STOP Skill was reviewed which is a distress tolerance DBT Skill that helps one respond to stressors by staying in control of emotions and not acting solely on impulse. The following steps were reviewed: “Stop, Take a step back, Observe, Proceed effectively”. Patients offered support and feedback while  facilitated the group.    Fatou was an active participant and remained engaged in the group dialogue. She appeared open to the therapeutic intervention. She is encouraged to attend future sessions.    06-26-23  Type of therapy: Coping skills, Creative arts therapy, Inspiration and motiviation, Self esteem, Stress management  Type of session: Individual  Level of patient participation: Resistance to participation  Duration of participation: 30 minutes  Therapy conducted by: Psych rehab  Therapy Summary: Pt will need increased encouragement .    06-26-23  Type of therapy: Coping skills, Mindfulness  Type of session: Group  Level of patient participation: Attentive, Engaged, Participates  Duration of participation: 45 minutes  Therapy conducted by: Social work  Therapy Summary: Patient attended the Patient Education Group with  and peers.  reviewed physical, mental and soothing grounding techniques to help quiet distressing thoughts. The 5-4-3-2-1 method was also taught.  facilitated the discussion while patients provided feedback and support.     Fatou participated and appeared open to reviewing the materials presented. She engaged in the group mindfulness activity. She remained in good behavioral control.  
  06-26-23  Type of therapy: Coping skills, Creative arts therapy, Inspiration and motiviation, Self esteem, Stress management  Type of session: Individual  Level of patient participation: Resistance to participation  Duration of participation: 30 minutes  Therapy conducted by: Psych rehab  Therapy Summary: Pt will need increased encouragement .    06-26-23  Type of therapy: Coping skills, Mindfulness  Type of session: Group  Level of patient participation: Attentive, Engaged, Participates  Duration of participation: 45 minutes  Therapy conducted by: Social work  Therapy Summary: Patient attended the Patient Education Group with  and peers.  reviewed physical, mental and soothing grounding techniques to help quiet distressing thoughts. The 5-4-3-2-1 method was also taught.  facilitated the discussion while patients provided feedback and support.     Fatou participated and appeared open to reviewing the materials presented. She engaged in the group mindfulness activity. She remained in good behavioral control.    06-28-23  Type of therapy: Dialectical behavior therapy, Coping skills  Type of session: Group  Level of patient participation: Attentive, Engaged, Participates  Duration of participation: 45 minutes  Therapy conducted by: Social work  Therapy Summary: Patient attended the Crisis Skills Group with  and peers. The Self-Soothe with the Six Senses Skill was reviewed, a Dialectical Behavior Therapy skill that uses the senses to help you calm down during times of distress. The following skills were reviewed: “vision, hearing, smell, taste, touch and movement”. Patients offered support and feedback while  facilitated the group.    aFtou was an active participant. She appeared open to the therapeutic intervention. She feels she can use touch (hot baths), taste (comfort food), prayer and time spend with her Grandchildren to Self Soothe. She engaged well with peers.    06-28-23  Type of therapy: Other, Communication  Type of session: Group  Level of patient participation: Attentive, Engaged, Participates  Duration of participation: 45 minutes  Therapy conducted by: Social work  Therapy Summary: Patient attended the Patient Education Group with  and peers. The Passive, Aggressive, and Assertive Communication worksheet was reviewed, which gives an overview of each communication style, along with tips to help patients with recognizing each one. Patients were asked to consider how one can replace aggression and passivity with assertiveness. Patients offered support and feedback while  facilitated the discussion.      Fatou was an active participant. She appeared open to reviewing the materials presented and was able to identify benefits of assertive communication. She remained focused, alert and engaged for the groups duration.    06-28-23  Type of therapy: Inspiration and motiviation, Self esteem  Type of session: Group  Level of patient participation: Attentive, Engaged, Participates  Duration of participation: 45 minutes  Therapy conducted by: Social work  Therapy Summary: Patient attended the Social Work group with SW and peers. The three good people, strengths-spotting exercise was reviewed to help patients enter the "strength-spotting mindset". Patients were encouraged to consider their own strengths and how they can be used to help overcome challenges. SW facilitated the group and patients provided feedback and support.    06-30-23  Type of therapy: Dialectical behavior therapy, Coping skills  Type of session: Group  Level of patient participation: Attentive, Engaged, Participates  Duration of participation: 45 minutes  Therapy conducted by: Social work  Therapy Summary: Patient attended the Crisis Skills Group with  and peers. The PLEASE skill was reviewed which focuses on taking care of basic needs so that one can make healthier decisions and be less vulnerable to emotional disruptions. Skills reviewed include treating physical illness, getting adequate sleep, avoiding illicit substances, eating a healthy diet and getting exercise. Patients offered support and feedback while  facilitated the group.    Fatou was an active participant. She appeared open to the therapeutic intervention and agreed with the benefits of "treating physical illness" and adhering to her medication regimen. She remained focused and engaged for the groups duration.    06-30-23  Type of therapy: Focus on community resources, Transition planning  Type of session: Group  Level of patient participation: Attentive, Engaged, Participates  Duration of participation: 45 minutes  Therapy conducted by: Social work  Therapy Summary: Patient attended the Social Work Group with  and peers. The topic of building a routine was discussed, as well as the (mental health) benefits of creating daily structure. Patients were given a sample routine worksheet to complete at their leisure. Patients offered support and feedback while  facilitated the discussion.      Fatou was an active participant. She appeared open to reviewing the materials presented. She agreed with the benefits of engaging in healthy activities and hobbies (including prayer, attending Hindu and time spent with family). She engaged well with peers, remaining focused and alert for the groups duration.    06-30-23  Type of therapy: Coping skills, Self esteem  Type of session: Group  Level of patient participation: Attentive, Engaged, Participates  Duration of participation: 45 minutes  Therapy conducted by: Social work  Therapy Summary: Patient attended the Social Work group with SW and peers and were asked to identify times where they have shown positive qualities. Patients were encouraged to consider their strengths and how they can be used to help overcome challenges. The goal of this exercise was to increase self-esteem and positive thoughts. SW facilitated the group and patients provided feedback and support.    Fatou was an active participant and engaged in the group activity. She discussed having courage to seek help via this hospital admission. She provided peer support and empathy when appropriate.

## 2023-07-03 NOTE — BH INPATIENT PSYCHIATRY PROGRESS NOTE - CURRENT MEDICATION
MEDICATIONS  (STANDING):  amLODIPine   Tablet 10 milliGRAM(s) Oral daily  clonazePAM  Tablet 1 milliGRAM(s) Oral at bedtime  clonazePAM  Tablet 0.5 milliGRAM(s) Oral daily  DULoxetine 60 milliGRAM(s) Oral daily  hydrALAZINE 25 milliGRAM(s) Oral two times a day  prazosin. 2 milliGRAM(s) Oral at bedtime  propranolol 10 milliGRAM(s) Oral two times a day  senna 2 Tablet(s) Oral at bedtime  traZODone 150 milliGRAM(s) Oral at bedtime    MEDICATIONS  (PRN):  acetaminophen     Tablet .. 650 milliGRAM(s) Oral every 6 hours PRN Temp greater or equal to 38C (100.4F), Mild Pain (1 - 3)  diphenhydrAMINE 25 milliGRAM(s) Oral every 4 hours PRN Rash and/or Itching  haloperidol     Tablet 5 milliGRAM(s) Oral every 6 hours PRN Agitation  ibuprofen  Tablet. 400 milliGRAM(s) Oral every 6 hours PRN Moderate Pain (4 - 6)  lidocaine 2% Viscous 10 milliLiter(s) Swish and Spit every 6 hours PRN dental pain  melatonin. 5 milliGRAM(s) Oral at bedtime PRN Insomnia  polyethylene glycol 3350 17 Gram(s) Oral daily PRN Constipation   MEDICATIONS  (STANDING):  amLODIPine   Tablet 10 milliGRAM(s) Oral daily  clonazePAM  Tablet 0.5 milliGRAM(s) Oral daily  clonazePAM  Tablet 1 milliGRAM(s) Oral at bedtime  DULoxetine 60 milliGRAM(s) Oral daily  hydrALAZINE 25 milliGRAM(s) Oral two times a day  prazosin. 2 milliGRAM(s) Oral at bedtime  propranolol 10 milliGRAM(s) Oral two times a day  senna 2 Tablet(s) Oral at bedtime  traZODone 150 milliGRAM(s) Oral at bedtime    MEDICATIONS  (PRN):  acetaminophen     Tablet .. 650 milliGRAM(s) Oral every 6 hours PRN Temp greater or equal to 38C (100.4F), Mild Pain (1 - 3)  diphenhydrAMINE 25 milliGRAM(s) Oral every 4 hours PRN Rash and/or Itching  haloperidol     Tablet 5 milliGRAM(s) Oral every 6 hours PRN Agitation  ibuprofen  Tablet. 400 milliGRAM(s) Oral every 6 hours PRN Moderate Pain (4 - 6)  lidocaine 2% Viscous 10 milliLiter(s) Swish and Spit every 6 hours PRN dental pain  melatonin. 5 milliGRAM(s) Oral at bedtime PRN Insomnia  polyethylene glycol 3350 17 Gram(s) Oral daily PRN Constipation

## 2023-07-03 NOTE — BH INPATIENT PSYCHIATRY DISCHARGE NOTE - NSDCCPCAREPLAN_GEN_ALL_CORE_FT
PRINCIPAL DISCHARGE DIAGNOSIS  Diagnosis: Major depressive disorder, recurrent  Assessment and Plan of Treatment: Patient to continue with prescribed medication and follow up with outpatient      SECONDARY DISCHARGE DIAGNOSES  Diagnosis: Generalized anxiety disorder with panic attacks  Assessment and Plan of Treatment: Patient to continue with prescribed medication and follow up with outpatient    Diagnosis: Cocaine abuse  Assessment and Plan of Treatment: Patient to continue with prescribed medication and follow up with outpatient

## 2023-07-03 NOTE — BH INPATIENT PSYCHIATRY PROGRESS NOTE - NSBHMETABOLIC_PSY_ALL_CORE_FT
BMI: BMI (kg/m2): 27.9 (06-25-23 @ 01:28)  HbA1c: A1C with Estimated Average Glucose Result: 5.7 % (06-25-23 @ 08:40)    Glucose:   BP: 129/74 (07-03-23 @ 08:21) (117/72 - 156/74)  Lipid Panel: Date/Time: 06-25-23 @ 08:40  Cholesterol, Serum: 157  Direct LDL: --  HDL Cholesterol, Serum: 54  Total Cholesterol/HDL Ration Measurement: --  Triglycerides, Serum: 135   BMI: BMI (kg/m2): 27.9 (06-25-23 @ 01:28)  HbA1c: A1C with Estimated Average Glucose Result: 5.7 % (06-25-23 @ 08:40)    Glucose:   BP: 123/65 (07-03-23 @ 16:05) (117/72 - 156/74)  Lipid Panel: Date/Time: 06-25-23 @ 08:40  Cholesterol, Serum: 157  Direct LDL: --  HDL Cholesterol, Serum: 54  Total Cholesterol/HDL Ration Measurement: --  Triglycerides, Serum: 135   BMI: BMI (kg/m2): 27.9 (06-25-23 @ 01:28)  HbA1c: A1C with Estimated Average Glucose Result: 5.7 % (06-25-23 @ 08:40)    Glucose:   BP: 123/65 (07-03-23 @ 16:05) (117/72 - 143/76)  Lipid Panel: Date/Time: 06-25-23 @ 08:40  Cholesterol, Serum: 157  Direct LDL: --  HDL Cholesterol, Serum: 54  Total Cholesterol/HDL Ration Measurement: --  Triglycerides, Serum: 135   BMI: BMI (kg/m2): 27.9 (06-25-23 @ 01:28)  HbA1c: A1C with Estimated Average Glucose Result: 5.7 % (06-25-23 @ 08:40)    Glucose:   BP: 134/63 (07-05-23 @ 08:27) (108/57 - 168/95)  Lipid Panel: Date/Time: 06-25-23 @ 08:40  Cholesterol, Serum: 157  Direct LDL: --  HDL Cholesterol, Serum: 54  Total Cholesterol/HDL Ration Measurement: --  Triglycerides, Serum: 135

## 2023-07-04 LAB — SARS-COV-2 RNA SPEC QL NAA+PROBE: SIGNIFICANT CHANGE UP

## 2023-07-04 RX ADMIN — Medication 25 MILLIGRAM(S): at 20:22

## 2023-07-04 RX ADMIN — Medication 1 MILLIGRAM(S): at 20:07

## 2023-07-04 RX ADMIN — SENNA PLUS 2 TABLET(S): 8.6 TABLET ORAL at 20:08

## 2023-07-04 RX ADMIN — AMLODIPINE BESYLATE 10 MILLIGRAM(S): 2.5 TABLET ORAL at 08:18

## 2023-07-04 RX ADMIN — DULOXETINE HYDROCHLORIDE 60 MILLIGRAM(S): 30 CAPSULE, DELAYED RELEASE ORAL at 08:18

## 2023-07-04 RX ADMIN — Medication 0.5 MILLIGRAM(S): at 08:19

## 2023-07-04 RX ADMIN — Medication 25 MILLIGRAM(S): at 20:07

## 2023-07-04 RX ADMIN — Medication 150 MILLIGRAM(S): at 20:08

## 2023-07-04 RX ADMIN — Medication 25 MILLIGRAM(S): at 08:18

## 2023-07-04 RX ADMIN — Medication 2 MILLIGRAM(S): at 20:08

## 2023-07-05 VITALS
SYSTOLIC BLOOD PRESSURE: 134 MMHG | TEMPERATURE: 97 F | RESPIRATION RATE: 18 BRPM | HEART RATE: 106 BPM | DIASTOLIC BLOOD PRESSURE: 63 MMHG

## 2023-07-05 PROCEDURE — 99238 HOSP IP/OBS DSCHRG MGMT 30/<: CPT

## 2023-07-05 RX ADMIN — AMLODIPINE BESYLATE 10 MILLIGRAM(S): 2.5 TABLET ORAL at 08:10

## 2023-07-05 RX ADMIN — DULOXETINE HYDROCHLORIDE 60 MILLIGRAM(S): 30 CAPSULE, DELAYED RELEASE ORAL at 08:11

## 2023-07-05 RX ADMIN — Medication 25 MILLIGRAM(S): at 08:11

## 2023-07-05 RX ADMIN — Medication 0.5 MILLIGRAM(S): at 08:12

## 2023-07-05 NOTE — BH INPATIENT PSYCHIATRY PROGRESS NOTE - NSTXSUBMISGOAL_PSY_ALL_CORE
Will verbalize 3 ways in which being sober can impact life positively
Will verbalize 3 ways in which being sober can impact life positively
Accept referral for substance abuse treatment on discharge
Will verbalize 3 ways in which being sober can impact life positively
Accept referral for substance abuse treatment on discharge

## 2023-07-05 NOTE — BH CHART NOTE - NSDETAILSOTHERINTERV_PSY_ALL_CORE
Routine observation while on unit. Denies suicidal/ homicidal ideations. 
Routine observation while on unit. Patient to be discharged today. Denies suicidal/ homicidal ideations. Patient able to contract for safety. Does not warrant continued Inpatient hospitalization. Patient does not appear to be of risk to self or others at this time.

## 2023-07-05 NOTE — BH INPATIENT PSYCHIATRY PROGRESS NOTE - NSICDXBHPRIMARYDX_PSY_ALL_CORE
Major depressive disorder, recurrent   F33.9  

## 2023-07-05 NOTE — BH INPATIENT PSYCHIATRY PROGRESS NOTE - NSICDXBHSECONDARYDX_PSY_ALL_CORE
Generalized anxiety disorder with panic attacks   F41.1  Cocaine use disorder   F14.10  

## 2023-07-05 NOTE — BH INPATIENT PSYCHIATRY PROGRESS NOTE - PRN MEDS
MEDICATIONS  (PRN):  acetaminophen     Tablet .. 650 milliGRAM(s) Oral every 6 hours PRN Temp greater or equal to 38C (100.4F), Mild Pain (1 - 3)  diphenhydrAMINE 25 milliGRAM(s) Oral every 4 hours PRN Rash and/or Itching  haloperidol     Tablet 5 milliGRAM(s) Oral every 6 hours PRN Agitation  ibuprofen  Tablet. 400 milliGRAM(s) Oral every 6 hours PRN Moderate Pain (4 - 6)  lidocaine 2% Viscous 10 milliLiter(s) Swish and Spit every 6 hours PRN dental pain  melatonin. 5 milliGRAM(s) Oral at bedtime PRN Insomnia  polyethylene glycol 3350 17 Gram(s) Oral daily PRN Constipation  
MEDICATIONS  (PRN):  acetaminophen     Tablet .. 650 milliGRAM(s) Oral every 6 hours PRN Temp greater or equal to 38C (100.4F), Mild Pain (1 - 3)  diphenhydrAMINE 25 milliGRAM(s) Oral every 4 hours PRN Rash and/or Itching  haloperidol     Tablet 5 milliGRAM(s) Oral every 6 hours PRN Agitation  ibuprofen  Tablet. 400 milliGRAM(s) Oral every 6 hours PRN Moderate Pain (4 - 6)  melatonin. 5 milliGRAM(s) Oral at bedtime PRN Insomnia  polyethylene glycol 3350 17 Gram(s) Oral daily PRN Constipation  
MEDICATIONS  (PRN):  acetaminophen     Tablet .. 650 milliGRAM(s) Oral every 6 hours PRN Temp greater or equal to 38C (100.4F), Mild Pain (1 - 3)  haloperidol     Tablet 5 milliGRAM(s) Oral every 6 hours PRN Agitation  melatonin. 5 milliGRAM(s) Oral at bedtime PRN Insomnia  polyethylene glycol 3350 17 Gram(s) Oral daily PRN Constipation  
MEDICATIONS  (PRN):  acetaminophen     Tablet .. 650 milliGRAM(s) Oral every 6 hours PRN Temp greater or equal to 38C (100.4F), Mild Pain (1 - 3)  haloperidol     Tablet 5 milliGRAM(s) Oral every 6 hours PRN Agitation  ibuprofen  Tablet. 400 milliGRAM(s) Oral every 6 hours PRN Moderate Pain (4 - 6)  melatonin. 5 milliGRAM(s) Oral at bedtime PRN Insomnia  polyethylene glycol 3350 17 Gram(s) Oral daily PRN Constipation  
MEDICATIONS  (PRN):  acetaminophen     Tablet .. 650 milliGRAM(s) Oral every 6 hours PRN Temp greater or equal to 38C (100.4F), Mild Pain (1 - 3)  diphenhydrAMINE 25 milliGRAM(s) Oral every 4 hours PRN Rash and/or Itching  haloperidol     Tablet 5 milliGRAM(s) Oral every 6 hours PRN Agitation  ibuprofen  Tablet. 400 milliGRAM(s) Oral every 6 hours PRN Moderate Pain (4 - 6)  lidocaine 2% Viscous 10 milliLiter(s) Swish and Spit every 6 hours PRN dental pain  melatonin. 5 milliGRAM(s) Oral at bedtime PRN Insomnia  polyethylene glycol 3350 17 Gram(s) Oral daily PRN Constipation  
MEDICATIONS  (PRN):  acetaminophen     Tablet .. 650 milliGRAM(s) Oral every 6 hours PRN Temp greater or equal to 38C (100.4F), Mild Pain (1 - 3)  haloperidol     Tablet 5 milliGRAM(s) Oral every 6 hours PRN Agitation  melatonin. 5 milliGRAM(s) Oral at bedtime PRN Insomnia  polyethylene glycol 3350 17 Gram(s) Oral daily PRN Constipation  
MEDICATIONS  (PRN):  acetaminophen     Tablet .. 650 milliGRAM(s) Oral every 6 hours PRN Temp greater or equal to 38C (100.4F), Mild Pain (1 - 3)  diphenhydrAMINE 25 milliGRAM(s) Oral every 4 hours PRN Rash and/or Itching  haloperidol     Tablet 5 milliGRAM(s) Oral every 6 hours PRN Agitation  ibuprofen  Tablet. 400 milliGRAM(s) Oral every 6 hours PRN Moderate Pain (4 - 6)  melatonin. 5 milliGRAM(s) Oral at bedtime PRN Insomnia  polyethylene glycol 3350 17 Gram(s) Oral daily PRN Constipation

## 2023-07-05 NOTE — BH INPATIENT PSYCHIATRY PROGRESS NOTE - NSBHCHARTREVIEWVS_PSY_A_CORE FT
Vital Signs Last 24 Hrs  T(C): 35.9 (07-05-23 @ 08:27), Max: 36 (07-04-23 @ 16:00)  T(F): 96.6 (07-05-23 @ 08:27), Max: 96.8 (07-04-23 @ 16:00)  HR: 106 (07-05-23 @ 08:27) (92 - 108)  BP: 134/63 (07-05-23 @ 08:27) (108/57 - 147/79)  BP(mean): --  RR: 18 (07-05-23 @ 08:27) (18 - 18)  SpO2: --

## 2023-07-05 NOTE — BH INPATIENT PSYCHIATRY PROGRESS NOTE - CURRENT MEDICATION
MEDICATIONS  (STANDING):  amLODIPine   Tablet 10 milliGRAM(s) Oral daily  clonazePAM  Tablet 1 milliGRAM(s) Oral at bedtime  clonazePAM  Tablet 0.5 milliGRAM(s) Oral daily  DULoxetine 60 milliGRAM(s) Oral daily  hydrALAZINE 25 milliGRAM(s) Oral two times a day  prazosin. 2 milliGRAM(s) Oral at bedtime  propranolol 10 milliGRAM(s) Oral two times a day  senna 2 Tablet(s) Oral at bedtime  traZODone 150 milliGRAM(s) Oral at bedtime    MEDICATIONS  (PRN):  acetaminophen     Tablet .. 650 milliGRAM(s) Oral every 6 hours PRN Temp greater or equal to 38C (100.4F), Mild Pain (1 - 3)  diphenhydrAMINE 25 milliGRAM(s) Oral every 4 hours PRN Rash and/or Itching  haloperidol     Tablet 5 milliGRAM(s) Oral every 6 hours PRN Agitation  ibuprofen  Tablet. 400 milliGRAM(s) Oral every 6 hours PRN Moderate Pain (4 - 6)  lidocaine 2% Viscous 10 milliLiter(s) Swish and Spit every 6 hours PRN dental pain  melatonin. 5 milliGRAM(s) Oral at bedtime PRN Insomnia  polyethylene glycol 3350 17 Gram(s) Oral daily PRN Constipation

## 2023-07-05 NOTE — BH INPATIENT PSYCHIATRY PROGRESS NOTE - NSTXSUICIDGOAL_PSY_ALL_CORE
Will express feelings associated with suicidal ideation
Talk to staff and ask for assistance when having suicidal wishes instead of acting out
Talk to staff and ask for assistance when having suicidal wishes instead of acting out
Will express feelings associated with suicidal ideation

## 2023-07-05 NOTE — BH INPATIENT PSYCHIATRY PROGRESS NOTE - NSBHMETABOLIC_PSY_ALL_CORE_FT
BMI: BMI (kg/m2): 27.9 (06-25-23 @ 01:28)  HbA1c: A1C with Estimated Average Glucose Result: 5.7 % (06-25-23 @ 08:40)    Glucose:   BP: 134/63 (07-05-23 @ 08:27) (108/57 - 168/95)  Lipid Panel: Date/Time: 06-25-23 @ 08:40  Cholesterol, Serum: 157  Direct LDL: --  HDL Cholesterol, Serum: 54  Total Cholesterol/HDL Ration Measurement: --  Triglycerides, Serum: 135

## 2023-07-05 NOTE — BH INPATIENT PSYCHIATRY PROGRESS NOTE - NSBHROSSYSTEMS_PSY_ALL_CORE
Caller would like to discuss an/a Return call to confirm if she is allowed to take an over the counter Woman's one a day vitamin. Writer advised caller of callback within 24-72 hours.    Patient Name: Jenny Nuñez  Caller Name: Stephanie  Callback Number: [phone]  Best Availability: any  Can A Detailed Message Be left? yes  Did you confirm the message with the caller?: yes    Thank you,  Karly Rucker    
Per Dr. Starr-  Patient is ok to take a once a day multivitamin. Reviewed recommendations with patient who verbalized understanding.   
Psychiatric

## 2023-07-05 NOTE — BH INPATIENT PSYCHIATRY PROGRESS NOTE - NSBHFUPINTERVALCCFT_PSY_A_CORE
"Im ready to go"
"I didn't sleep much"
"I feel good"
"I feel like I was sleeping a long time"
"I slept good"
"Just catching up on sleep"
"I feel good"

## 2023-07-05 NOTE — BH INPATIENT PSYCHIATRY PROGRESS NOTE - NSTXSUICIDPROGRES_PSY_ALL_CORE
Improving
Improving
Met - goal discontinued
Improving
Improving

## 2023-07-05 NOTE — BH INPATIENT PSYCHIATRY PROGRESS NOTE - NSBHATTESTBILLING_PSY_A_CORE
38015-Zayjnlzemc OBS or IP - low complexity OR 25-34 mins
80978-Wkdvzjkykd OBS or IP - low complexity OR 25-34 mins
35901-Stlxgptihe OBS or IP - low complexity OR 25-34 mins
[id number]-Jjfkxljlec OBS or IP - low complexity OR 25-34 mins
54350-Yqbuizfcwo OBS or IP - low complexity OR 25-34 mins
46469-Nkowlvwiya OBS or IP - low complexity OR 25-34 mins
25038-Bhyxssbeol OBS or IP - low complexity OR 25-34 mins

## 2023-07-05 NOTE — BH INPATIENT PSYCHIATRY PROGRESS NOTE - NSBHFUPINTERVALHXFT_PSY_A_CORE
D/C today to rehab facility. Transportation set up. Patient appeared to be in good spirits today. Insight and judgment have improved. Denies suicidal/ homicidal ideations. Patient able to contract for safety, stating the importance of compliance with medication and treatment. Compliant with medication. Does not warrant continued Inpatient hospitalization. Writer, PA student reviewed D/C papers with patient. Patient verbalized understanding. Patient does not appear to be of imminent danger to self or others at this time.

## 2023-07-05 NOTE — BH INPATIENT PSYCHIATRY PROGRESS NOTE - NSTXDEPRESINTERMD_PSY_ALL_CORE
Medication management and milieu therapy

## 2023-07-05 NOTE — BH INPATIENT PSYCHIATRY PROGRESS NOTE - NSBHCONSBHPROVCNTCTNOFT_PSY_A_CORE
office closed on weekend

## 2023-07-05 NOTE — BH CHART NOTE - NSSUICPROTFACT_PSY_ALL_CORE
Responsibility to children, family, or others/Identifies reasons for living
Responsibility to children, family, or others/Identifies reasons for living

## 2023-07-05 NOTE — BH INPATIENT PSYCHIATRY PROGRESS NOTE - NSDCCRITERIA_PSY_ALL_CORE
Improvement of symptoms 

## 2023-07-05 NOTE — BH CHART NOTE - DETAILS
Denies any active suicidal ideations, just passive. No intent or plan.
Repeat assessment done for time period of hospitalization. Denies suicidal/ homicidal ideations. Patient able to contract for safety. Compliant with medication. Does not warrant continued Inpatient hospitalization. Patient does not appear to be of risk to self or others at this time.
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
## 2023-07-05 NOTE — BH INPATIENT PSYCHIATRY PROGRESS NOTE - NSBHMSEAFFQUAL_PSY_A_CORE
Depressed/Anxious
Euthymic/Anxious
Depressed/Anxious
Depressed/Anxious
Euthymic/Anxious
Euthymic/Anxious
Depressed/Anxious

## 2023-07-05 NOTE — BH CHART NOTE - RISK ASSESSMENT
Repeat assessment done for time period of hospitalization. Denies suicidal/ homicidal ideations. Patient able to contract for safety. Compliant with medication. Does not warrant continued Inpatient hospitalization. Patient does not appear to be of risk to self or others at this time.
Denies any active suicidal ideations, just passive. No intent or plan.

## 2023-07-07 DIAGNOSIS — I10 ESSENTIAL (PRIMARY) HYPERTENSION: ICD-10-CM

## 2023-07-07 DIAGNOSIS — G47.00 INSOMNIA, UNSPECIFIED: ICD-10-CM

## 2023-07-07 DIAGNOSIS — Z56.0 UNEMPLOYMENT, UNSPECIFIED: ICD-10-CM

## 2023-07-07 DIAGNOSIS — K59.00 CONSTIPATION, UNSPECIFIED: ICD-10-CM

## 2023-07-07 DIAGNOSIS — K08.89 OTHER SPECIFIED DISORDERS OF TEETH AND SUPPORTING STRUCTURES: ICD-10-CM

## 2023-07-07 DIAGNOSIS — F43.10 POST-TRAUMATIC STRESS DISORDER, UNSPECIFIED: ICD-10-CM

## 2023-07-07 DIAGNOSIS — Z91.148 PATIENT'S OTHER NONCOMPLIANCE WITH MEDICATION REGIMEN FOR OTHER REASON: ICD-10-CM

## 2023-07-07 DIAGNOSIS — R45.851 SUICIDAL IDEATIONS: ICD-10-CM

## 2023-07-07 DIAGNOSIS — F13.90 SEDATIVE, HYPNOTIC, OR ANXIOLYTIC USE, UNSPECIFIED, UNCOMPLICATED: ICD-10-CM

## 2023-07-07 DIAGNOSIS — Z20.822 CONTACT WITH AND (SUSPECTED) EXPOSURE TO COVID-19: ICD-10-CM

## 2023-07-07 DIAGNOSIS — F41.0 PANIC DISORDER [EPISODIC PAROXYSMAL ANXIETY]: ICD-10-CM

## 2023-07-07 DIAGNOSIS — E78.5 HYPERLIPIDEMIA, UNSPECIFIED: ICD-10-CM

## 2023-07-07 DIAGNOSIS — F41.1 GENERALIZED ANXIETY DISORDER: ICD-10-CM

## 2023-07-07 DIAGNOSIS — Z91.410 PERSONAL HISTORY OF ADULT PHYSICAL AND SEXUAL ABUSE: ICD-10-CM

## 2023-07-07 DIAGNOSIS — Z91.51 PERSONAL HISTORY OF SUICIDAL BEHAVIOR: ICD-10-CM

## 2023-07-07 DIAGNOSIS — F14.10 COCAINE ABUSE, UNCOMPLICATED: ICD-10-CM

## 2023-07-07 DIAGNOSIS — F33.9 MAJOR DEPRESSIVE DISORDER, RECURRENT, UNSPECIFIED: ICD-10-CM

## 2023-07-07 DIAGNOSIS — B19.20 UNSPECIFIED VIRAL HEPATITIS C WITHOUT HEPATIC COMA: ICD-10-CM

## 2023-07-07 SDOH — ECONOMIC STABILITY - INCOME SECURITY: UNEMPLOYMENT, UNSPECIFIED: Z56.0

## 2023-07-30 NOTE — ED ADULT NURSE NOTE - AREA
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 MRI brain showed acute/subacute lacunar infarct with some chronic infarcts. Patient now with gait Instability, ADL impairments and Functional impairments- pt/ot/dvt ppx    # Right acute/subacute lacunar infarction within the posterior limb of the internal capsule.  - MRI brain showed acute/subacute lacunar infarct posterior limbic R IC with chronic infarct R cerebellum and L centrum semiovale  - CTA H/N with arterial stenosis  -asa, plavix, statin    #HLD/ mild transaminitis  - Lipitor   - monitor lft's - if increase- can decrease ot stop statin    #ckd3  - monitor,   - avoid nephrotoxic agents    #DM 2  - ISS and FS  - home meds- metformin 500 bid- will hold due to CKD, monitor renal function can restart once improved.   - A1c 6.6 on 7/23    #BPH  - tamsulosin    # depression  - zoloft    #HTN-  monitor off meds,  reasses daily    #Gout  - Zyloprim     #Pain management  - Tylenol PRN    #DVT ppx  - Heparin    #GI ppx  - Protonix 40mg lower

## 2023-08-02 ENCOUNTER — TRANSCRIPTION ENCOUNTER (OUTPATIENT)
Age: 58
End: 2023-08-02

## 2023-08-15 NOTE — ED ADULT NURSE NOTE - CHPI ED NUR QUALITY2
Almas Hawk 71 y.o. male MRN: [id number]  Unit/Bed#: E2 -01 Encounter: [id number]      Subjective:  Ronen Ramos was seen and evaluated bedside today. He is resting comfortably at this time. He has mild soreness around the elbow but overall feels that his pain and swelling has improved greatly since the I&D last week. Dressing applied yesterday had very scant serous colored drainage on it only. Denies fever or chills. He feels he is overall improving.     Labs:  0   Lab Value Date/Time    HCT 47.5 08/15/2023 0511    HCT 46.0 08/14/2023 0519    HCT 48.0 08/13/2023 0431    HGB 15.2 08/15/2023 0511    HGB 15.1 08/14/2023 0519    HGB 15.3 08/13/2023 0431    WBC 6.84 08/15/2023 0511    WBC 6.59 08/14/2023 0519    WBC 6.74 08/13/2023 0431    CRP 23.2 (H) 08/01/2023 0938       Meds:    Current Facility-Administered Medications:   •  acetaminophen (TYLENOL) tablet 650 mg, 650 mg, Oral, Q6H PRN, Shahid Carlin PA-C  •  aluminum-magnesium hydroxide-simethicone (MAALOX) oral suspension 30 mL, 30 mL, Oral, Q6H PRN, Shahid Carlin PA-C  •  bisacodyl (DULCOLAX) rectal suppository 10 mg, 10 mg, Rectal, Daily PRN, Shahid Carlin PA-C, 10 mg at 08/11/23 1459  •  ceFAZolin (ANCEF) IVPB (premix in dextrose) 2,000 mg 50 mL, 2,000 mg, Intravenous, Q8H, Shahid Carlin PA-C, Last Rate: 100 mL/hr at 08/15/23 1213, 2,000 mg at 08/15/23 1213  •  docusate sodium (COLACE) capsule 100 mg, 100 mg, Oral, BID, Shahid Carlin PA-C, 100 mg at 08/15/23 3246  •  enoxaparin (LOVENOX) subcutaneous injection 40 mg, 40 mg, Subcutaneous, Daily, Shahid Carlin PA-C, 40 mg at 08/15/23 0127  •  melatonin tablet 3 mg, 3 mg, Oral, HS, Shahid Carlin PA-C, 3 mg at 08/14/23 2037  •  ondansetron [facility]) injection 4 mg, 4 mg, Intravenous, Q6H PRN, Shahid Carlin PA-C  •  oxyCODONE (ROXICODONE) IR tablet 5 mg, 5 mg, Oral, Q6H PRN, 5 mg at 08/15/23 0634 **OR** oxyCODONE (ROXICODONE) immediate release tablet 10 mg, 10 mg, Oral, Q6H PRN, Lynsey Maxwell PA-C, 10 mg at 08/15/23 1212    Physical exam:  Vitals:    08/15/23 0708   BP: 130/69   Pulse: 55   Resp: 18   Temp: 97.5 °F (36.4 °C)   SpO2: 97%     Right elbow:  · Dressings with mild serosanguinous drainage, no purulence. No drainage from incision with palpation of the bursa. · Incision is clean and dry, however his kerlix is unwrapped and moved around the arm. · +Swelling of the olecranon bursa  · Mild diffuse generalized swelling about the elbow. No significant erythema  · Near full elbow ROM without pain  · Sensation intact median, ulnar, and radial nerves. · 2+ radia pulse    Assessment: 71 y. o.male POD 5 s/p I&D right elbow olecranon bursa for septic olecranon bursitis    Plan:  · Pain Control  · WBAT RUE  · PT/OT  · Abx per ID  · Medical management per primary  · Dressing changes as needed, nursing advised to change dressing after this morning's visit. · WBC stable, afebrile, improving pain. · Stable for d/c from orthopedic standpoint. Follow up with Dr. Jc Dominguez as outpt.       Fabiola Castorena PA-C aching
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
## 2024-04-11 NOTE — BH INPATIENT PSYCHIATRY PROGRESS NOTE - NSBHATTESTATTENDCOLLABFT_PSY_A_CORE
Physical Therapy Visit    Visit Type: Daily Treatment Note  Visit: 3  Referring Provider: Carlos Melgoza MD  Medical Diagnosis (from order): M54.32 - Left sided sciatica   Patient alert and oriented X3.    SUBJECTIVE                                                                                                               Patient reports 2/10 pain in the knee but no pain in the back. Still having some pain in the back with standing and reaching overhead/ twisting. Can stand 10 minutes before symptoms start traveling down the left leg. Walking on uneven surfaces brings sciatic symptoms very quickly. Still can't get socks on by himself uses a tool to pull them up.  Feels he doesn't need AFO, is confident with walking on level surfaces.  Functional Change: Nothing new to report    Pain / Symptoms  - Pain rating (out of 10): Current: 2       OBJECTIVE                                                                                                                           Treatment     Therapeutic Exercise  Seated bariatric Nu-step workload 1 seat 26 for 6 minutes. Range, strength and activity tolerance while completing subjective questioning     Supine:   Short arc quad into straight leg raise bilaterally 2 x 15 Bilateral   Hamstring stretch - bilateral - x 30 seconds    Right dorsiflexion vs green theraband x 10 - emphasis on eccentric action    Standing   Paloff press with 7 pounds resistance x 10 bilaterally with Transverse abdominas bracing   Cross body chop downs vs 7 pounds x 10 bilaterally.       Neuromuscular Re-Education  Standing balance:   Feet together x 30 seconds  Modified tandem x 30 secnods bilaterally  Tandem bilaterally x 30 seconds     Airex mat  Feet apart x 30 seconds   Feet together x 30 seconds   Standing marches x 15 bilaterally.     Walking while balancing green weighted ball on air ex pad 1x around gym ~50 feet    Skilled input: verbal instruction/cues and posture correction    Writer 
verbally educated and received verbal consent for hand placement, positioning of patient, and techniques to be performed today from patient for hand placement and palpation for techniques as described above and how they are pertinent to the patient's plan of care.  Home Exercise Program  Access Code: [email or web address]  URL: [email or web address]  Date: 04/09/2024  Prepared by: Katelyn Vela    Exercises  - Supine Heel Slide with Strap  - 2 x daily - 10-15 reps - 5 seconds hold  - Quad Setting and Stretching  - 2 x daily - 10-15 reps - 5 seconds hold  - Seated Hamstring Stretch  - 2 x daily - 2-3 reps - 20-30 seconds hold  - Straight Leg Raise Sciatic Nerve Flossing  - 2 x daily - 7 x weekly - 3 sets - 10 reps  - Supine Bridge  - 1 x daily - 7 x weekly - 3 sets - 10 reps  - Supine Transversus Abdominis Bracing - Hands on Stomach  - 2 x daily - 7 x weekly - 1 sets - 3 reps - 10 hold      ASSESSMENT                                                                                                            Patient presents with no pain in the back with sitting, dynamic activities still causing symptoms. Standing core exercises performed without increase of symptoms. Patient is in good compliance with home exercises, still a good challenge for him. Standing balance exercises tolerated without increase in pain.  Instructed to ice/heat as needed for pain control.   Pain/symptoms after session (out of 10): 2  Education:   - Results of above outlined education: Needs reinforcement and Verbalizes understanding    PLAN                                                                                                                           Suggestions for next session as indicated: Progress per plan of care. Continue to progress standing core exercise to increase tolerance for standing. Hip/knee strength progression and static/dynamic balance activities to increase ability to walk through uneven surfaces.         I 
was in the immediate presence of the student and directed the student’s performance of the services. I am responsible for all treatment, assessment, documentation, and billing rendered for this patient.   Katelyn Vela, PTA        Therapy procedure time and total treatment time can be found documented on the Time Entry flowsheet    
Dr. Varner
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
## 2024-12-23 NOTE — BH INPATIENT PSYCHIATRY ASSESSMENT NOTE - HOMICIDALITY / AGGRESSION (CURRENT/PAST)
ANTICOAGULATION     Bere Machuca is overdue for an INR check.     [employer] message sent     Fidelia Warren RN  12/23/2024  Anticoagulation Clinic  [facility] for routing messages: [employer] [facility]  [facility] patient phone line: [phone]     None known in lifetime

## 2025-02-11 NOTE — BH INPATIENT PSYCHIATRY ASSESSMENT NOTE - NSBHCHARTREVIEWVS_PSY_A_CORE FT
Detail Level: Detailed Vital Signs Last 24 Hrs  T(C): 37.2 (06-25-23 @ 08:57), Max: 37.2 (06-25-23 @ 08:57)  T(F): 98.9 (06-25-23 @ 08:57), Max: 98.9 (06-25-23 @ 08:57)  HR: 101 (06-25-23 @ 08:57) (100 - 108)  BP: 127/75 (06-25-23 @ 08:57) (127/75 - 194/100)  BP(mean): --  RR: 16 (06-25-23 @ 08:57) (16 - 18)  SpO2: 99% (06-24-23 @ 21:14) (97% - 100%)     Vital Signs Last 24 Hrs  T(C): 35.7 (06-25-23 @ 15:32), Max: 37.2 (06-25-23 @ 08:57)  T(F): 96.2 (06-25-23 @ 15:32), Max: 98.9 (06-25-23 @ 08:57)  HR: 109 (06-25-23 @ 15:32) (100 - 109)  BP: 128/90 (06-25-23 @ 15:32) (127/75 - 194/100)  BP(mean): --  RR: 16 (06-25-23 @ 15:32) (16 - 18)  SpO2: 99% (06-24-23 @ 21:14) (97% - 99%)

## 2025-05-10 NOTE — DISCHARGE NOTE NURSING/CASE MANAGEMENT/SOCIAL WORK - NSPROEXTENSIONSOFSELF_GEN_A_NUR
none 1. NPO with tube feedings:   **consider the following regimen: Glucerna 1.5 tube feeding formula: continue PEG of this formula with goal of 60mL/hour x 18 hours (IF 24-hour regimen is desired, goal rate would be 45mL/hour x 24 hours), which provides ~1620 calories, 90g protein, 820mL free water; this meets ~27.5 calories/kg and 1.53g protein/kg ideal body weight (58.9kg)  **maintain aspiration precautions at all times**  **monitor for signs/symptoms of intolerances**  2. Monitor weights (weekly), GI function, skin integrity, labs/chemistry  3. Pain and bowel regimen per medical team  4. Align nutrition with goals of care at all times
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
